# Patient Record
Sex: MALE | Race: WHITE | Employment: OTHER | URBAN - METROPOLITAN AREA
[De-identification: names, ages, dates, MRNs, and addresses within clinical notes are randomized per-mention and may not be internally consistent; named-entity substitution may affect disease eponyms.]

---

## 2017-01-09 ENCOUNTER — OFFICE VISIT (OUTPATIENT)
Dept: ORTHOPEDIC SURGERY | Age: 77
End: 2017-01-09

## 2017-01-09 VITALS
HEART RATE: 57 BPM | HEIGHT: 68 IN | DIASTOLIC BLOOD PRESSURE: 69 MMHG | BODY MASS INDEX: 33.35 KG/M2 | WEIGHT: 220.02 LBS | SYSTOLIC BLOOD PRESSURE: 108 MMHG

## 2017-01-09 DIAGNOSIS — M48.061 LUMBAR STENOSIS: Primary | ICD-10-CM

## 2017-01-09 DIAGNOSIS — M47.816 SPONDYLOSIS OF LUMBAR REGION WITHOUT MYELOPATHY OR RADICULOPATHY: ICD-10-CM

## 2017-01-09 PROCEDURE — 99213 OFFICE O/P EST LOW 20 MIN: CPT | Performed by: PHYSICAL MEDICINE & REHABILITATION

## 2017-01-13 ENCOUNTER — TELEPHONE (OUTPATIENT)
Dept: ORTHOPEDIC SURGERY | Age: 77
End: 2017-01-13

## 2017-01-24 ENCOUNTER — HOSPITAL ENCOUNTER (OUTPATIENT)
Dept: PAIN MANAGEMENT | Age: 77
Discharge: HOME | End: 2017-01-24
Attending: PHYSICAL MEDICINE & REHABILITATION | Admitting: PHYSICAL MEDICINE & REHABILITATION

## 2017-01-24 VITALS
HEIGHT: 68 IN | RESPIRATION RATE: 18 BRPM | BODY MASS INDEX: 31.37 KG/M2 | HEART RATE: 57 BPM | TEMPERATURE: 97.3 F | OXYGEN SATURATION: 95 % | SYSTOLIC BLOOD PRESSURE: 142 MMHG | WEIGHT: 207 LBS | DIASTOLIC BLOOD PRESSURE: 91 MMHG

## 2017-01-24 LAB
GLUCOSE BLD-MCNC: 105 MG/DL (ref 70–99)
PERFORMED ON: ABNORMAL

## 2017-01-24 ASSESSMENT — PAIN - FUNCTIONAL ASSESSMENT
PAIN_FUNCTIONAL_ASSESSMENT: 0-10
PAIN_FUNCTIONAL_ASSESSMENT: 0-10

## 2017-01-24 ASSESSMENT — ACTIVITIES OF DAILY LIVING (ADL): EFFECT OF PAIN ON DAILY ACTIVITIES: STANDING OR WALKING

## 2017-01-24 ASSESSMENT — PAIN DESCRIPTION - DESCRIPTORS: DESCRIPTORS: ACHING

## 2017-02-14 ENCOUNTER — OFFICE VISIT (OUTPATIENT)
Dept: ORTHOPEDIC SURGERY | Age: 77
End: 2017-02-14

## 2017-02-14 VITALS
WEIGHT: 207.01 LBS | SYSTOLIC BLOOD PRESSURE: 136 MMHG | HEIGHT: 68 IN | HEART RATE: 54 BPM | DIASTOLIC BLOOD PRESSURE: 83 MMHG | BODY MASS INDEX: 31.37 KG/M2

## 2017-02-14 DIAGNOSIS — M48.061 LUMBAR STENOSIS: Primary | ICD-10-CM

## 2017-02-14 DIAGNOSIS — I71.40 AAA (ABDOMINAL AORTIC ANEURYSM) WITHOUT RUPTURE: ICD-10-CM

## 2017-02-14 PROCEDURE — 99213 OFFICE O/P EST LOW 20 MIN: CPT | Performed by: PHYSICAL MEDICINE & REHABILITATION

## 2017-05-01 DIAGNOSIS — R73.01 IMPAIRED FASTING BLOOD SUGAR: ICD-10-CM

## 2017-05-15 RX ORDER — MELOXICAM 15 MG/1
15 TABLET ORAL DAILY
Qty: 30 TABLET | Refills: 0 | Status: SHIPPED | OUTPATIENT
Start: 2017-05-15 | End: 2017-10-09 | Stop reason: SDUPTHER

## 2017-05-15 RX ORDER — ATORVASTATIN CALCIUM 80 MG/1
TABLET, FILM COATED ORAL
Qty: 30 TABLET | Refills: 0 | Status: SHIPPED | OUTPATIENT
Start: 2017-05-15 | End: 2017-06-20 | Stop reason: SDUPTHER

## 2017-05-29 DIAGNOSIS — R73.01 IMPAIRED FASTING BLOOD SUGAR: ICD-10-CM

## 2017-06-07 ENCOUNTER — OFFICE VISIT (OUTPATIENT)
Dept: FAMILY MEDICINE CLINIC | Age: 77
End: 2017-06-07

## 2017-06-07 VITALS
WEIGHT: 215 LBS | RESPIRATION RATE: 16 BRPM | BODY MASS INDEX: 32.58 KG/M2 | HEART RATE: 53 BPM | OXYGEN SATURATION: 93 % | DIASTOLIC BLOOD PRESSURE: 70 MMHG | HEIGHT: 68 IN | SYSTOLIC BLOOD PRESSURE: 122 MMHG

## 2017-06-07 DIAGNOSIS — F32.9 REACTIVE DEPRESSION: ICD-10-CM

## 2017-06-07 DIAGNOSIS — E11.9 TYPE 2 DIABETES MELLITUS WITHOUT COMPLICATION, WITH LONG-TERM CURRENT USE OF INSULIN (HCC): Primary | ICD-10-CM

## 2017-06-07 DIAGNOSIS — E78.00 HYPERCHOLESTEROLEMIA: ICD-10-CM

## 2017-06-07 DIAGNOSIS — M54.16 LUMBAR RADICULOPATHY: ICD-10-CM

## 2017-06-07 DIAGNOSIS — I25.10 CORONARY ARTERY DISEASE INVOLVING NATIVE CORONARY ARTERY OF NATIVE HEART WITHOUT ANGINA PECTORIS: ICD-10-CM

## 2017-06-07 DIAGNOSIS — I71.40 ABDOMINAL AORTIC ANEURYSM (AAA) WITHOUT RUPTURE: ICD-10-CM

## 2017-06-07 DIAGNOSIS — H91.93 BILATERAL HEARING LOSS: ICD-10-CM

## 2017-06-07 DIAGNOSIS — Z79.4 TYPE 2 DIABETES MELLITUS WITHOUT COMPLICATION, WITH LONG-TERM CURRENT USE OF INSULIN (HCC): Primary | ICD-10-CM

## 2017-06-07 LAB — HBA1C MFR BLD: 6.5 %

## 2017-06-07 PROCEDURE — 99214 OFFICE O/P EST MOD 30 MIN: CPT | Performed by: INTERNAL MEDICINE

## 2017-06-07 PROCEDURE — 83036 HEMOGLOBIN GLYCOSYLATED A1C: CPT | Performed by: INTERNAL MEDICINE

## 2017-06-07 RX ORDER — SERTRALINE HYDROCHLORIDE 100 MG/1
100 TABLET, FILM COATED ORAL DAILY
Qty: 90 TABLET | Refills: 0 | Status: SHIPPED | OUTPATIENT
Start: 2017-06-07 | End: 2017-09-01 | Stop reason: SDUPTHER

## 2017-06-07 ASSESSMENT — PATIENT HEALTH QUESTIONNAIRE - PHQ9
1. LITTLE INTEREST OR PLEASURE IN DOING THINGS: 0
2. FEELING DOWN, DEPRESSED OR HOPELESS: 0
SUM OF ALL RESPONSES TO PHQ9 QUESTIONS 1 & 2: 0
SUM OF ALL RESPONSES TO PHQ QUESTIONS 1-9: 0

## 2017-06-07 ASSESSMENT — ENCOUNTER SYMPTOMS
COLOR CHANGE: 0
COUGH: 0

## 2017-06-14 DIAGNOSIS — E11.9 TYPE 2 DIABETES MELLITUS WITHOUT COMPLICATION, WITH LONG-TERM CURRENT USE OF INSULIN (HCC): ICD-10-CM

## 2017-06-14 DIAGNOSIS — Z79.4 TYPE 2 DIABETES MELLITUS WITHOUT COMPLICATION, WITH LONG-TERM CURRENT USE OF INSULIN (HCC): ICD-10-CM

## 2017-06-14 DIAGNOSIS — E78.00 HYPERCHOLESTEROLEMIA: ICD-10-CM

## 2017-06-14 LAB
A/G RATIO: 2 (ref 1.1–2.2)
ALBUMIN SERPL-MCNC: 4.6 G/DL (ref 3.4–5)
ALP BLD-CCNC: 69 U/L (ref 40–129)
ALT SERPL-CCNC: 17 U/L (ref 10–40)
ANION GAP SERPL CALCULATED.3IONS-SCNC: 11 MMOL/L (ref 3–16)
AST SERPL-CCNC: 20 U/L (ref 15–37)
BILIRUB SERPL-MCNC: 1.1 MG/DL (ref 0–1)
BUN BLDV-MCNC: 14 MG/DL (ref 7–20)
CALCIUM SERPL-MCNC: 9.7 MG/DL (ref 8.3–10.6)
CHLORIDE BLD-SCNC: 101 MMOL/L (ref 99–110)
CHOLESTEROL, TOTAL: 117 MG/DL (ref 0–199)
CO2: 29 MMOL/L (ref 21–32)
CREAT SERPL-MCNC: 0.8 MG/DL (ref 0.8–1.3)
GFR AFRICAN AMERICAN: >60
GFR NON-AFRICAN AMERICAN: >60
GLOBULIN: 2.3 G/DL
GLUCOSE BLD-MCNC: 103 MG/DL (ref 70–99)
HDLC SERPL-MCNC: 36 MG/DL (ref 40–60)
LDL CHOLESTEROL CALCULATED: 59 MG/DL
POTASSIUM SERPL-SCNC: 5.6 MMOL/L (ref 3.5–5.1)
SODIUM BLD-SCNC: 141 MMOL/L (ref 136–145)
TOTAL PROTEIN: 6.9 G/DL (ref 6.4–8.2)
TRIGL SERPL-MCNC: 111 MG/DL (ref 0–150)
VLDLC SERPL CALC-MCNC: 22 MG/DL

## 2017-06-23 RX ORDER — ATORVASTATIN CALCIUM 80 MG/1
TABLET, FILM COATED ORAL
Qty: 30 TABLET | Refills: 3 | Status: SHIPPED | OUTPATIENT
Start: 2017-06-23 | End: 2017-08-28 | Stop reason: SDUPTHER

## 2017-08-28 ENCOUNTER — TELEPHONE (OUTPATIENT)
Dept: FAMILY MEDICINE CLINIC | Age: 77
End: 2017-08-28

## 2017-08-28 DIAGNOSIS — R73.01 IMPAIRED FASTING BLOOD SUGAR: ICD-10-CM

## 2017-08-29 RX ORDER — ATORVASTATIN CALCIUM 80 MG/1
TABLET, FILM COATED ORAL
Qty: 90 TABLET | Refills: 1 | Status: SHIPPED | OUTPATIENT
Start: 2017-08-29 | End: 2017-10-09 | Stop reason: SDUPTHER

## 2017-09-01 DIAGNOSIS — F32.9 REACTIVE DEPRESSION: ICD-10-CM

## 2017-09-01 RX ORDER — SERTRALINE HYDROCHLORIDE 100 MG/1
TABLET, FILM COATED ORAL
Qty: 90 TABLET | Refills: 0 | Status: SHIPPED | OUTPATIENT
Start: 2017-09-01 | End: 2017-10-09 | Stop reason: SDUPTHER

## 2017-09-05 DIAGNOSIS — F32.9 REACTIVE DEPRESSION: ICD-10-CM

## 2017-09-06 RX ORDER — SERTRALINE HYDROCHLORIDE 100 MG/1
TABLET, FILM COATED ORAL
Qty: 90 TABLET | Refills: 0 | Status: SHIPPED | OUTPATIENT
Start: 2017-09-06 | End: 2017-11-08 | Stop reason: SDUPTHER

## 2017-10-09 ENCOUNTER — OFFICE VISIT (OUTPATIENT)
Dept: FAMILY MEDICINE CLINIC | Age: 77
End: 2017-10-09

## 2017-10-09 VITALS
OXYGEN SATURATION: 93 % | TEMPERATURE: 98.4 F | HEART RATE: 59 BPM | WEIGHT: 221 LBS | SYSTOLIC BLOOD PRESSURE: 108 MMHG | BODY MASS INDEX: 33.49 KG/M2 | RESPIRATION RATE: 16 BRPM | DIASTOLIC BLOOD PRESSURE: 70 MMHG | HEIGHT: 68 IN

## 2017-10-09 DIAGNOSIS — F32.9 REACTIVE DEPRESSION: ICD-10-CM

## 2017-10-09 DIAGNOSIS — Z23 NEED FOR INFLUENZA VACCINATION: ICD-10-CM

## 2017-10-09 DIAGNOSIS — Z86.79 S/P ABDOMINAL AORTIC ANEURYSM REPAIR: ICD-10-CM

## 2017-10-09 DIAGNOSIS — E78.00 PURE HYPERCHOLESTEROLEMIA: ICD-10-CM

## 2017-10-09 DIAGNOSIS — Z23 NEED FOR VACCINATION WITH 13-POLYVALENT PNEUMOCOCCAL CONJUGATE VACCINE: ICD-10-CM

## 2017-10-09 DIAGNOSIS — H91.93 BILATERAL HEARING LOSS, UNSPECIFIED HEARING LOSS TYPE: ICD-10-CM

## 2017-10-09 DIAGNOSIS — Z98.890 S/P ABDOMINAL AORTIC ANEURYSM REPAIR: ICD-10-CM

## 2017-10-09 DIAGNOSIS — E11.9 TYPE 2 DIABETES MELLITUS WITHOUT COMPLICATION, WITHOUT LONG-TERM CURRENT USE OF INSULIN (HCC): Primary | ICD-10-CM

## 2017-10-09 DIAGNOSIS — I25.10 CORONARY ARTERY DISEASE INVOLVING NATIVE CORONARY ARTERY OF NATIVE HEART WITHOUT ANGINA PECTORIS: ICD-10-CM

## 2017-10-09 DIAGNOSIS — M25.50 MULTIPLE JOINT PAIN: ICD-10-CM

## 2017-10-09 PROCEDURE — 90670 PCV13 VACCINE IM: CPT | Performed by: INTERNAL MEDICINE

## 2017-10-09 PROCEDURE — G0008 ADMIN INFLUENZA VIRUS VAC: HCPCS | Performed by: INTERNAL MEDICINE

## 2017-10-09 PROCEDURE — 90662 IIV NO PRSV INCREASED AG IM: CPT | Performed by: INTERNAL MEDICINE

## 2017-10-09 PROCEDURE — G0009 ADMIN PNEUMOCOCCAL VACCINE: HCPCS | Performed by: INTERNAL MEDICINE

## 2017-10-09 PROCEDURE — 99214 OFFICE O/P EST MOD 30 MIN: CPT | Performed by: INTERNAL MEDICINE

## 2017-10-09 ASSESSMENT — ENCOUNTER SYMPTOMS
COLOR CHANGE: 0
COUGH: 0

## 2017-10-13 RX ORDER — ATORVASTATIN CALCIUM 80 MG/1
TABLET, FILM COATED ORAL
Qty: 90 TABLET | Refills: 1 | Status: SHIPPED | OUTPATIENT
Start: 2017-10-13 | End: 2018-05-03 | Stop reason: SDUPTHER

## 2017-10-13 RX ORDER — MELOXICAM 15 MG/1
15 TABLET ORAL DAILY
Qty: 30 TABLET | Refills: 0 | Status: SHIPPED | OUTPATIENT
Start: 2017-10-13 | End: 2017-11-08

## 2017-10-13 RX ORDER — SERTRALINE HYDROCHLORIDE 100 MG/1
100 TABLET, FILM COATED ORAL DAILY
Qty: 90 TABLET | Refills: 0 | Status: SHIPPED | OUTPATIENT
Start: 2017-10-13 | End: 2018-05-29 | Stop reason: SDUPTHER

## 2017-10-15 PROBLEM — Z98.890 S/P ABDOMINAL AORTIC ANEURYSM REPAIR: Status: ACTIVE | Noted: 2017-10-15

## 2017-10-15 PROBLEM — Z86.79 S/P ABDOMINAL AORTIC ANEURYSM REPAIR: Status: ACTIVE | Noted: 2017-10-15

## 2017-10-15 NOTE — PATIENT INSTRUCTIONS
you are having a problem with your medicine. You will get more details on the specific medicines your doctor prescribes. · Check your blood sugar as often as your doctor recommends. It is important to keep track of any symptoms you have, such as low blood sugar. Also tell your doctor if you have any changes in your activities, diet, or insulin use. · Talk to your doctor before you start taking aspirin every day. Aspirin can help certain people lower their risk of a heart attack or stroke. But taking aspirin isn't right for everyone, because it can cause serious bleeding. · Do not smoke. If you need help quitting, talk to your doctor about stop-smoking programs and medicines. These can increase your chances of quitting for good. · Keep your cholesterol and blood pressure at normal levels. You may need to take one or more medicines to reach your goals. Take them exactly as directed. Do not stop or change a medicine without talking to your doctor first.  When should you call for help? Call 911 anytime you think you may need emergency care. For example, call if:  · You passed out (lost consciousness), or you suddenly become very sleepy or confused. (You may have very low blood sugar.)  Call your doctor now or seek immediate medical care if:  · Your blood sugar is 300 mg/dL or is higher than the level your doctor has set for you. · You have symptoms of low blood sugar, such as:  ¨ Sweating. ¨ Feeling nervous, shaky, and weak. ¨ Extreme hunger and slight nausea. ¨ Dizziness and headache. ¨ Blurred vision. ¨ Confusion. Watch closely for changes in your health, and be sure to contact your doctor if:  · You often have problems controlling your blood sugar. · You have symptoms of long-term diabetes problems, such as:  ¨ New vision changes. ¨ New pain, numbness, or tingling in your hands or feet. ¨ Skin problems. Where can you learn more? Go to [email or web address] and sign in to your

## 2017-11-08 ENCOUNTER — OFFICE VISIT (OUTPATIENT)
Dept: FAMILY MEDICINE CLINIC | Age: 77
End: 2017-11-08

## 2017-11-08 VITALS
HEIGHT: 68 IN | DIASTOLIC BLOOD PRESSURE: 91 MMHG | OXYGEN SATURATION: 93 % | HEART RATE: 60 BPM | RESPIRATION RATE: 16 BRPM | WEIGHT: 221 LBS | SYSTOLIC BLOOD PRESSURE: 145 MMHG | BODY MASS INDEX: 33.49 KG/M2

## 2017-11-08 DIAGNOSIS — E11.9 CONTROLLED TYPE 2 DIABETES MELLITUS WITHOUT COMPLICATION, WITHOUT LONG-TERM CURRENT USE OF INSULIN (HCC): ICD-10-CM

## 2017-11-08 DIAGNOSIS — M25.50 MULTIPLE JOINT PAIN: Primary | ICD-10-CM

## 2017-11-08 DIAGNOSIS — F32.9 REACTIVE DEPRESSION: ICD-10-CM

## 2017-11-08 DIAGNOSIS — I25.10 CORONARY ARTERY DISEASE INVOLVING NATIVE CORONARY ARTERY OF NATIVE HEART WITHOUT ANGINA PECTORIS: ICD-10-CM

## 2017-11-08 DIAGNOSIS — Z23 NEED FOR SHINGLES VACCINE: ICD-10-CM

## 2017-11-08 DIAGNOSIS — Z86.79 S/P AAA REPAIR: ICD-10-CM

## 2017-11-08 DIAGNOSIS — Z98.890 S/P AAA REPAIR: ICD-10-CM

## 2017-11-08 PROCEDURE — 83036 HEMOGLOBIN GLYCOSYLATED A1C: CPT | Performed by: INTERNAL MEDICINE

## 2017-11-08 PROCEDURE — 99213 OFFICE O/P EST LOW 20 MIN: CPT | Performed by: INTERNAL MEDICINE

## 2017-11-08 RX ORDER — MELOXICAM 15 MG/1
15 TABLET ORAL DAILY
Qty: 30 TABLET | Refills: 0 | Status: CANCELLED | OUTPATIENT
Start: 2017-11-08

## 2017-11-08 RX ORDER — SERTRALINE HYDROCHLORIDE 100 MG/1
100 TABLET, FILM COATED ORAL DAILY
Qty: 90 TABLET | Refills: 0 | Status: CANCELLED | OUTPATIENT
Start: 2017-11-08

## 2017-11-08 RX ORDER — LOSARTAN POTASSIUM AND HYDROCHLOROTHIAZIDE 12.5; 1 MG/1; MG/1
1 TABLET ORAL DAILY
Qty: 90 TABLET | Refills: 1 | Status: CANCELLED | OUTPATIENT
Start: 2017-11-08

## 2017-11-08 ASSESSMENT — ENCOUNTER SYMPTOMS
COUGH: 0
COLOR CHANGE: 0

## 2017-11-08 NOTE — PROGRESS NOTES
Subjective:      Patient ID: Ria Peterson is a 65 y.o. male. HPI   Came in concerned with being very tired  Once in a while with no energy. Worked 4 days a week 5 hours a day. Does a lot of walking at work  Just had aortic aneurysm surgery 2 n months ago. States he is feeling good now Tired when he comes home from work , is ok in the morning. Advised he might need to work 2 days a week for now until he feels like he is able to work 4 days a week 5 hours  A day with a lot of walikng. Was On meloxicam for multiple joint pain, discontinued prior to aneurysm surgery  Taking zoloft for depression  Coronary artery disease is stable. Lab Results   Component Value Date    LABA1C 6.5 06/07/2017     Lab Results   Component Value Date    CHOL 117 06/14/2017    CHOL 131 10/03/2016     Lab Results   Component Value Date    TRIG 111 06/14/2017    TRIG 140 10/03/2016     Lab Results   Component Value Date    HDL 36 (L) 06/14/2017    HDL 32 (L) 10/03/2016     Lab Results   Component Value Date    LDLCALC 59 06/14/2017    LDLCALC 71 10/03/2016     Lab Results   Component Value Date    LABVLDL 22 06/14/2017    LABVLDL 28 10/03/2016     No results found for: [facility]    Chemistry        Component Value Date/Time     06/14/2017 0925    K 5.6 (H) 06/14/2017 0925     06/14/2017 0925    CO2 29 06/14/2017 0925    BUN 14 06/14/2017 0925    CREATININE 0.8 06/14/2017 0925        Component Value Date/Time    CALCIUM 9.7 06/14/2017 0925    ALKPHOS 69 06/14/2017 0925    AST 20 06/14/2017 0925    ALT 17 06/14/2017 0925    BILITOT 1.1 (H) 06/14/2017 0925        Allergies   Allergen Reactions    Penicillins Hives     Past Medical History:   Diagnosis Date    CAD (coronary artery disease)     Diabetes ([address]     Stroke [address] [address]        Review of Systems   Constitutional: Negative for activity change. HENT: Positive for hearing loss. Respiratory: Negative for cough. Cardiovascular: Negative for chest pain.    Skin: Negative for color change, rash and wound. Neurological: Negative for dizziness and headaches. Psychiatric/Behavioral: Negative. Objective:   Physical Exam   Constitutional: He appears well-developed and well-nourished. HENT:   Head: Normocephalic. Eyes: EOM are normal. Pupils are equal, round, and reactive to light. Cardiovascular: Normal rate and regular rhythm. Pulmonary/Chest: Effort normal and breath sounds normal.   Musculoskeletal: Normal range of motion. He exhibits no edema. Skin:        Nursing note and vitals reviewed. Assessment and plan:      1. Multiple joint pain  - exercise for now    2. Reactive depression  -on zoloft    3. Coronary artery disease involving native coronary artery of native heart without angina pectoris  -stable    4. S/P AAA repair  -stable    5. Need for shingles vaccine  -script given    6. Controlled type 2 diabetes mellitus without complication, without long-term current use of insulin ([facility] [address]  -on metformin    Annachetna Miesha received counseling on the following healthy behaviors: diet, exercise, medication compliance    Patient given educational materials on diabetes mellitus    I have instructed Brandee Whiteside to complete a self tracking handout on blood sugars and instructed them to bring it with them to his next appointment. Discussed use, benefit, and side effects of prescribed medications. Barriers to medication compliance addressed. All patient questions answered. Pt voiced understanding.

## 2017-11-08 NOTE — PATIENT INSTRUCTIONS
Patient Education        Recovering From Depression: Care Instructions  Your Care Instructions  Taking good care of yourself is important as you recover from depression. In time, your symptoms will fade as your treatment takes hold. Do not give up. Instead, focus your energy on getting better. Your mood will improve. It just takes some time. Focus on things that can help you feel better, such as being with friends and family, eating well, and getting enough rest. But take things slowly. Do not do too much too soon. You will begin to feel better gradually. Follow-up care is a key part of your treatment and safety. Be sure to make and go to all appointments, and call your doctor if you are having problems. It's also a good idea to know your test results and keep a list of the medicines you take. How can you care for yourself at home? Be realistic  · If you have a large task to do, break it up into smaller steps you can handle, and just do what you can. · You may want to put off important decisions until your depression has lifted. If you have plans that will have a major impact on your life, such as marriage, divorce, or a job change, try to wait a bit. Talk it over with friends and loved ones who can help you look at the overall picture first.  · Reaching out to people for help is important. Do not isolate yourself. Let your family and friends help you. Find someone you can trust and confide in, and talk to that person. · Be patient, and be kind to yourself. Remember that depression is not your fault and is not something you can overcome with willpower alone. Treatment is necessary for depression, just like for any other illness. Feeling better takes time, and your mood will improve little by little. Stay active  · Stay busy and get outside. Take a walk, or try some other light exercise. · Talk with your doctor about an exercise program. Exercise can help with mild depression. · Go to a movie or concert. all of the medicines you take, including those with or without a prescription. · Keep the numbers for these national suicide hotlines: 4-581-886-TALK (2-980.189.7811) and 6-518-CJXGLMP (2-716.938.9403). If you or someone you know talks about suicide or feeling hopeless, get help right away. When should you call for help? Call 911 anytime you think you may need emergency care. For example, call if:  · You feel like hurting yourself or someone else. · Someone you know has depression and is about to attempt or is attempting suicide. Call your doctor now or seek immediate medical care if:  · You hear voices. · Someone you know has depression and:  ¨ Starts to give away his or her possessions. ¨ Uses illegal drugs or drinks alcohol heavily. ¨ Talks or writes about death, including writing suicide notes or talking about guns, knives, or pills. ¨ Starts to spend a lot of time alone. ¨ Acts very aggressively or suddenly appears calm. Watch closely for changes in your health, and be sure to contact your doctor if:  · You do not get better as expected. Where can you learn more? Go to [email or web address] and sign in to your E[employer] account. Enter I036 in the [employer] Data.Media box to learn more about \"Recovering From Depression: Care Instructions. \"     If you do not have an account, please click on the \"Sign Up Now\" link. Current as of: July 26, 2016  Content Version: 11.3  © 3876-0623 [employer]. Care instructions adapted under license by [facility] ([facility]). If you have questions about a medical condition or this instruction, always ask your healthcare professional. [place] [address] any warranty or liability for your use of this information.

## 2017-11-12 PROBLEM — E11.9 CONTROLLED TYPE 2 DIABETES MELLITUS WITHOUT COMPLICATION, WITHOUT LONG-TERM CURRENT USE OF INSULIN (HCC): Status: ACTIVE | Noted: 2017-11-12

## 2017-11-27 RX ORDER — LOSARTAN POTASSIUM AND HYDROCHLOROTHIAZIDE 12.5; 1 MG/1; MG/1
1 TABLET ORAL DAILY
Qty: 30 TABLET | Refills: 3 | Status: SHIPPED | OUTPATIENT
Start: 2017-11-27 | End: 2018-04-09 | Stop reason: SDUPTHER

## 2018-04-11 RX ORDER — LOSARTAN POTASSIUM AND HYDROCHLOROTHIAZIDE 12.5; 1 MG/1; MG/1
1 TABLET ORAL DAILY
Qty: 30 TABLET | Refills: 3 | Status: SHIPPED | OUTPATIENT
Start: 2018-04-11 | End: 2018-05-08 | Stop reason: SDUPTHER

## 2018-05-03 ENCOUNTER — OFFICE VISIT (OUTPATIENT)
Dept: ORTHOPEDIC SURGERY | Age: 78
End: 2018-05-03

## 2018-05-03 VITALS
DIASTOLIC BLOOD PRESSURE: 74 MMHG | SYSTOLIC BLOOD PRESSURE: 125 MMHG | HEART RATE: 54 BPM | WEIGHT: 223 LBS | HEIGHT: 68 IN | BODY MASS INDEX: 33.8 KG/M2

## 2018-05-03 DIAGNOSIS — M25.511 RIGHT SHOULDER PAIN, UNSPECIFIED CHRONICITY: ICD-10-CM

## 2018-05-03 DIAGNOSIS — M75.41 IMPINGEMENT SYNDROME OF RIGHT SHOULDER: Primary | ICD-10-CM

## 2018-05-03 DIAGNOSIS — E78.00 PURE HYPERCHOLESTEROLEMIA: ICD-10-CM

## 2018-05-03 PROCEDURE — 99213 OFFICE O/P EST LOW 20 MIN: CPT | Performed by: ORTHOPAEDIC SURGERY

## 2018-05-03 PROCEDURE — 20611 DRAIN/INJ JOINT/BURSA W/US: CPT | Performed by: ORTHOPAEDIC SURGERY

## 2018-05-03 RX ORDER — ATORVASTATIN CALCIUM 80 MG/1
TABLET, FILM COATED ORAL
Qty: 90 TABLET | Refills: 0 | Status: SHIPPED | OUTPATIENT
Start: 2018-05-03 | End: 2018-11-23 | Stop reason: SDUPTHER

## 2018-05-09 DIAGNOSIS — E11.9 TYPE 2 DIABETES MELLITUS WITHOUT COMPLICATION, WITHOUT LONG-TERM CURRENT USE OF INSULIN (HCC): ICD-10-CM

## 2018-05-09 RX ORDER — LOSARTAN POTASSIUM AND HYDROCHLOROTHIAZIDE 12.5; 1 MG/1; MG/1
1 TABLET ORAL DAILY
Qty: 30 TABLET | Refills: 0 | Status: SHIPPED
Start: 2018-05-09 | End: 2019-07-15

## 2018-05-29 DIAGNOSIS — F32.9 REACTIVE DEPRESSION: ICD-10-CM

## 2018-05-29 RX ORDER — SERTRALINE HYDROCHLORIDE 100 MG/1
100 TABLET, FILM COATED ORAL DAILY
Qty: 30 TABLET | Refills: 0 | Status: SHIPPED | OUTPATIENT
Start: 2018-05-29 | End: 2018-06-25 | Stop reason: SDUPTHER

## 2018-06-07 DIAGNOSIS — E11.9 TYPE 2 DIABETES MELLITUS WITHOUT COMPLICATION, WITHOUT LONG-TERM CURRENT USE OF INSULIN (HCC): ICD-10-CM

## 2018-06-18 ENCOUNTER — OFFICE VISIT (OUTPATIENT)
Dept: FAMILY MEDICINE CLINIC | Age: 78
End: 2018-06-18

## 2018-06-18 VITALS
SYSTOLIC BLOOD PRESSURE: 118 MMHG | HEART RATE: 60 BPM | DIASTOLIC BLOOD PRESSURE: 70 MMHG | WEIGHT: 218 LBS | OXYGEN SATURATION: 90 % | BODY MASS INDEX: 33.04 KG/M2 | HEIGHT: 68 IN

## 2018-06-18 DIAGNOSIS — I25.10 CORONARY ARTERY DISEASE INVOLVING NATIVE CORONARY ARTERY OF NATIVE HEART WITHOUT ANGINA PECTORIS: ICD-10-CM

## 2018-06-18 DIAGNOSIS — E11.9 CONTROLLED TYPE 2 DIABETES MELLITUS WITHOUT COMPLICATION, WITHOUT LONG-TERM CURRENT USE OF INSULIN (HCC): ICD-10-CM

## 2018-06-18 DIAGNOSIS — K21.9 GASTROESOPHAGEAL REFLUX DISEASE WITHOUT ESOPHAGITIS: Primary | ICD-10-CM

## 2018-06-18 DIAGNOSIS — E78.00 PURE HYPERCHOLESTEROLEMIA: ICD-10-CM

## 2018-06-18 DIAGNOSIS — H91.93 BILATERAL HEARING LOSS, UNSPECIFIED HEARING LOSS TYPE: ICD-10-CM

## 2018-06-18 DIAGNOSIS — F32.9 REACTIVE DEPRESSION: ICD-10-CM

## 2018-06-18 LAB — HBA1C MFR BLD: 6.5 %

## 2018-06-18 PROCEDURE — 99214 OFFICE O/P EST MOD 30 MIN: CPT | Performed by: INTERNAL MEDICINE

## 2018-06-18 PROCEDURE — 83036 HEMOGLOBIN GLYCOSYLATED A1C: CPT | Performed by: INTERNAL MEDICINE

## 2018-06-18 ASSESSMENT — PATIENT HEALTH QUESTIONNAIRE - PHQ9
SUM OF ALL RESPONSES TO PHQ9 QUESTIONS 1 & 2: 0
SUM OF ALL RESPONSES TO PHQ QUESTIONS 1-9: 0
2. FEELING DOWN, DEPRESSED OR HOPELESS: 0
1. LITTLE INTEREST OR PLEASURE IN DOING THINGS: 0

## 2018-06-18 ASSESSMENT — ENCOUNTER SYMPTOMS
COUGH: 0
COLOR CHANGE: 0

## 2018-08-07 DIAGNOSIS — E11.9 TYPE 2 DIABETES MELLITUS WITHOUT COMPLICATION, WITHOUT LONG-TERM CURRENT USE OF INSULIN (HCC): ICD-10-CM

## 2018-08-10 DIAGNOSIS — E11.9 CONTROLLED TYPE 2 DIABETES MELLITUS WITHOUT COMPLICATION, WITHOUT LONG-TERM CURRENT USE OF INSULIN (HCC): ICD-10-CM

## 2018-08-10 DIAGNOSIS — E78.00 PURE HYPERCHOLESTEROLEMIA: ICD-10-CM

## 2018-08-10 LAB
A/G RATIO: 1.8 (ref 1.1–2.2)
ALBUMIN SERPL-MCNC: 4.2 G/DL (ref 3.4–5)
ALP BLD-CCNC: 81 U/L (ref 40–129)
ALT SERPL-CCNC: 16 U/L (ref 10–40)
ANION GAP SERPL CALCULATED.3IONS-SCNC: 11 MMOL/L (ref 3–16)
AST SERPL-CCNC: 19 U/L (ref 15–37)
BILIRUB SERPL-MCNC: 1 MG/DL (ref 0–1)
BUN BLDV-MCNC: 11 MG/DL (ref 7–20)
CALCIUM SERPL-MCNC: 9.2 MG/DL (ref 8.3–10.6)
CHLORIDE BLD-SCNC: 105 MMOL/L (ref 99–110)
CHOLESTEROL, TOTAL: 132 MG/DL (ref 0–199)
CO2: 28 MMOL/L (ref 21–32)
CREAT SERPL-MCNC: 0.9 MG/DL (ref 0.8–1.3)
GFR AFRICAN AMERICAN: >60
GFR NON-AFRICAN AMERICAN: >60
GLOBULIN: 2.3 G/DL
GLUCOSE BLD-MCNC: 114 MG/DL (ref 70–99)
HDLC SERPL-MCNC: 32 MG/DL (ref 40–60)
LDL CHOLESTEROL CALCULATED: 69 MG/DL
POTASSIUM SERPL-SCNC: 4.5 MMOL/L (ref 3.5–5.1)
SODIUM BLD-SCNC: 144 MMOL/L (ref 136–145)
TOTAL PROTEIN: 6.5 G/DL (ref 6.4–8.2)
TRIGL SERPL-MCNC: 157 MG/DL (ref 0–150)
VLDLC SERPL CALC-MCNC: 31 MG/DL

## 2018-08-11 LAB
ESTIMATED AVERAGE GLUCOSE: 145.6 MG/DL
HBA1C MFR BLD: 6.7 %

## 2018-08-23 RX ORDER — CARVEDILOL 6.25 MG/1
6.25 TABLET ORAL 2 TIMES DAILY
Qty: 180 TABLET | Refills: 0 | Status: SHIPPED | OUTPATIENT
Start: 2018-08-23 | End: 2018-11-30 | Stop reason: SDUPTHER

## 2018-11-19 ENCOUNTER — OFFICE VISIT (OUTPATIENT)
Dept: FAMILY MEDICINE CLINIC | Age: 78
End: 2018-11-19
Payer: MEDICARE

## 2018-11-19 VITALS
HEART RATE: 65 BPM | HEIGHT: 68 IN | SYSTOLIC BLOOD PRESSURE: 122 MMHG | OXYGEN SATURATION: 90 % | WEIGHT: 213 LBS | DIASTOLIC BLOOD PRESSURE: 64 MMHG | BODY MASS INDEX: 32.28 KG/M2

## 2018-11-19 DIAGNOSIS — F32.9 REACTIVE DEPRESSION: ICD-10-CM

## 2018-11-19 DIAGNOSIS — I25.10 CORONARY ARTERY DISEASE INVOLVING NATIVE CORONARY ARTERY OF NATIVE HEART WITHOUT ANGINA PECTORIS: ICD-10-CM

## 2018-11-19 DIAGNOSIS — I10 BENIGN ESSENTIAL HTN: ICD-10-CM

## 2018-11-19 DIAGNOSIS — Z23 NEED FOR PROPHYLACTIC VACCINATION AND INOCULATION AGAINST VARICELLA: ICD-10-CM

## 2018-11-19 DIAGNOSIS — E78.00 PURE HYPERCHOLESTEROLEMIA: ICD-10-CM

## 2018-11-19 DIAGNOSIS — Z23 FLU VACCINE NEED: ICD-10-CM

## 2018-11-19 DIAGNOSIS — Z98.890 S/P AAA REPAIR: ICD-10-CM

## 2018-11-19 DIAGNOSIS — Z00.00 ROUTINE GENERAL MEDICAL EXAMINATION AT A HEALTH CARE FACILITY: Primary | ICD-10-CM

## 2018-11-19 DIAGNOSIS — Z86.79 S/P AAA REPAIR: ICD-10-CM

## 2018-11-19 DIAGNOSIS — H90.0 CONDUCTIVE HEARING LOSS, BILATERAL: ICD-10-CM

## 2018-11-19 DIAGNOSIS — K21.9 GASTROESOPHAGEAL REFLUX DISEASE WITHOUT ESOPHAGITIS: ICD-10-CM

## 2018-11-19 DIAGNOSIS — E11.9 CONTROLLED TYPE 2 DIABETES MELLITUS WITHOUT COMPLICATION, WITHOUT LONG-TERM CURRENT USE OF INSULIN (HCC): ICD-10-CM

## 2018-11-19 DIAGNOSIS — E11.9 TYPE 2 DIABETES MELLITUS WITHOUT COMPLICATION, WITHOUT LONG-TERM CURRENT USE OF INSULIN (HCC): ICD-10-CM

## 2018-11-19 LAB — HBA1C MFR BLD: 6.3 %

## 2018-11-19 PROCEDURE — G0439 PPPS, SUBSEQ VISIT: HCPCS | Performed by: INTERNAL MEDICINE

## 2018-11-19 PROCEDURE — 90662 IIV NO PRSV INCREASED AG IM: CPT | Performed by: INTERNAL MEDICINE

## 2018-11-19 PROCEDURE — 83036 HEMOGLOBIN GLYCOSYLATED A1C: CPT | Performed by: INTERNAL MEDICINE

## 2018-11-19 PROCEDURE — G0008 ADMIN INFLUENZA VIRUS VAC: HCPCS | Performed by: INTERNAL MEDICINE

## 2018-11-19 ASSESSMENT — ANXIETY QUESTIONNAIRES: GAD7 TOTAL SCORE: 0

## 2018-11-19 ASSESSMENT — PATIENT HEALTH QUESTIONNAIRE - PHQ9
SUM OF ALL RESPONSES TO PHQ QUESTIONS 1-9: 0
SUM OF ALL RESPONSES TO PHQ QUESTIONS 1-9: 0

## 2018-11-19 ASSESSMENT — LIFESTYLE VARIABLES: HOW OFTEN DO YOU HAVE A DRINK CONTAINING ALCOHOL: 0

## 2018-11-19 NOTE — PROGRESS NOTES
Vaccine Information Sheet, \"Influenza - Inactivated\"  given to Soraida Lira, or parent/legal guardian of  Soraida Lira and verbalized understanding. Patient responses:    Have you ever had a reaction to a flu vaccine? No  Are you able to eat eggs without adverse effects? Yes  Do you have any current illness? No  Have you ever had Guillian [place] Syndrome? No    Flu vaccine given per order. Please see immunization tab.

## 2018-11-19 NOTE — PATIENT INSTRUCTIONS
your own, your heart stops, or you're unable to swallow. · What things would you still want to be able to do after you receive life-support methods? Would you want to be able to walk? To speak? To eat on your own? To live without the help of machines? · If you have a choice, where do you want to be cared for? In your home? At a hospital or nursing home? · Do you want certain Gnosticism practices performed if you become very ill? · If you have a choice at the end of your life, where would you prefer to die? At home? In a hospital or nursing home? Somewhere else? · Would you prefer to be buried or cremated? · Do you want your organs to be donated after you die? What should you do with your living will? · Make sure that your family members and your health care agent have copies of your living will. · Give your doctor a copy of your living will to keep in your medical record. If you have more than one doctor, make sure that each one has a copy. · You may want to put a copy of your living will where it can be easily found. Where can you learn more? Go to [email or web address] and sign in to your [place] account. Enter M267 in the [employer] box to learn more about \"Learning About Living Jayden Wagner. \"     If you do not have an account, please click on the \"Sign Up Now\" link. Current as of: April 19, 2018  Content Version: 11.8  © 8536-1666 [employer]. Care instructions adapted under license by [facility] ([facility]). If you have questions about a medical condition or this instruction, always ask your healthcare professional. [place] [address] any warranty or liability for your use of this information. Eating Healthy Foods: Care Instructions  Your Care Instructions    Eating healthy foods can help lower your risk for disease. Healthy food gives you energy and keeps your heart strong, your brain active, your muscles working, and your bones strong.   A

## 2018-11-21 DIAGNOSIS — F32.9 REACTIVE DEPRESSION: ICD-10-CM

## 2018-11-21 DIAGNOSIS — E11.9 TYPE 2 DIABETES MELLITUS WITHOUT COMPLICATION, WITHOUT LONG-TERM CURRENT USE OF INSULIN (HCC): ICD-10-CM

## 2018-11-21 RX ORDER — SERTRALINE HYDROCHLORIDE 100 MG/1
TABLET, FILM COATED ORAL
Qty: 30 TABLET | Refills: 3 | Status: SHIPPED | OUTPATIENT
Start: 2018-11-21 | End: 2019-04-25 | Stop reason: SDUPTHER

## 2018-11-23 PROBLEM — F32.9 REACTIVE DEPRESSION: Status: ACTIVE | Noted: 2018-11-23

## 2018-11-23 PROBLEM — K21.9 GASTROESOPHAGEAL REFLUX DISEASE WITHOUT ESOPHAGITIS: Status: ACTIVE | Noted: 2018-11-23

## 2018-11-23 PROBLEM — E78.00 PURE HYPERCHOLESTEROLEMIA: Status: ACTIVE | Noted: 2018-11-23

## 2018-11-30 RX ORDER — CARVEDILOL 6.25 MG/1
TABLET ORAL
Qty: 180 TABLET | Refills: 0 | Status: SHIPPED | OUTPATIENT
Start: 2018-11-30 | End: 2019-03-03 | Stop reason: SDUPTHER

## 2019-03-05 RX ORDER — CARVEDILOL 6.25 MG/1
TABLET ORAL
Qty: 180 TABLET | Refills: 0 | Status: SHIPPED | OUTPATIENT
Start: 2019-03-05 | End: 2019-06-01 | Stop reason: SDUPTHER

## 2019-06-04 RX ORDER — CARVEDILOL 6.25 MG/1
TABLET ORAL
Qty: 180 TABLET | Refills: 0 | Status: SHIPPED | OUTPATIENT
Start: 2019-06-04 | End: 2020-01-27 | Stop reason: SDUPTHER

## 2019-07-15 RX ORDER — LOSARTAN POTASSIUM AND HYDROCHLOROTHIAZIDE 12.5; 1 MG/1; MG/1
1 TABLET ORAL DAILY
Qty: 90 TABLET | Refills: 0 | Status: SHIPPED | OUTPATIENT
Start: 2019-07-15 | End: 2019-10-13 | Stop reason: SDUPTHER

## 2019-07-24 DIAGNOSIS — E78.00 PURE HYPERCHOLESTEROLEMIA: ICD-10-CM

## 2019-07-26 RX ORDER — ATORVASTATIN CALCIUM 80 MG/1
80 TABLET, FILM COATED ORAL DAILY
Qty: 90 TABLET | Refills: 1 | Status: SHIPPED | OUTPATIENT
Start: 2019-07-26 | End: 2021-11-07 | Stop reason: SDUPTHER

## 2019-07-29 DIAGNOSIS — F32.9 REACTIVE DEPRESSION: ICD-10-CM

## 2019-08-01 RX ORDER — SERTRALINE HYDROCHLORIDE 100 MG/1
100 TABLET, FILM COATED ORAL DAILY
Qty: 30 TABLET | Refills: 0 | Status: SHIPPED | OUTPATIENT
Start: 2019-08-01 | End: 2019-08-27 | Stop reason: SDUPTHER

## 2019-08-27 DIAGNOSIS — F32.9 REACTIVE DEPRESSION: ICD-10-CM

## 2019-08-27 RX ORDER — SERTRALINE HYDROCHLORIDE 100 MG/1
TABLET, FILM COATED ORAL
Qty: 30 TABLET | Refills: 0 | Status: SHIPPED | OUTPATIENT
Start: 2019-08-27 | End: 2019-09-26 | Stop reason: SDUPTHER

## 2019-09-10 ENCOUNTER — OFFICE VISIT (OUTPATIENT)
Dept: FAMILY MEDICINE CLINIC | Age: 79
End: 2019-09-10
Payer: MEDICARE

## 2019-09-10 VITALS
DIASTOLIC BLOOD PRESSURE: 73 MMHG | SYSTOLIC BLOOD PRESSURE: 111 MMHG | HEART RATE: 55 BPM | TEMPERATURE: 97.6 F | RESPIRATION RATE: 16 BRPM | WEIGHT: 218 LBS | BODY MASS INDEX: 33.04 KG/M2 | HEIGHT: 68 IN | OXYGEN SATURATION: 93 %

## 2019-09-10 DIAGNOSIS — G89.29 CHRONIC PAIN OF LEFT KNEE: ICD-10-CM

## 2019-09-10 DIAGNOSIS — E11.9 TYPE 2 DIABETES MELLITUS WITHOUT COMPLICATION, WITHOUT LONG-TERM CURRENT USE OF INSULIN (HCC): Primary | ICD-10-CM

## 2019-09-10 DIAGNOSIS — Z91.81 AT HIGH RISK FOR FALLS: ICD-10-CM

## 2019-09-10 DIAGNOSIS — Z23 NEED FOR TDAP VACCINATION: ICD-10-CM

## 2019-09-10 DIAGNOSIS — Z12.5 PROSTATE CANCER SCREENING: ICD-10-CM

## 2019-09-10 DIAGNOSIS — M25.562 CHRONIC PAIN OF LEFT KNEE: ICD-10-CM

## 2019-09-10 DIAGNOSIS — I10 BENIGN ESSENTIAL HTN: ICD-10-CM

## 2019-09-10 LAB — HBA1C MFR BLD: 5.9 %

## 2019-09-10 PROCEDURE — 83036 HEMOGLOBIN GLYCOSYLATED A1C: CPT | Performed by: FAMILY MEDICINE

## 2019-09-10 PROCEDURE — 90471 IMMUNIZATION ADMIN: CPT | Performed by: FAMILY MEDICINE

## 2019-09-10 PROCEDURE — 90715 TDAP VACCINE 7 YRS/> IM: CPT | Performed by: FAMILY MEDICINE

## 2019-09-10 PROCEDURE — 99214 OFFICE O/P EST MOD 30 MIN: CPT | Performed by: FAMILY MEDICINE

## 2019-09-10 RX ORDER — LIDOCAINE AND PRILOCAINE 25; 25 MG/G; MG/G
CREAM TOPICAL
Qty: 1 TUBE | Refills: 1 | Status: SHIPPED | OUTPATIENT
Start: 2019-09-10 | End: 2021-01-19

## 2019-09-10 ASSESSMENT — PATIENT HEALTH QUESTIONNAIRE - PHQ9
1. LITTLE INTEREST OR PLEASURE IN DOING THINGS: 0
SUM OF ALL RESPONSES TO PHQ9 QUESTIONS 1 & 2: 0
SUM OF ALL RESPONSES TO PHQ QUESTIONS 1-9: 0
SUM OF ALL RESPONSES TO PHQ QUESTIONS 1-9: 0
2. FEELING DOWN, DEPRESSED OR HOPELESS: 0

## 2019-09-10 NOTE — PROGRESS NOTES
Relationship status: Not on file    Intimate partner violence:     Fear of current or ex partner: Not on file     Emotionally abused: Not on file     Physically abused: Not on file     Forced sexual activity: Not on file   Other Topics Concern    Not on file   Social History Narrative    Not on file       Family History   Problem Relation Age of Onset    Cancer Father     Heart Disease Father     Diabetes Father        Current Outpatient Medications   Medication Sig Dispense Refill    lidocaine-prilocaine (EMLA) 2.5-2.5 % cream Apply topically as needed. 1 Tube 1    sertraline (ZOLOFT) 100 MG tablet TAKE ONE TABLET BY MOUTH DAILY 30 tablet 0    atorvastatin (LIPITOR) 80 MG tablet Take 1 tablet by mouth daily 90 tablet 1    losartan-hydrochlorothiazide (HYZAAR) 100-12.5 MG per tablet Take 1 tablet by mouth daily 90 tablet 0    carvedilol (COREG) 6.25 MG tablet TAKE ONE TABLET BY MOUTH TWICE A  tablet 0    metFORMIN (GLUCOPHAGE) 500 MG tablet TAKE ONE TABLET BY MOUTH TWICE A DAY WITH MEALS 60 tablet 3    aspirin 81 MG tablet Take by mouth      omeprazole (PRILOSEC) 20 MG capsule Take 20 mg by mouth daily       No current facility-administered medications for this visit. Immunization History   Administered Date(s) Administered    Influenza Vaccine, unspecified formulation 09/07/2016    Influenza, High Dose (Fluzone 65 yrs and older) 09/07/2016, 10/09/2017, 11/19/2018    Pneumococcal Conjugate 13-valent ([id number]) 10/09/2017    Pneumococcal Polysaccharide ([id number]) 09/29/2016    Tdap (Boostrix, Adacel) 09/10/2019       Allergies   Allergen Reactions    Penicillins Hives       Office Visit on 11/19/2018   Component Date Value Ref Range Status    Hemoglobin A1C 11/19/2018 6.3  % Final       Review of Systems   Constitutional: Negative for fatigue. Cardiovascular: Negative for chest pain. Endocrine: Negative for polydipsia, polyphagia and polyuria.    Musculoskeletal: Positive for (LIPITOR) 80 MG tablet Take 1 tablet by mouth daily Yes Augustus Nazario MD   losartan-hydrochlorothiazide (HYZAAR) 100-12.5 MG per tablet Take 1 tablet by mouth daily Yes CARLENE Lagunas - CNP   carvedilol (COREG) 6.25 MG tablet TAKE ONE TABLET BY MOUTH TWICE A DAY Yes Augustus Nazario MD   metFORMIN (GLUCOPHAGE) 500 MG tablet TAKE ONE TABLET BY MOUTH TWICE A DAY WITH MEALS Yes Augustus Nazario MD   aspirin 81 MG tablet Take by mouth Yes Historical Provider, MD   omeprazole (PRILOSEC) 20 MG capsule Take 20 mg by mouth daily Yes Historical Provider, MD             On the basis of positive falls risk screening, assessment and plan is as follows: home safety tips provided.

## 2019-09-30 ENCOUNTER — OFFICE VISIT (OUTPATIENT)
Dept: ORTHOPEDIC SURGERY | Age: 79
End: 2019-09-30
Payer: MEDICARE

## 2019-09-30 VITALS — BODY MASS INDEX: 33.15 KG/M2 | WEIGHT: 218 LBS

## 2019-09-30 DIAGNOSIS — S80.02XA CONTUSION OF LEFT KNEE, INITIAL ENCOUNTER: ICD-10-CM

## 2019-09-30 DIAGNOSIS — M25.562 LEFT KNEE PAIN, UNSPECIFIED CHRONICITY: Primary | ICD-10-CM

## 2019-09-30 PROCEDURE — 99213 OFFICE O/P EST LOW 20 MIN: CPT | Performed by: ORTHOPAEDIC SURGERY

## 2019-09-30 RX ORDER — BETAMETHASONE SODIUM PHOSPHATE AND BETAMETHASONE ACETATE 3; 3 MG/ML; MG/ML
12 INJECTION, SUSPENSION INTRA-ARTICULAR; INTRALESIONAL; INTRAMUSCULAR; SOFT TISSUE ONCE
Status: COMPLETED | OUTPATIENT
Start: 2019-09-30 | End: 2019-09-30

## 2019-09-30 RX ADMIN — BETAMETHASONE SODIUM PHOSPHATE AND BETAMETHASONE ACETATE 12 MG: 3; 3 INJECTION, SUSPENSION INTRA-ARTICULAR; INTRALESIONAL; INTRAMUSCULAR; SOFT TISSUE at 15:17

## 2019-09-30 NOTE — PROGRESS NOTES
Administrations This Visit     betamethasone acetate-betamethasone sodium phosphate (CELESTONE) injection 12 mg     Admin Date  09/30/2019  15:17 Action  Given Dose  12 mg Route  Intra-articular Site  Knee Left Administered By  Alexander Zhao MD    Ordering Provider:  Alexander Zhao MD    NDC:  4031-2772-42    Lot#:  [id number]    :  [employer]    Patient Supplied?:  No             3ML . 5% BUPIVACAINE [facility]#[id number] PVP#[id number] EXP 6/21
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
## 2019-10-14 RX ORDER — LOSARTAN POTASSIUM AND HYDROCHLOROTHIAZIDE 12.5; 1 MG/1; MG/1
TABLET ORAL
Qty: 90 TABLET | Refills: 0 | Status: SHIPPED | OUTPATIENT
Start: 2019-10-14 | End: 2020-01-13

## 2020-01-13 RX ORDER — LOSARTAN POTASSIUM AND HYDROCHLOROTHIAZIDE 12.5; 1 MG/1; MG/1
TABLET ORAL
Qty: 90 TABLET | Refills: 0 | Status: SHIPPED | OUTPATIENT
Start: 2020-01-13 | End: 2020-07-27

## 2020-01-27 NOTE — TELEPHONE ENCOUNTER
Last OV 11/19/18  Future Appointments   Date Time Provider Shon Berrios   2/13/2020 11:00 AM DO TEVIN Hernandez  MMA

## 2020-01-28 RX ORDER — CARVEDILOL 6.25 MG/1
6.25 TABLET ORAL 2 TIMES DAILY WITH MEALS
Qty: 180 TABLET | Refills: 0 | Status: SHIPPED | OUTPATIENT
Start: 2020-01-28 | End: 2020-04-24

## 2020-02-06 DIAGNOSIS — E11.9 TYPE 2 DIABETES MELLITUS WITHOUT COMPLICATION, WITHOUT LONG-TERM CURRENT USE OF INSULIN (HCC): ICD-10-CM

## 2020-02-06 DIAGNOSIS — I10 BENIGN ESSENTIAL HTN: ICD-10-CM

## 2020-02-06 DIAGNOSIS — Z12.5 PROSTATE CANCER SCREENING: ICD-10-CM

## 2020-02-06 LAB
A/G RATIO: 1.6 (ref 1.1–2.2)
ALBUMIN SERPL-MCNC: 4.2 G/DL (ref 3.4–5)
ALP BLD-CCNC: 69 U/L (ref 40–129)
ALT SERPL-CCNC: 13 U/L (ref 10–40)
ANION GAP SERPL CALCULATED.3IONS-SCNC: 11 MMOL/L (ref 3–16)
AST SERPL-CCNC: 15 U/L (ref 15–37)
BILIRUB SERPL-MCNC: 0.9 MG/DL (ref 0–1)
BUN BLDV-MCNC: 16 MG/DL (ref 7–20)
CALCIUM SERPL-MCNC: 9.8 MG/DL (ref 8.3–10.6)
CHLORIDE BLD-SCNC: 98 MMOL/L (ref 99–110)
CHOLESTEROL, TOTAL: 170 MG/DL (ref 0–199)
CO2: 27 MMOL/L (ref 21–32)
CREAT SERPL-MCNC: 0.9 MG/DL (ref 0.8–1.3)
GFR AFRICAN AMERICAN: >60
GFR NON-AFRICAN AMERICAN: >60
GLOBULIN: 2.6 G/DL
GLUCOSE BLD-MCNC: 120 MG/DL (ref 70–99)
HDLC SERPL-MCNC: 31 MG/DL (ref 40–60)
LDL CHOLESTEROL CALCULATED: 104 MG/DL
POTASSIUM SERPL-SCNC: 4.5 MMOL/L (ref 3.5–5.1)
PROSTATE SPECIFIC ANTIGEN: 0.09 NG/ML (ref 0–4)
SODIUM BLD-SCNC: 136 MMOL/L (ref 136–145)
TOTAL PROTEIN: 6.8 G/DL (ref 6.4–8.2)
TRIGL SERPL-MCNC: 174 MG/DL (ref 0–150)
TSH SERPL DL<=0.05 MIU/L-ACNC: 6.03 UIU/ML (ref 0.27–4.2)
VLDLC SERPL CALC-MCNC: 35 MG/DL

## 2020-02-07 LAB
BASOPHILS ABSOLUTE: 0 K/UL (ref 0–0.2)
BASOPHILS RELATIVE PERCENT: 0.7 %
EOSINOPHILS ABSOLUTE: 0.3 K/UL (ref 0–0.6)
EOSINOPHILS RELATIVE PERCENT: 4.8 %
HCT VFR BLD CALC: 48.9 % (ref 40.5–52.5)
HEMOGLOBIN: 15.8 G/DL (ref 13.5–17.5)
LYMPHOCYTES ABSOLUTE: 1.7 K/UL (ref 1–5.1)
LYMPHOCYTES RELATIVE PERCENT: 28.4 %
MCH RBC QN AUTO: 28.3 PG (ref 26–34)
MCHC RBC AUTO-ENTMCNC: 32.3 G/DL (ref 31–36)
MCV RBC AUTO: 87.4 FL (ref 80–100)
MONOCYTES ABSOLUTE: 0.4 K/UL (ref 0–1.3)
MONOCYTES RELATIVE PERCENT: 7.4 %
NEUTROPHILS ABSOLUTE: 3.5 K/UL (ref 1.7–7.7)
NEUTROPHILS RELATIVE PERCENT: 58.7 %
PDW BLD-RTO: 15.6 % (ref 12.4–15.4)
PLATELET # BLD: 164 K/UL (ref 135–450)
PLATELET SLIDE REVIEW: ADEQUATE
PMV BLD AUTO: 11.4 FL (ref 5–10.5)
RBC # BLD: 5.6 M/UL (ref 4.2–5.9)
WBC # BLD: 6 K/UL (ref 4–11)

## 2020-02-13 ENCOUNTER — OFFICE VISIT (OUTPATIENT)
Dept: FAMILY MEDICINE CLINIC | Age: 80
End: 2020-02-13
Payer: MEDICARE

## 2020-02-13 VITALS
SYSTOLIC BLOOD PRESSURE: 145 MMHG | WEIGHT: 219 LBS | RESPIRATION RATE: 16 BRPM | DIASTOLIC BLOOD PRESSURE: 83 MMHG | HEART RATE: 58 BPM | TEMPERATURE: 96.8 F | OXYGEN SATURATION: 94 % | BODY MASS INDEX: 33.19 KG/M2 | HEIGHT: 68 IN

## 2020-02-13 PROCEDURE — G0439 PPPS, SUBSEQ VISIT: HCPCS | Performed by: FAMILY MEDICINE

## 2020-02-13 RX ORDER — SERTRALINE HYDROCHLORIDE 25 MG/1
25 TABLET, FILM COATED ORAL DAILY
Qty: 30 TABLET | Refills: 3 | Status: SHIPPED
Start: 2020-02-13 | End: 2020-05-13 | Stop reason: RX

## 2020-02-13 ASSESSMENT — PATIENT HEALTH QUESTIONNAIRE - PHQ9
SUM OF ALL RESPONSES TO PHQ QUESTIONS 1-9: 2
SUM OF ALL RESPONSES TO PHQ QUESTIONS 1-9: 2

## 2020-02-13 ASSESSMENT — LIFESTYLE VARIABLES: HOW OFTEN DO YOU HAVE A DRINK CONTAINING ALCOHOL: 0

## 2020-02-13 NOTE — PATIENT INSTRUCTIONS
Personalized Preventive Plan for Barbie Green - 2/13/2020  Medicare offers a range of preventive health benefits. Some of the tests and screenings are paid in full while other may be subject to a deductible, co-insurance, and/or copay. Some of these benefits include a comprehensive review of your medical history including lifestyle, illnesses that may run in your family, and various assessments and screenings as appropriate. After reviewing your medical record and screening and assessments performed today your provider may have ordered immunizations, labs, imaging, and/or referrals for you. A list of these orders (if applicable) as well as your Preventive Care list are included within your After Visit Summary for your review. Other Preventive Recommendations:    · A preventive eye exam performed by an eye specialist is recommended every 1-2 years to screen for glaucoma; cataracts, macular degeneration, and other eye disorders. · A preventive dental visit is recommended every 6 months. · Try to get at least 150 minutes of exercise per week or 10,000 steps per day on a pedometer . · Order or download the FREE \"Exercise & Physical Activity: Your Everyday Guide\" from [employer]. Call 9-545.683.1788 or search [employer] online. · You need 6221-9277 mg of calcium and 0272-8444 IU of vitamin D per day. It is possible to meet your calcium requirement with diet alone, but a vitamin D supplement is usually necessary to meet this goal.  · When exposed to the sun, use a sunscreen that protects against both UVA and UVB radiation with an SPF of 30 or greater. Reapply every 2 to 3 hours or after sweating, drying off with a towel, or swimming. · Always wear a seat belt when traveling in a car. Always wear a helmet when riding a bicycle or motorcycle.

## 2020-02-13 NOTE — PROGRESS NOTES
Medicare Annual Wellness Visit  Name: Yunior Trinidad Date: 2020   MRN: <[id number]> Sex: Male   Age: [de-identified] y.o. Ethnicity: Non-/Non    : 1940 Race: Kaden Godinez is here for Medicare AWV    Screenings for behavioral, psychosocial and functional/safety risks, and cognitive dysfunction are all negative except as indicated below. These results, as well as other patient data from the [address] form, are documented in Flowsheets linked to this Encounter. Allergies   Allergen Reactions    Penicillins Hives         Prior to Visit Medications    Medication Sig Taking? Authorizing Provider   sertraline (ZOLOFT) 25 MG tablet Take 1 tablet by mouth daily Yes Cheryl Lacrosse, DO   carvedilol (COREG) 6.25 MG tablet Take 1 tablet by mouth 2 times daily (with meals) Yes Cheryl Lacrosse, DO   losartan-hydrochlorothiazide (HYZAAR) 100-12.5 MG per tablet TAKE ONE TABLET BY MOUTH DAILY Yes Cheryl Lacrosse, DO   sertraline (ZOLOFT) 100 MG tablet TAKE ONE TABLET BY MOUTH DAILY Yes Cheryl Lacrosse, DO   lidocaine-prilocaine (EMLA) 2.5-2.5 % cream Apply topically as needed.  Yes Cheryl Lacrosse, DO   atorvastatin (LIPITOR) 80 MG tablet Take 1 tablet by mouth daily Yes Mejia Pink MD   metFORMIN (GLUCOPHAGE) 500 MG tablet TAKE ONE TABLET BY MOUTH TWICE A DAY WITH MEALS Yes Mejia Pink MD   aspirin 81 MG tablet Take by mouth Yes Historical Provider, MD   omeprazole (PRILOSEC) 20 MG capsule Take 20 mg by mouth daily Yes Historical Provider, MD         Past Medical History:   Diagnosis Date    CAD (coronary artery disease)     Diabetes ([address]     Stroke [facility])        Past Surgical History:   Procedure Laterality Date    CARDIAC SURGERY  09/10/2013    3 stents         Family History   Problem Relation Age of Onset    Cancer Father     Heart Disease Father     Diabetes Father        CareTeam (Including outside providers/suppliers regularly involved in providing 13-valent (Ifdolps13) 10/09/2017    Pneumococcal Polysaccharide ([id number]) 09/29/2016    Tdap (Boostrix, Adacel) 09/10/2019        Health Maintenance   Topic Date Due    Hepatitis B vaccine (1 of 3 - Risk 3-dose series) 09[birth year]04/1959    Shingles Vaccine (1 of 2) 09/04/1989    Annual Wellness Visit (AWV)  06/23/2019    Flu vaccine (1) 09/01/2019    Lipid screen  02/06/2021    Potassium monitoring  02/06/2021    Creatinine monitoring  02/06/2021    DTaP/Tdap/Td vaccine (2 - Td) 09/10/2029    Pneumococcal 65+ years Vaccine  Completed    Hepatitis A vaccine  Aged Out    Hib vaccine  Aged Out    Meningococcal (ACWY) vaccine  Aged Out     Recommendations for [employer] Due: see orders and patient instructions/AVS.  . Recommended screening schedule for the next 5-10 years is provided to the patient in written form: see Patient Instructions/AVS.    Enid Laboyisabel was seen today for medicare awv. Diagnoses and all orders for this visit:    Routine general medical examination at a health care facility  -     Hilary Muro MD, Ophthalmology, [facility]    Type 2 diabetes mellitus without complication, without long-term current use of insulin (HCC)  -     T4, Free; Future  -     T3, Free; Future    TSH elevation  -     T4, Free; Future  -     T3, Free; Future    Reactive depression  -     sertraline (ZOLOFT) 25 MG tablet;  Take 1 tablet by mouth daily

## 2020-02-24 NOTE — TELEPHONE ENCOUNTER
Future Appointments   Date Time Provider Shon Berrios   3/12/2020  1:00 PM DO TEVIN Bar  MMA   Last ov 2/13/20

## 2020-03-03 RX ORDER — SERTRALINE HYDROCHLORIDE 100 MG/1
TABLET, FILM COATED ORAL
Qty: 30 TABLET | Refills: 3 | Status: SHIPPED
Start: 2020-03-03 | End: 2020-05-13 | Stop reason: RX

## 2020-03-12 ENCOUNTER — OFFICE VISIT (OUTPATIENT)
Dept: FAMILY MEDICINE CLINIC | Age: 80
End: 2020-03-12
Payer: MEDICARE

## 2020-03-12 VITALS
WEIGHT: 220 LBS | BODY MASS INDEX: 33.45 KG/M2 | RESPIRATION RATE: 16 BRPM | HEART RATE: 57 BPM | DIASTOLIC BLOOD PRESSURE: 70 MMHG | SYSTOLIC BLOOD PRESSURE: 121 MMHG | TEMPERATURE: 97.7 F | OXYGEN SATURATION: 95 %

## 2020-03-12 PROCEDURE — 99213 OFFICE O/P EST LOW 20 MIN: CPT | Performed by: FAMILY MEDICINE

## 2020-03-12 NOTE — PROGRESS NOTES
3/12/2020    This is a [de-identified] y.o. male   Chief Complaint   Patient presents with    Depression   . HPI  Patient presents today for follow-up for depressed mood. Currently is taking 125 mg daily of Zoloft. At his last appointment on 2020 his Zoloft was increased from 100 to 125 mg. States today that he has noticed an improvement in his depressed mood and anxiety. States that his wife back pain which is constant and it concerns his health.  Pt also admits to incresaed energy and sleeping better   Past Medical History:   Diagnosis Date    CAD (coronary artery disease)     Diabetes ([facility] [address]     Stroke [facility])        Past Surgical History:   Procedure Laterality Date    CARDIAC SURGERY  09/10/2013    3 stents       Social History     Socioeconomic History    Marital status:      Spouse name: Not on file    Number of children: Not on file    Years of education: Not on file    Highest education level: Not on file   Occupational History    Not on file   Social Needs    Financial resource strain: Not on file    Food insecurity     Worry: Not on file     Inability: Not on file    Transportation needs     Medical: Not on file     Non-medical: Not on file   Tobacco Use    Smoking status: Former Smoker     Packs/day: 1.00     Years: 20.00     Pack years: 20.00     Types: Cigarettes     Last attempt to quit: 2002     Years since quittin.2    Smokeless tobacco: Never Used   Substance and Sexual Activity    Alcohol use: No    Drug use: No    Sexual activity: Never   Lifestyle    Physical activity     Days per week: Not on file     Minutes per session: Not on file    Stress: Not on file   Relationships    Social connections     Talks on phone: Not on file     Gets together: Not on file     Attends Quaker service: Not on file     Active member of club or organization: Not on file     Attends meetings of clubs or organizations: Not on file     Relationship status: Not on file   Steve Medina Intimate partner violence     Fear of current or ex partner: Not on file     Emotionally abused: Not on file     Physically abused: Not on file     Forced sexual activity: Not on file   Other Topics Concern    Not on file   Social History Narrative    Not on file       Family History   Problem Relation Age of Onset    Cancer Father     Heart Disease Father     Diabetes Father        Current Outpatient Medications   Medication Sig Dispense Refill    sertraline (ZOLOFT) 100 MG tablet TAKE ONE TABLET BY MOUTH DAILY 30 tablet 3    sertraline (ZOLOFT) 25 MG tablet Take 1 tablet by mouth daily 30 tablet 3    carvedilol (COREG) 6.25 MG tablet Take 1 tablet by mouth 2 times daily (with meals) 180 tablet 0    losartan-hydrochlorothiazide (HYZAAR) 100-12.5 MG per tablet TAKE ONE TABLET BY MOUTH DAILY 90 tablet 0    lidocaine-prilocaine (EMLA) 2.5-2.5 % cream Apply topically as needed. 1 Tube 1    atorvastatin (LIPITOR) 80 MG tablet Take 1 tablet by mouth daily 90 tablet 1    metFORMIN (GLUCOPHAGE) 500 MG tablet TAKE ONE TABLET BY MOUTH TWICE A DAY WITH MEALS 60 tablet 3    aspirin 81 MG tablet Take by mouth      omeprazole (PRILOSEC) 20 MG capsule Take 20 mg by mouth daily       No current facility-administered medications for this visit.         Immunization History   Administered Date(s) Administered    Influenza Vaccine, unspecified formulation 09/07/2016    Influenza, High Dose (Fluzone 65 yrs and older) 09/07/2016, 10/09/2017, 11/19/2018    Pneumococcal Conjugate 13-valent (Curacor38) 10/09/2017    Pneumococcal Polysaccharide ([id number]) 09/29/2016    Tdap (Boostrix, Adacel) 09/10/2019       Allergies   Allergen Reactions    Penicillins Hives       Orders Only on 02/06/2020   Component Date Value Ref Range Status    PSA 02/06/2020 0.09  0.00 - 4.00 ng/mL Final    WBC 02/06/2020 6.0  4.0 - 11.0 K/uL Final    RBC 02/06/2020 5.60  4.20 - 5.90 M/uL Final    Hemoglobin 02/06/2020 15.8  13.5 - 17.5 g/dL Final    Hematocrit 02/06/2020 48.9  40.5 - 52.5 % Final    MCV 02/06/2020 87.4  80.0 - 100.0 fL Final    MCH 02/06/2020 28.3  26.0 - 34.0 pg Final    MCHC 02/06/2020 32.3  31.0 - 36.0 g/dL Final    RDW 02/06/2020 15.6* 12.4 - 15.4 % Final    Platelets 77/11/5843 164  135 - 450 K/uL Final    Giant platelets noted on smear, results may be falsely decreased.  MPV 02/06/2020 11.4* 5.0 - 10.5 fL Final    PLATELET SLIDE REVIEW 02/06/2020 Adequate   Final    Neutrophils % 02/06/2020 58.7  % Final    Lymphocytes % 02/06/2020 28.4  % Final    Monocytes % 02/06/2020 7.4  % Final    Eosinophils % 02/06/2020 4.8  % Final    Basophils % 02/06/2020 0.7  % Final    Neutrophils Absolute 02/06/2020 3.5  1.7 - 7.7 K/uL Final    Lymphocytes Absolute 02/06/2020 1.7  1.0 - 5.1 K/uL Final    Monocytes Absolute 02/06/2020 0.4  0.0 - 1.3 K/uL Final    Eosinophils Absolute 02/06/2020 0.3  0.0 - 0.6 K/uL Final    Basophils Absolute 02/06/2020 0.0  0.0 - 0.2 K/uL Final    Sodium 02/06/2020 136  136 - 145 mmol/L Final    Potassium 02/06/2020 4.5  3.5 - 5.1 mmol/L Final    Chloride 02/06/2020 98* 99 - 110 mmol/L Final    CO2 02/06/2020 27  21 - 32 mmol/L Final    Anion Gap 02/06/2020 11  3 - 16 Final    Glucose 02/06/2020 120* 70 - 99 mg/dL Final    BUN 02/06/2020 16  7 - 20 mg/dL Final    CREATININE 02/06/2020 0.9  0.8 - 1.3 mg/dL Final    GFR Non- 02/06/2020 >60  >60 Final    Comment: >60 mL/min/1.73m2 EGFR, calc. for ages 25 and older using the  MDRD formula (not corrected for weight), is valid for stable  renal function.  GFR  02/06/2020 >60  >60 Final    Comment: Chronic Kidney Disease: less than 60 ml/min/1.73 sq.m. Kidney Failure: less than 15 ml/min/1.73 sq.m. Results valid for patients 18 years and older.       Calcium 02/06/2020 9.8  8.3 - 10.6 mg/dL Final    Total Protein 02/06/2020 6.8  6.4 - 8.2 g/dL Final    Alb 02/06/2020 4.2  3.4 - 5.0 g/dL Final 6/12/2020) for Depression F/U. Prior to Visit Medications    Medication Sig Taking? Authorizing Provider   sertraline (ZOLOFT) 100 MG tablet TAKE ONE TABLET BY MOUTH DAILY Yes Saavedra Sheets, DO   sertraline (ZOLOFT) 25 MG tablet Take 1 tablet by mouth daily Yes Saavedra Sheets, DO   carvedilol (COREG) 6.25 MG tablet Take 1 tablet by mouth 2 times daily (with meals) Yes Saavedra Sheets, DO   losartan-hydrochlorothiazide (HYZAAR) 100-12.5 MG per tablet TAKE ONE TABLET BY MOUTH DAILY Yes Saavedra Sheets, DO   lidocaine-prilocaine (EMLA) 2.5-2.5 % cream Apply topically as needed.  Yes Saavedra Sheets, DO   atorvastatin (LIPITOR) 80 MG tablet Take 1 tablet by mouth daily Yes Srinivas Donato MD   metFORMIN (GLUCOPHAGE) 500 MG tablet TAKE ONE TABLET BY MOUTH TWICE A DAY WITH MEALS Yes Srinivas Donato MD   aspirin 81 MG tablet Take by mouth Yes Historical Provider, MD   omeprazole (PRILOSEC) 20 MG capsule Take 20 mg by mouth daily Yes Historical Provider, MD

## 2020-04-24 RX ORDER — CARVEDILOL 6.25 MG/1
TABLET ORAL
Qty: 180 TABLET | Refills: 0 | Status: SHIPPED | OUTPATIENT
Start: 2020-04-24 | End: 2020-07-22

## 2020-04-24 NOTE — TELEPHONE ENCOUNTER
Last OV 3/12/20  Future Appointments   Date Time Provider Shon Berrios   6/12/2020  1:00 PM DO TEVIN Seymour  MMA

## 2020-04-27 NOTE — TELEPHONE ENCOUNTER
Last OV 3/12/20  Future Appointments   Date Time Provider Shon Berrios   6/12/2020  1:00 PM Debe Signs, DO [facility]  MMA

## 2020-05-11 ENCOUNTER — TELEPHONE (OUTPATIENT)
Dept: ADMINISTRATIVE | Age: 80
End: 2020-05-11

## 2020-05-11 NOTE — TELEPHONE ENCOUNTER
Jennifer Roth from [facility] is calling because it's a medication sertraline (ZOLOFT) 100 MG tablet is on back order and wants to know what the pt could have instead.  Please advise

## 2020-05-13 RX ORDER — ESCITALOPRAM OXALATE 10 MG/1
10 TABLET ORAL DAILY
Qty: 30 TABLET | Refills: 3 | Status: SHIPPED | OUTPATIENT
Start: 2020-05-13 | End: 2020-06-25 | Stop reason: ALTCHOICE

## 2020-05-13 NOTE — TELEPHONE ENCOUNTER
LM for pt to cb to schedule a 2 wk f/u after starting Lexapro. Called pharm. They state the Zoloft was back in the pharmacy & filled that. LM for pt to disregard previous message.

## 2020-05-22 NOTE — TELEPHONE ENCOUNTER
Pt wife called stating that Jennie Vargas has not picked up a new rx for Lexapro. She said he has been taking the Zoloft 100 mg. They were told the Zoloft 25 mg was on back order. He seems to be doing fine taking the 100 mg of Zoloft. Pt has follow up appt scheduled for 6/12.

## 2020-05-26 NOTE — TELEPHONE ENCOUNTER
Please let pt know that if he is doing fine on the Zoloft 100 mg and has enough, then do not start the Lexapro. We can discuss at his upcoming appointment. Thank you.

## 2020-06-25 ENCOUNTER — OFFICE VISIT (OUTPATIENT)
Dept: FAMILY MEDICINE CLINIC | Age: 80
End: 2020-06-25
Payer: MEDICARE

## 2020-06-25 VITALS
WEIGHT: 226 LBS | BODY MASS INDEX: 34.36 KG/M2 | RESPIRATION RATE: 16 BRPM | DIASTOLIC BLOOD PRESSURE: 68 MMHG | TEMPERATURE: 98.3 F | SYSTOLIC BLOOD PRESSURE: 134 MMHG | HEART RATE: 94 BPM | OXYGEN SATURATION: 97 %

## 2020-06-25 PROCEDURE — 99213 OFFICE O/P EST LOW 20 MIN: CPT | Performed by: FAMILY MEDICINE

## 2020-06-25 NOTE — PROGRESS NOTES
 WBC 02/06/2020 6.0  4.0 - 11.0 K/uL Final    RBC 02/06/2020 5.60  4.20 - 5.90 M/uL Final    Hemoglobin 02/06/2020 15.8  13.5 - 17.5 g/dL Final    Hematocrit 02/06/2020 48.9  40.5 - 52.5 % Final    MCV 02/06/2020 87.4  80.0 - 100.0 fL Final    MCH 02/06/2020 28.3  26.0 - 34.0 pg Final    MCHC 02/06/2020 32.3  31.0 - 36.0 g/dL Final    RDW 02/06/2020 15.6* 12.4 - 15.4 % Final    Platelets 13/23/4511 164  135 - 450 K/uL Final    Giant platelets noted on smear, results may be falsely decreased.  MPV 02/06/2020 11.4* 5.0 - 10.5 fL Final    PLATELET SLIDE REVIEW 02/06/2020 Adequate   Final    Neutrophils % 02/06/2020 58.7  % Final    Lymphocytes % 02/06/2020 28.4  % Final    Monocytes % 02/06/2020 7.4  % Final    Eosinophils % 02/06/2020 4.8  % Final    Basophils % 02/06/2020 0.7  % Final    Neutrophils Absolute 02/06/2020 3.5  1.7 - 7.7 K/uL Final    Lymphocytes Absolute 02/06/2020 1.7  1.0 - 5.1 K/uL Final    Monocytes Absolute 02/06/2020 0.4  0.0 - 1.3 K/uL Final    Eosinophils Absolute 02/06/2020 0.3  0.0 - 0.6 K/uL Final    Basophils Absolute 02/06/2020 0.0  0.0 - 0.2 K/uL Final    Sodium 02/06/2020 136  136 - 145 mmol/L Final    Potassium 02/06/2020 4.5  3.5 - 5.1 mmol/L Final    Chloride 02/06/2020 98* 99 - 110 mmol/L Final    CO2 02/06/2020 27  21 - 32 mmol/L Final    Anion Gap 02/06/2020 11  3 - 16 Final    Glucose 02/06/2020 120* 70 - 99 mg/dL Final    BUN 02/06/2020 16  7 - 20 mg/dL Final    CREATININE 02/06/2020 0.9  0.8 - 1.3 mg/dL Final    GFR Non- 02/06/2020 >60  >60 Final    Comment: >60 mL/min/1.73m2 EGFR, calc. for ages 25 and older using the  MDRD formula (not corrected for weight), is valid for stable  renal function.  GFR  02/06/2020 >60  >60 Final    Comment: Chronic Kidney Disease: less than 60 ml/min/1.73 sq.m. Kidney Failure: less than 15 ml/min/1.73 sq.m. Results valid for patients 18 years and older.       Calcium 02/06/2020 9.8  8.3 - 10.6 mg/dL Final    Total Protein 02/06/2020 6.8  6.4 - 8.2 g/dL Final    Alb 02/06/2020 4.2  3.4 - 5.0 g/dL Final    Albumin/Globulin Ratio 02/06/2020 1.6  1.1 - 2.2 Final    Total Bilirubin 02/06/2020 0.9  0.0 - 1.0 mg/dL Final    Alkaline Phosphatase 02/06/2020 69  40 - 129 U/L Final    ALT 02/06/2020 13  10 - 40 U/L Final    AST 02/06/2020 15  15 - 37 U/L Final    Globulin 02/06/2020 2.6  g/dL Final    Cholesterol, Total 02/06/2020 170  0 - 199 mg/dL Final    Triglycerides 02/06/2020 174* 0 - 150 mg/dL Final    HDL 02/06/2020 31* 40 - 60 mg/dL Final    LDL Calculated 02/06/2020 104* <100 mg/dL Final    VLDL Cholesterol Calculated 02/06/2020 35  Not Established mg/dL Final    TSH 02/06/2020 6.03* 0.27 - 4.20 uIU/mL Final       Review of Systems   Neurological: Negative for dizziness. Positive for balance issues   Psychiatric/Behavioral: Positive for dysphoric mood. Negative for suicidal ideas. The patient is nervous/anxious. /68 (Site: Left Upper Arm, Position: Sitting)   Pulse 94   Temp 98.3 °F (36.8 °C) (Infrared)   Resp 16   Wt 226 lb (102.5 kg)   SpO2 97%   BMI 34.36 kg/m²     Physical Exam  Vitals signs reviewed. Constitutional:       Appearance: He is well-developed. HENT:      Head: Normocephalic and atraumatic. Right Ear: Tympanic membrane, ear canal and external ear normal. There is no impacted cerumen. Left Ear: External ear normal. There is impacted cerumen. Eyes:      Pupils: Pupils are equal, round, and reactive to light. Neck:      Musculoskeletal: Normal range of motion. Cardiovascular:      Rate and Rhythm: Normal rate and regular rhythm. Heart sounds: Normal heart sounds. Pulmonary:      Effort: Pulmonary effort is normal.      Breath sounds: Normal breath sounds. Abdominal:      General: Bowel sounds are normal.      Tenderness: There is no abdominal tenderness.    Neurological:      Mental Status: He is alert and oriented to person, place, and time. Psychiatric:         Behavior: Behavior normal.         Thought Content: Thought content normal.         Judgment: Judgment normal.         Plan   Diagnosis Orders   1. Depressed mood  Doing well, continue Zoloft 100 mg   2. Type 2 diabetes mellitus without complication, without long-term current use of insulin (HCC)  TSH without Reflex    Lipid Panel    Comprehensive Metabolic Panel   3. Impacted Cerumen left                                              Diabetic labs ordered today. Return in about 2 months (around 8/25/2020) for Diabetis F/U and in 1 week for ear irrigation. Prior to Visit Medications    Medication Sig Taking? Authorizing Provider   metFORMIN (GLUCOPHAGE) 500 MG tablet Take 1 tablet by mouth 2 times daily (with meals) Yes Juana Muckle, DO   carvedilol (COREG) 6.25 MG tablet TAKE ONE TABLET BY MOUTH TWICE A DAY (WITH MEALS) Yes Juana Carranza, DO   losartan-hydrochlorothiazide (HYZAAR) 100-12.5 MG per tablet TAKE ONE TABLET BY MOUTH DAILY Yes Juana Moorekle, DO   lidocaine-prilocaine (EMLA) 2.5-2.5 % cream Apply topically as needed.  Yes Juana Carranza, DO   atorvastatin (LIPITOR) 80 MG tablet Take 1 tablet by mouth daily Yes Sharyn Soriano MD   aspirin 81 MG tablet Take by mouth Yes Historical Provider, MD   omeprazole (PRILOSEC) 20 MG capsule Take 20 mg by mouth daily Yes Historical Provider, MD

## 2020-06-29 RX ORDER — SERTRALINE HYDROCHLORIDE 100 MG/1
TABLET, FILM COATED ORAL
Qty: 30 TABLET | Refills: 2 | Status: SHIPPED
Start: 2020-06-29 | End: 2020-07-02 | Stop reason: SINTOL

## 2020-07-02 ENCOUNTER — OFFICE VISIT (OUTPATIENT)
Dept: FAMILY MEDICINE CLINIC | Age: 80
End: 2020-07-02
Payer: MEDICARE

## 2020-07-02 VITALS
BODY MASS INDEX: 34.21 KG/M2 | HEART RATE: 67 BPM | TEMPERATURE: 99 F | DIASTOLIC BLOOD PRESSURE: 82 MMHG | OXYGEN SATURATION: 94 % | WEIGHT: 225 LBS | RESPIRATION RATE: 18 BRPM | SYSTOLIC BLOOD PRESSURE: 146 MMHG

## 2020-07-02 PROCEDURE — 99213 OFFICE O/P EST LOW 20 MIN: CPT | Performed by: FAMILY MEDICINE

## 2020-07-02 PROCEDURE — 69210 REMOVE IMPACTED EAR WAX UNI: CPT | Performed by: FAMILY MEDICINE

## 2020-07-02 RX ORDER — ESCITALOPRAM OXALATE 10 MG/1
10 TABLET ORAL DAILY
Qty: 30 TABLET | Refills: 3 | Status: SHIPPED | OUTPATIENT
Start: 2020-07-02 | End: 2020-10-26

## 2020-07-02 NOTE — PATIENT INSTRUCTIONS
- Stop taking Zoloft 100 mg  - Start taking Lexapro 10 mg tonight  - Do labs in 1 month at [address] [place] [address] for dizziness in 2 weeks

## 2020-07-02 NOTE — PROGRESS NOTES
2020    This is a [de-identified] y.o. male   Chief Complaint   Patient presents with    Dizziness     states he is dizzy due to ears being clogged.  Ear Fullness   . HPI  Patient presents today for irrigation of his left ear for impacted cerumen. At patient's last visit he had also admitted to dizziness. Patient states that 3 days ago he became dizzy when getting out of bed. Denies room spinning but states it felt like he was drunk. Episode lasted 2 minutes.   Past Medical History:   Diagnosis Date    CAD (coronary artery disease)     Diabetes ([facility] [address]     Stroke [facility])        Past Surgical History:   Procedure Laterality Date    CARDIAC SURGERY  09/10/2013    3 stents       Social History     Socioeconomic History    Marital status:      Spouse name: Not on file    Number of children: Not on file    Years of education: Not on file    Highest education level: Not on file   Occupational History    Not on file   Social Needs    Financial resource strain: Not on file    Food insecurity     Worry: Not on file     Inability: Not on file    Transportation needs     Medical: Not on file     Non-medical: Not on file   Tobacco Use    Smoking status: Former Smoker     Packs/day: 1.00     Years: 20.00     Pack years: 20.00     Types: Cigarettes     Last attempt to quit: 2002     Years since quittin.5    Smokeless tobacco: Never Used   Substance and Sexual Activity    Alcohol use: No    Drug use: No    Sexual activity: Never   Lifestyle    Physical activity     Days per week: Not on file     Minutes per session: Not on file    Stress: Not on file   Relationships    Social connections     Talks on phone: Not on file     Gets together: Not on file     Attends Spiritism service: Not on file     Active member of club or organization: Not on file     Attends meetings of clubs or organizations: Not on file     Relationship status: Not on file    Intimate partner violence     Fear of current or ex partner: Not on file     Emotionally abused: Not on file     Physically abused: Not on file     Forced sexual activity: Not on file   Other Topics Concern    Not on file   Social History Narrative    Not on file       Family History   Problem Relation Age of Onset    Cancer Father     Heart Disease Father     Diabetes Father        Current Outpatient Medications   Medication Sig Dispense Refill    escitalopram (LEXAPRO) 10 MG tablet Take 1 tablet by mouth daily 30 tablet 3    metFORMIN (GLUCOPHAGE) 500 MG tablet Take 1 tablet by mouth 2 times daily (with meals) 60 tablet 3    carvedilol (COREG) 6.25 MG tablet TAKE ONE TABLET BY MOUTH TWICE A DAY (WITH MEALS) 180 tablet 0    losartan-hydrochlorothiazide (HYZAAR) 100-12.5 MG per tablet TAKE ONE TABLET BY MOUTH DAILY 90 tablet 0    lidocaine-prilocaine (EMLA) 2.5-2.5 % cream Apply topically as needed. 1 Tube 1    atorvastatin (LIPITOR) 80 MG tablet Take 1 tablet by mouth daily 90 tablet 1    aspirin 81 MG tablet Take by mouth      omeprazole (PRILOSEC) 20 MG capsule Take 20 mg by mouth daily       No current facility-administered medications for this visit.         Immunization History   Administered Date(s) Administered    Influenza Vaccine, unspecified formulation 09/07/2016    Influenza, High Dose (Fluzone 65 yrs and older) 09/07/2016, 10/09/2017, 11/19/2018    Pneumococcal Conjugate 13-valent (Ouxkalw05) 10/09/2017    Pneumococcal Polysaccharide ([id number]) 09/29/2016    Tdap (Boostrix, Adacel) 09/10/2019       Allergies   Allergen Reactions    Penicillins Hives and Other (See Comments)       Orders Only on 02/06/2020   Component Date Value Ref Range Status    PSA 02/06/2020 0.09  0.00 - 4.00 ng/mL Final    WBC 02/06/2020 6.0  4.0 - 11.0 K/uL Final    RBC 02/06/2020 5.60  4.20 - 5.90 M/uL Final    Hemoglobin 02/06/2020 15.8  13.5 - 17.5 g/dL Final    Hematocrit 02/06/2020 48.9  40.5 - 52.5 % Final    MCV 02/06/2020 87.4  80.0 - 100.0 fL Final    MCH 02/06/2020 28.3  26.0 - 34.0 pg Final    MCHC 02/06/2020 32.3  31.0 - 36.0 g/dL Final    RDW 02/06/2020 15.6* 12.4 - 15.4 % Final    Platelets 88/22/7656 164  135 - 450 K/uL Final    Giant platelets noted on smear, results may be falsely decreased.  MPV 02/06/2020 11.4* 5.0 - 10.5 fL Final    PLATELET SLIDE REVIEW 02/06/2020 Adequate   Final    Neutrophils % 02/06/2020 58.7  % Final    Lymphocytes % 02/06/2020 28.4  % Final    Monocytes % 02/06/2020 7.4  % Final    Eosinophils % 02/06/2020 4.8  % Final    Basophils % 02/06/2020 0.7  % Final    Neutrophils Absolute 02/06/2020 3.5  1.7 - 7.7 K/uL Final    Lymphocytes Absolute 02/06/2020 1.7  1.0 - 5.1 K/uL Final    Monocytes Absolute 02/06/2020 0.4  0.0 - 1.3 K/uL Final    Eosinophils Absolute 02/06/2020 0.3  0.0 - 0.6 K/uL Final    Basophils Absolute 02/06/2020 0.0  0.0 - 0.2 K/uL Final    Sodium 02/06/2020 136  136 - 145 mmol/L Final    Potassium 02/06/2020 4.5  3.5 - 5.1 mmol/L Final    Chloride 02/06/2020 98* 99 - 110 mmol/L Final    CO2 02/06/2020 27  21 - 32 mmol/L Final    Anion Gap 02/06/2020 11  3 - 16 Final    Glucose 02/06/2020 120* 70 - 99 mg/dL Final    BUN 02/06/2020 16  7 - 20 mg/dL Final    CREATININE 02/06/2020 0.9  0.8 - 1.3 mg/dL Final    GFR Non- 02/06/2020 >60  >60 Final    Comment: >60 mL/min/1.73m2 EGFR, calc. for ages 25 and older using the  MDRD formula (not corrected for weight), is valid for stable  renal function.  GFR  02/06/2020 >60  >60 Final    Comment: Chronic Kidney Disease: less than 60 ml/min/1.73 sq.m. Kidney Failure: less than 15 ml/min/1.73 sq.m. Results valid for patients 18 years and older.       Calcium 02/06/2020 9.8  8.3 - 10.6 mg/dL Final    Total Protein 02/06/2020 6.8  6.4 - 8.2 g/dL Final    Alb 02/06/2020 4.2  3.4 - 5.0 g/dL Final    Albumin/Globulin Ratio 02/06/2020 1.6  1.1 - 2.2 Final    Total Bilirubin 02/06/2020 0.9  0.0 - 1.0 mg/dL Final    Alkaline Phosphatase 02/06/2020 69  40 - 129 U/L Final    ALT 02/06/2020 13  10 - 40 U/L Final    AST 02/06/2020 15  15 - 37 U/L Final    Globulin 02/06/2020 2.6  g/dL Final    Cholesterol, Total 02/06/2020 170  0 - 199 mg/dL Final    Triglycerides 02/06/2020 174* 0 - 150 mg/dL Final    HDL 02/06/2020 31* 40 - 60 mg/dL Final    LDL Calculated 02/06/2020 104* <100 mg/dL Final    VLDL Cholesterol Calculated 02/06/2020 35  Not Established mg/dL Final    TSH 02/06/2020 6.03* 0.27 - 4.20 uIU/mL Final       Review of Systems   HENT:        Left impacted cerumen   Neurological: Positive for dizziness. BP (!) 146/82 (Site: Left Upper Arm, Position: Sitting)   Pulse 67   Temp 99 °F (37.2 °C) (Infrared)   Resp 18   Wt 225 lb (102.1 kg)   SpO2 94%   BMI 34.21 kg/m²     Physical Exam  Vitals signs reviewed. Constitutional:       Appearance: He is well-developed. HENT:      Head: Normocephalic and atraumatic. Ears:      Comments: Mild excess cerumen left, excess cerumen right  Eyes:      Pupils: Pupils are equal, round, and reactive to light. Neck:      Musculoskeletal: Normal range of motion. Pulmonary:      Effort: Pulmonary effort is normal.      Breath sounds: Normal breath sounds. Neurological:      Mental Status: He is alert and oriented to person, place, and time. Psychiatric:         Behavior: Behavior normal.         Thought Content: Thought content normal.         Judgment: Judgment normal.         Plan   Diagnosis Orders   1. Excessive cerumen in ear canal, right  43434 - MA REMOVE IMPACTED EAR WAX   2. Depressed mood  escitalopram (LEXAPRO) 10 MG tablet   3. Dizziness       - Discontinue Zoloft 100 mg due to potential side effect of dizziness  - Start Lexapro 10 mg tonight      Return in about 2 weeks (around 7/16/2020) for Dizziness/Lexapro F/U. Prior to Visit Medications    Medication Sig Taking?  Authorizing Provider   escitalopram (LEXAPRO) 10 MG tablet

## 2020-07-16 ENCOUNTER — OFFICE VISIT (OUTPATIENT)
Dept: FAMILY MEDICINE CLINIC | Age: 80
End: 2020-07-16
Payer: MEDICARE

## 2020-07-16 VITALS
DIASTOLIC BLOOD PRESSURE: 68 MMHG | SYSTOLIC BLOOD PRESSURE: 122 MMHG | TEMPERATURE: 97.7 F | HEART RATE: 63 BPM | OXYGEN SATURATION: 91 % | WEIGHT: 229 LBS | BODY MASS INDEX: 34.82 KG/M2 | RESPIRATION RATE: 18 BRPM

## 2020-07-16 PROCEDURE — 99214 OFFICE O/P EST MOD 30 MIN: CPT | Performed by: FAMILY MEDICINE

## 2020-07-16 ASSESSMENT — ENCOUNTER SYMPTOMS
ABDOMINAL PAIN: 0
TROUBLE SWALLOWING: 1

## 2020-07-16 NOTE — PROGRESS NOTES
7/16/2020    This is a [de-identified] y.o. male   Chief Complaint   Patient presents with    2 Week Follow-Up     pt states he is sleeping better, but if he gets up too quickly, he gets dizzy    Depression     pt states he can not tell much difference   . HPI  Presents today for 2-week follow-up for dizziness and Lexapro use. Currently taking Lexapro 10 mg. At patient's last appointment on July 2, 2020 he had stated that 3 days prior he had become dizzy while getting out of bed. Denied room spinning but stated that it felt like he was drunk and the episode lasted 2 minutes. Patient's right ear was irrigated during that visit and he was told to stop Zoloft 100 mg due to potential side effect of dizziness. It was at that visit that the Lexapro 10 mg was started. States that whenever he gets up quickly he feels dizzy, denies episodes when sitting or standing still like he had previously. Denies HA's. States that the Lexapro is working and doing what it should, states he is sleeping longer, also feels that it is helping is anxiety, denies fatigue, denies abdominal pain or seasonal allergies. Also admits to a skin tag on his right side of neck. Admits to difficulty swallowing for the last 2 years.   Past Medical History:   Diagnosis Date    CAD (coronary artery disease)     Diabetes ([facility] [address]     Stroke [facility])        Past Surgical History:   Procedure Laterality Date    CARDIAC SURGERY  09/10/2013    3 stents       Social History     Socioeconomic History    Marital status:      Spouse name: Not on file    Number of children: Not on file    Years of education: Not on file    Highest education level: Not on file   Occupational History    Not on file   Social Needs    Financial resource strain: Not on file    Food insecurity     Worry: Not on file     Inability: Not on file    Transportation needs     Medical: Not on file     Non-medical: Not on file   Tobacco Use    Smoking status: Former Smoker Packs/day: 1.00     Years: 20.00     Pack years: 20.00     Types: Cigarettes     Last attempt to quit: 2002     Years since quittin.5    Smokeless tobacco: Never Used   Substance and Sexual Activity    Alcohol use: No    Drug use: No    Sexual activity: Never   Lifestyle    Physical activity     Days per week: Not on file     Minutes per session: Not on file    Stress: Not on file   Relationships    Social connections     Talks on phone: Not on file     Gets together: Not on file     Attends Spiritism service: Not on file     Active member of club or organization: Not on file     Attends meetings of clubs or organizations: Not on file     Relationship status: Not on file    Intimate partner violence     Fear of current or ex partner: Not on file     Emotionally abused: Not on file     Physically abused: Not on file     Forced sexual activity: Not on file   Other Topics Concern    Not on file   Social History Narrative    Not on file       Family History   Problem Relation Age of Onset    Cancer Father     Heart Disease Father     Diabetes Father        Current Outpatient Medications   Medication Sig Dispense Refill    escitalopram (LEXAPRO) 10 MG tablet Take 1 tablet by mouth daily 30 tablet 3    metFORMIN (GLUCOPHAGE) 500 MG tablet Take 1 tablet by mouth 2 times daily (with meals) 60 tablet 3    carvedilol (COREG) 6.25 MG tablet TAKE ONE TABLET BY MOUTH TWICE A DAY (WITH MEALS) 180 tablet 0    losartan-hydrochlorothiazide (HYZAAR) 100-12.5 MG per tablet TAKE ONE TABLET BY MOUTH DAILY 90 tablet 0    lidocaine-prilocaine (EMLA) 2.5-2.5 % cream Apply topically as needed. 1 Tube 1    atorvastatin (LIPITOR) 80 MG tablet Take 1 tablet by mouth daily 90 tablet 1    aspirin 81 MG tablet Take by mouth      omeprazole (PRILOSEC) 20 MG capsule Take 20 mg by mouth daily       No current facility-administered medications for this visit.         Immunization History   Administered Date(s) Appearance: He is well-developed. HENT:      Head: Normocephalic and atraumatic. Eyes:      Pupils: Pupils are equal, round, and reactive to light. Neck:      Musculoskeletal: Normal range of motion. Cardiovascular:      Rate and Rhythm: Normal rate and regular rhythm. Heart sounds: Normal heart sounds. Pulmonary:      Effort: Pulmonary effort is normal.      Breath sounds: Normal breath sounds. Abdominal:      General: Bowel sounds are normal.      Tenderness: There is no abdominal tenderness. Skin:     Comments: 0.75 skin tag on posterior right side of neck. Neurological:      Mental Status: He is alert and oriented to person, place, and time. Psychiatric:         Behavior: Behavior normal.         Thought Content: Thought content normal.         Judgment: Judgment normal.         Plan   Diagnosis Orders   1. Dizziness  Improved, still having some orthostatic type dizziness, counseled pt to stand up slowly after sitting for long periods   2. Depressed mood  Improved, continue Lexapro 10 mg       3. Skin Tag                                                                   Will remove in the future    4       Difficulty swallowing                                                 Referral,GI, Dr. Boulder du simone    Pt will make appointment for skin tag removal.  Prior to Visit Medications    Medication Sig Taking? Authorizing Provider   escitalopram (LEXAPRO) 10 MG tablet Take 1 tablet by mouth daily Yes Roberto Beltran DO   metFORMIN (GLUCOPHAGE) 500 MG tablet Take 1 tablet by mouth 2 times daily (with meals) Yes Roberto Beltran DO   carvedilol (COREG) 6.25 MG tablet TAKE ONE TABLET BY MOUTH TWICE A DAY (WITH MEALS) Yes Roberto Beltran DO   losartan-hydrochlorothiazide (HYZAAR) 100-12.5 MG per tablet TAKE ONE TABLET BY MOUTH DAILY Yes Roberto Beltran DO   lidocaine-prilocaine (EMLA) 2.5-2.5 % cream Apply topically as needed.  Yes Roberto Beltran DO   atorvastatin (LIPITOR) 80 MG tablet Take 1 tablet by mouth daily Yes Madelaine Berg MD   aspirin 81 MG tablet Take by mouth Yes Historical Provider, MD   omeprazole (PRILOSEC) 20 MG capsule Take 20 mg by mouth daily Yes Historical Provider, MD

## 2020-07-27 ENCOUNTER — TELEPHONE (OUTPATIENT)
Dept: FAMILY MEDICINE CLINIC | Age: 80
End: 2020-07-27

## 2020-07-27 RX ORDER — LOSARTAN POTASSIUM AND HYDROCHLOROTHIAZIDE 12.5; 1 MG/1; MG/1
TABLET ORAL
Qty: 90 TABLET | Refills: 0 | Status: SHIPPED | OUTPATIENT
Start: 2020-07-27 | End: 2020-10-21

## 2020-07-27 NOTE — TELEPHONE ENCOUNTER
Last ov 07/16/2020   Future Appointments   Date Time Provider Shon Berrios   7/28/2020  8:00 AM DO TEVIN Morgan  MMA

## 2020-07-27 NOTE — TELEPHONE ENCOUNTER
Please call pt and ask if he takes aspirin everyday. He has a skin tag removal visit tomorrow morning and I'd like for him not to take it either today or tomorrow. Thank you.

## 2020-07-27 NOTE — TELEPHONE ENCOUNTER
Spoke to wife Curtis Bailon she said he may have taken it this am, however he will not take in the morning.

## 2020-07-28 ENCOUNTER — OFFICE VISIT (OUTPATIENT)
Dept: FAMILY MEDICINE CLINIC | Age: 80
End: 2020-07-28
Payer: MEDICARE

## 2020-07-28 VITALS
WEIGHT: 227 LBS | SYSTOLIC BLOOD PRESSURE: 138 MMHG | RESPIRATION RATE: 16 BRPM | TEMPERATURE: 98.5 F | OXYGEN SATURATION: 91 % | HEART RATE: 61 BPM | DIASTOLIC BLOOD PRESSURE: 84 MMHG | BODY MASS INDEX: 34.52 KG/M2

## 2020-07-28 PROCEDURE — 11200 RMVL SKIN TAGS UP TO&INC 15: CPT | Performed by: FAMILY MEDICINE

## 2020-07-28 NOTE — PROGRESS NOTES
2020    This is a [de-identified] y.o. male   Chief Complaint   Patient presents with    Skin Tag     removal   .    HPI  Patient presents today for skin tag removal from the right posterior neck. Patient has numerous skin tags on the upper back and both sides of the neck.   Past Medical History:   Diagnosis Date    CAD (coronary artery disease)     Diabetes ([facility] [address]     Stroke [facility])        Past Surgical History:   Procedure Laterality Date    CARDIAC SURGERY  09/10/2013    3 stents       Social History     Socioeconomic History    Marital status:      Spouse name: Not on file    Number of children: Not on file    Years of education: Not on file    Highest education level: Not on file   Occupational History    Not on file   Social Needs    Financial resource strain: Not on file    Food insecurity     Worry: Not on file     Inability: Not on file    Transportation needs     Medical: Not on file     Non-medical: Not on file   Tobacco Use    Smoking status: Former Smoker     Packs/day: 1.00     Years: 20.00     Pack years: 20.00     Types: Cigarettes     Last attempt to quit: 2002     Years since quittin.5    Smokeless tobacco: Never Used   Substance and Sexual Activity    Alcohol use: No    Drug use: No    Sexual activity: Never   Lifestyle    Physical activity     Days per week: Not on file     Minutes per session: Not on file    Stress: Not on file   Relationships    Social connections     Talks on phone: Not on file     Gets together: Not on file     Attends [employer] service: Not on file     Active member of club or organization: Not on file     Attends meetings of clubs or organizations: Not on file     Relationship status: Not on file    Intimate partner violence     Fear of current or ex partner: Not on file     Emotionally abused: Not on file     Physically abused: Not on file     Forced sexual activity: Not on file   Other Topics Concern    Not on file   Social History Narrative  Not on file       Family History   Problem Relation Age of Onset    Cancer Father     Heart Disease Father     Diabetes Father        Current Outpatient Medications   Medication Sig Dispense Refill    losartan-hydroCHLOROthiazide (HYZAAR) 100-12.5 MG per tablet TAKE ONE TABLET BY MOUTH DAILY 90 tablet 0    carvedilol (COREG) 6.25 MG tablet TAKE ONE TABLET BY MOUTH TWICE A DAY WITH A MEAL 180 tablet 1    escitalopram (LEXAPRO) 10 MG tablet Take 1 tablet by mouth daily 30 tablet 3    metFORMIN (GLUCOPHAGE) 500 MG tablet Take 1 tablet by mouth 2 times daily (with meals) 60 tablet 3    lidocaine-prilocaine (EMLA) 2.5-2.5 % cream Apply topically as needed. 1 Tube 1    atorvastatin (LIPITOR) 80 MG tablet Take 1 tablet by mouth daily 90 tablet 1    aspirin 81 MG tablet Take by mouth      omeprazole (PRILOSEC) 20 MG capsule Take 20 mg by mouth daily       No current facility-administered medications for this visit.         Immunization History   Administered Date(s) Administered    Influenza Vaccine, unspecified formulation 09/07/2016    Influenza, High Dose (Fluzone 65 yrs and older) 09/07/2016, 10/09/2017, 11/19/2018    Pneumococcal Conjugate 13-valent (Elodrot34) 10/09/2017    Pneumococcal Polysaccharide (Igvsyzhsx61) 09/29/2016    Tdap (Boostrix, Adacel) 09/10/2019       Allergies   Allergen Reactions    Penicillins Hives and Other (See Comments)       Orders Only on 02/06/2020   Component Date Value Ref Range Status    PSA 02/06/2020 0.09  0.00 - 4.00 ng/mL Final    WBC 02/06/2020 6.0  4.0 - 11.0 K/uL Final    RBC 02/06/2020 5.60  4.20 - 5.90 M/uL Final    Hemoglobin 02/06/2020 15.8  13.5 - 17.5 g/dL Final    Hematocrit 02/06/2020 48.9  40.5 - 52.5 % Final    MCV 02/06/2020 87.4  80.0 - 100.0 fL Final    MCH 02/06/2020 28.3  26.0 - 34.0 pg Final    MCHC 02/06/2020 32.3  31.0 - 36.0 g/dL Final    RDW 02/06/2020 15.6* 12.4 - 15.4 % Final    Platelets 02/86/6190 164  135 - 450 K/uL Final Giant platelets noted on smear, results may be falsely decreased.  MPV 02/06/2020 11.4* 5.0 - 10.5 fL Final    PLATELET SLIDE REVIEW 02/06/2020 Adequate   Final    Neutrophils % 02/06/2020 58.7  % Final    Lymphocytes % 02/06/2020 28.4  % Final    Monocytes % 02/06/2020 7.4  % Final    Eosinophils % 02/06/2020 4.8  % Final    Basophils % 02/06/2020 0.7  % Final    Neutrophils Absolute 02/06/2020 3.5  1.7 - 7.7 K/uL Final    Lymphocytes Absolute 02/06/2020 1.7  1.0 - 5.1 K/uL Final    Monocytes Absolute 02/06/2020 0.4  0.0 - 1.3 K/uL Final    Eosinophils Absolute 02/06/2020 0.3  0.0 - 0.6 K/uL Final    Basophils Absolute 02/06/2020 0.0  0.0 - 0.2 K/uL Final    Sodium 02/06/2020 136  136 - 145 mmol/L Final    Potassium 02/06/2020 4.5  3.5 - 5.1 mmol/L Final    Chloride 02/06/2020 98* 99 - 110 mmol/L Final    CO2 02/06/2020 27  21 - 32 mmol/L Final    Anion Gap 02/06/2020 11  3 - 16 Final    Glucose 02/06/2020 120* 70 - 99 mg/dL Final    BUN 02/06/2020 16  7 - 20 mg/dL Final    CREATININE 02/06/2020 0.9  0.8 - 1.3 mg/dL Final    GFR Non- 02/06/2020 >60  >60 Final    Comment: >60 mL/min/1.73m2 EGFR, calc. for ages 25 and older using the  MDRD formula (not corrected for weight), is valid for stable  renal function.  GFR  02/06/2020 >60  >60 Final    Comment: Chronic Kidney Disease: less than 60 ml/min/1.73 sq.m. Kidney Failure: less than 15 ml/min/1.73 sq.m. Results valid for patients 18 years and older.       Calcium 02/06/2020 9.8  8.3 - 10.6 mg/dL Final    Total Protein 02/06/2020 6.8  6.4 - 8.2 g/dL Final    Alb 02/06/2020 4.2  3.4 - 5.0 g/dL Final    Albumin/Globulin Ratio 02/06/2020 1.6  1.1 - 2.2 Final    Total Bilirubin 02/06/2020 0.9  0.0 - 1.0 mg/dL Final    Alkaline Phosphatase 02/06/2020 69  40 - 129 U/L Final    ALT 02/06/2020 13  10 - 40 U/L Final    AST 02/06/2020 15  15 - 37 U/L Final    Globulin 02/06/2020 2.6  g/dL Final    Cholesterol, Total 02/06/2020 170  0 - 199 mg/dL Final    Triglycerides 02/06/2020 174* 0 - 150 mg/dL Final    HDL 02/06/2020 31* 40 - 60 mg/dL Final    LDL Calculated 02/06/2020 104* <100 mg/dL Final    VLDL Cholesterol Calculated 02/06/2020 35  Not Established mg/dL Final    TSH 02/06/2020 6.03* 0.27 - 4.20 uIU/mL Final       Review of Systems   Skin:        Skin tags on right side of neck and on upper back       /84 (Site: Left Upper Arm, Position: Sitting)   Pulse 61   Temp 98.5 °F (36.9 °C) (Temporal)   Resp 16   Wt 227 lb (103 kg)   SpO2 91%   BMI 34.52 kg/m²     Physical Exam  Vitals signs reviewed. Constitutional:       Appearance: He is well-developed. HENT:      Head: Normocephalic and atraumatic. Eyes:      Pupils: Pupils are equal, round, and reactive to light. Neck:      Musculoskeletal: Normal range of motion. Cardiovascular:      Rate and Rhythm: Normal rate and regular rhythm. Heart sounds: Normal heart sounds. Pulmonary:      Effort: Pulmonary effort is normal.      Breath sounds: Normal breath sounds. Abdominal:      General: Bowel sounds are normal.   Skin:     Comments: 0.5 cm x 3 mm skin tag on right posterior neck. Two 3 mm skin tags on upper back   Neurological:      Mental Status: He is alert and oriented to person, place, and time. Psychiatric:         Behavior: Behavior normal.         Thought Content: Thought content normal.         Judgment: Judgment normal.         Plan   Diagnosis Orders   1. Skin tag  65321 - ME REMOVAL OF SKIN TAGS, UP TO 15     Area prepped with alcohol pad. Cryotherapy used to freeze skin tag on right posterior neck and 2 skin tags on upper back, Polysporin and Band-Aids applied afterwards. Patient tolerated procedure well. Return in about 1 week (around 8/4/2020) for Skin tag follow-up. Prior to Visit Medications    Medication Sig Taking?  Authorizing Provider   losartan-hydroCHLOROthiazide (HYZAAR) 100-12.5 MG per tablet TAKE ONE TABLET BY MOUTH DAILY Yes Tanika Marking, DO   carvedilol (COREG) 6.25 MG tablet TAKE ONE TABLET BY MOUTH TWICE A DAY WITH A MEAL Yes Whit Chan DO   escitalopram (LEXAPRO) 10 MG tablet Take 1 tablet by mouth daily Yes Tanika Marking, DO   metFORMIN (GLUCOPHAGE) 500 MG tablet Take 1 tablet by mouth 2 times daily (with meals) Yes Tanika Marking, DO   lidocaine-prilocaine (EMLA) 2.5-2.5 % cream Apply topically as needed.  Yes Tanika Marking, DO   atorvastatin (LIPITOR) 80 MG tablet Take 1 tablet by mouth daily Yes Cindy Batista MD   aspirin 81 MG tablet Take by mouth Yes Historical Provider, MD   omeprazole (PRILOSEC) 20 MG capsule Take 20 mg by mouth daily Yes Historical Provider, MD

## 2020-08-05 DIAGNOSIS — E11.9 TYPE 2 DIABETES MELLITUS WITHOUT COMPLICATION, WITHOUT LONG-TERM CURRENT USE OF INSULIN (HCC): ICD-10-CM

## 2020-08-06 ENCOUNTER — OFFICE VISIT (OUTPATIENT)
Dept: FAMILY MEDICINE CLINIC | Age: 80
End: 2020-08-06
Payer: MEDICARE

## 2020-08-06 VITALS
BODY MASS INDEX: 34.52 KG/M2 | TEMPERATURE: 97.3 F | SYSTOLIC BLOOD PRESSURE: 126 MMHG | WEIGHT: 227 LBS | HEART RATE: 72 BPM | DIASTOLIC BLOOD PRESSURE: 74 MMHG | OXYGEN SATURATION: 92 % | RESPIRATION RATE: 18 BRPM

## 2020-08-06 LAB
A/G RATIO: 1.9 (ref 1.1–2.2)
ALBUMIN SERPL-MCNC: 4.3 G/DL (ref 3.4–5)
ALP BLD-CCNC: 63 U/L (ref 40–129)
ALT SERPL-CCNC: 14 U/L (ref 10–40)
ANION GAP SERPL CALCULATED.3IONS-SCNC: 12 MMOL/L (ref 3–16)
AST SERPL-CCNC: 18 U/L (ref 15–37)
BILIRUB SERPL-MCNC: 1.1 MG/DL (ref 0–1)
BUN BLDV-MCNC: 11 MG/DL (ref 7–20)
CALCIUM SERPL-MCNC: 9.3 MG/DL (ref 8.3–10.6)
CHLORIDE BLD-SCNC: 100 MMOL/L (ref 99–110)
CHOLESTEROL, TOTAL: 212 MG/DL (ref 0–199)
CO2: 28 MMOL/L (ref 21–32)
CREAT SERPL-MCNC: 1 MG/DL (ref 0.8–1.3)
GFR AFRICAN AMERICAN: >60
GFR NON-AFRICAN AMERICAN: >60
GLOBULIN: 2.3 G/DL
GLUCOSE BLD-MCNC: 93 MG/DL (ref 70–99)
HDLC SERPL-MCNC: 31 MG/DL (ref 40–60)
LDL CHOLESTEROL CALCULATED: 131 MG/DL
POTASSIUM SERPL-SCNC: 4.6 MMOL/L (ref 3.5–5.1)
SODIUM BLD-SCNC: 140 MMOL/L (ref 136–145)
TOTAL PROTEIN: 6.6 G/DL (ref 6.4–8.2)
TRIGL SERPL-MCNC: 252 MG/DL (ref 0–150)
TSH SERPL DL<=0.05 MIU/L-ACNC: 6.93 UIU/ML (ref 0.27–4.2)
VLDLC SERPL CALC-MCNC: 50 MG/DL

## 2020-08-06 PROCEDURE — 99024 POSTOP FOLLOW-UP VISIT: CPT | Performed by: FAMILY MEDICINE

## 2020-08-06 PROCEDURE — 11200 RMVL SKIN TAGS UP TO&INC 15: CPT | Performed by: FAMILY MEDICINE

## 2020-08-06 ASSESSMENT — ENCOUNTER SYMPTOMS: SHORTNESS OF BREATH: 0

## 2020-08-06 NOTE — PROGRESS NOTES
09/10/2019       Allergies   Allergen Reactions    Penicillins Hives and Other (See Comments)       Orders Only on 08/05/2020   Component Date Value Ref Range Status    Sodium 08/05/2020 140  136 - 145 mmol/L Final    Potassium 08/05/2020 4.6  3.5 - 5.1 mmol/L Final    Chloride 08/05/2020 100  99 - 110 mmol/L Final    CO2 08/05/2020 28  21 - 32 mmol/L Final    Anion Gap 08/05/2020 12  3 - 16 Final    Glucose 08/05/2020 93  70 - 99 mg/dL Final    BUN 08/05/2020 11  7 - 20 mg/dL Final    CREATININE 08/05/2020 1.0  0.8 - 1.3 mg/dL Final    GFR Non- 08/05/2020 >60  >60 Final    Comment: >60 mL/min/1.73m2 EGFR, calc. for ages 25 and older using the  MDRD formula (not corrected for weight), is valid for stable  renal function.  GFR  08/05/2020 >60  >60 Final    Comment: Chronic Kidney Disease: less than 60 ml/min/1.73 sq.m. Kidney Failure: less than 15 ml/min/1.73 sq.m. Results valid for patients 18 years and older.  Calcium 08/05/2020 9.3  8.3 - 10.6 mg/dL Final    Total Protein 08/05/2020 6.6  6.4 - 8.2 g/dL Final    Alb 08/05/2020 4.3  3.4 - 5.0 g/dL Final    Albumin/Globulin Ratio 08/05/2020 1.9  1.1 - 2.2 Final    Total Bilirubin 08/05/2020 1.1* 0.0 - 1.0 mg/dL Final    Alkaline Phosphatase 08/05/2020 63  40 - 129 U/L Final    ALT 08/05/2020 14  10 - 40 U/L Final    AST 08/05/2020 18  15 - 37 U/L Final    Globulin 08/05/2020 2.3  g/dL Final    Cholesterol, Total 08/05/2020 212* 0 - 199 mg/dL Final    Triglycerides 08/05/2020 252* 0 - 150 mg/dL Final    HDL 08/05/2020 31* 40 - 60 mg/dL Final    LDL Calculated 08/05/2020 131* <100 mg/dL Final    VLDL Cholesterol Calculated 08/05/2020 50  Not Established mg/dL Final    TSH 08/05/2020 6.93* 0.27 - 4.20 uIU/mL Final       Review of Systems   Constitutional: Positive for fatigue. Negative for unexpected weight change. Respiratory: Negative for shortness of breath.     Cardiovascular: Negative for on the neck as it should fall off within the next week. A dermatology referral was placed for remaining numerous skin tags on back, neck, and upper chest.    Return in about 3 months (around 11/6/2020) for Lipid Panel F/U. Prior to Visit Medications    Medication Sig Taking? Authorizing Provider   losartan-hydroCHLOROthiazide (HYZAAR) 100-12.5 MG per tablet TAKE ONE TABLET BY MOUTH DAILY Yes Babetta Sandifer, DO   carvedilol (COREG) 6.25 MG tablet TAKE ONE TABLET BY MOUTH TWICE A DAY WITH A MEAL Yes Haydee Chan, DO   escitalopram (LEXAPRO) 10 MG tablet Take 1 tablet by mouth daily Yes Babetta Sandifer, DO   metFORMIN (GLUCOPHAGE) 500 MG tablet Take 1 tablet by mouth 2 times daily (with meals) Yes Babetta Sandifer, DO   lidocaine-prilocaine (EMLA) 2.5-2.5 % cream Apply topically as needed.  Yes Babetta Sandifer, DO   atorvastatin (LIPITOR) 80 MG tablet Take 1 tablet by mouth daily Yes Juanpablo Neumann MD   aspirin 81 MG tablet Take by mouth Yes Historical Provider, MD   omeprazole (PRILOSEC) 20 MG capsule Take 20 mg by mouth daily Yes Historical Provider, MD

## 2020-08-07 LAB
T3 FREE: 2.5 PG/ML (ref 2.3–4.2)
T4 FREE: 0.8 NG/DL (ref 0.9–1.8)

## 2020-08-16 RX ORDER — LEVOTHYROXINE SODIUM 0.03 MG/1
25 TABLET ORAL DAILY
Qty: 90 TABLET | Refills: 1 | Status: SHIPPED
Start: 2020-08-16 | End: 2021-01-19 | Stop reason: DRUGHIGH

## 2020-09-20 DIAGNOSIS — E11.9 TYPE 2 DIABETES MELLITUS WITHOUT COMPLICATION, WITHOUT LONG-TERM CURRENT USE OF INSULIN (HCC): Primary | ICD-10-CM

## 2020-10-21 RX ORDER — LOSARTAN POTASSIUM AND HYDROCHLOROTHIAZIDE 12.5; 1 MG/1; MG/1
TABLET ORAL
Qty: 90 TABLET | Refills: 1 | Status: SHIPPED | OUTPATIENT
Start: 2020-10-21 | End: 2021-04-12

## 2020-10-26 RX ORDER — ESCITALOPRAM OXALATE 10 MG/1
TABLET ORAL
Qty: 30 TABLET | Refills: 2 | Status: SHIPPED | OUTPATIENT
Start: 2020-10-26 | End: 2021-01-22

## 2021-01-19 ENCOUNTER — OFFICE VISIT (OUTPATIENT)
Dept: FAMILY MEDICINE CLINIC | Age: 81
End: 2021-01-19
Payer: MEDICARE

## 2021-01-19 VITALS
WEIGHT: 219 LBS | OXYGEN SATURATION: 92 % | SYSTOLIC BLOOD PRESSURE: 138 MMHG | RESPIRATION RATE: 16 BRPM | DIASTOLIC BLOOD PRESSURE: 78 MMHG | TEMPERATURE: 98 F | BODY MASS INDEX: 33.3 KG/M2 | HEART RATE: 69 BPM

## 2021-01-19 DIAGNOSIS — E03.9 HYPOTHYROIDISM, UNSPECIFIED TYPE: Primary | ICD-10-CM

## 2021-01-19 DIAGNOSIS — M25.512 ACUTE PAIN OF LEFT SHOULDER: ICD-10-CM

## 2021-01-19 DIAGNOSIS — H91.93 BILATERAL HEARING LOSS, UNSPECIFIED HEARING LOSS TYPE: Primary | ICD-10-CM

## 2021-01-19 DIAGNOSIS — R68.89 COLD INTOLERANCE: ICD-10-CM

## 2021-01-19 DIAGNOSIS — Z91.81 AT HIGH RISK FOR FALLS: ICD-10-CM

## 2021-01-19 PROCEDURE — 99214 OFFICE O/P EST MOD 30 MIN: CPT | Performed by: FAMILY MEDICINE

## 2021-01-19 PROCEDURE — G8510 SCR DEP NEG, NO PLAN REQD: HCPCS | Performed by: FAMILY MEDICINE

## 2021-01-19 PROCEDURE — 3288F FALL RISK ASSESSMENT DOCD: CPT | Performed by: FAMILY MEDICINE

## 2021-01-19 RX ORDER — LEVOTHYROXINE SODIUM 0.05 MG/1
50 TABLET ORAL DAILY
Qty: 30 TABLET | Refills: 5 | Status: SHIPPED
Start: 2021-01-19 | End: 2021-03-23 | Stop reason: DRUGHIGH

## 2021-01-19 ASSESSMENT — PATIENT HEALTH QUESTIONNAIRE - PHQ9
SUM OF ALL RESPONSES TO PHQ QUESTIONS 1-9: 0
SUM OF ALL RESPONSES TO PHQ QUESTIONS 1-9: 0
1. LITTLE INTEREST OR PLEASURE IN DOING THINGS: 0
SUM OF ALL RESPONSES TO PHQ QUESTIONS 1-9: 0

## 2021-01-19 NOTE — PROGRESS NOTES
1/19/2021    This is a 79 y.o. male   Chief Complaint   Patient presents with   Pamella Ham     few wks ago, R side rib cage, L shoulder pain   . HPI  She presents today for lab follow-up and to discuss recent fall. On August 16 patient was notified that his free T4 thyroid level was decreased and he was started on Synthroid 25 mcg with instructions to have his thyroid rechecked in 6 weeks. States today that he is taking his Synthroid on a regular basis. His labs from January 8 show free T4 of 1.02 and TSH of 3.390. Patient admits to chills and feeling cold states that he has been having to wear additional clothing in the house. Patient slipped and fell on ice in December and states he went to a local urgent care. Denies dizziness or loss of consciousness before the fall, was having right-sided rib pain at the time that has since resolved. However, admits to left shoulder pain that he thinks originated with the fall but has not resolved. Admits to anterior left shoulder tenderness and some decreased range of motion. Also admits to waking at night and states that he is having difficulty hearing despite use of hearing aids and would like an audiology referral.  Has seen audiology in the past but states that his problem is not so much needing for people to talk louder, but instead having difficulty understanding what people are saying despite using hearing aids.   Is interested in having another audiology referral.    Past Medical History:   Diagnosis Date    CAD (coronary artery disease)     Diabetes ([facility] [address]     Stroke [facility])        Past Surgical History:   Procedure Laterality Date    CARDIAC SURGERY  09/10/2013    3 stents       Social History     Socioeconomic History    Marital status:      Spouse name: Not on file    Number of children: Not on file    Years of education: Not on file    Highest education level: Not on file   Occupational History    Not on file Social Needs    Financial resource strain: Not on file    Food insecurity     Worry: Not on file     Inability: Not on file    Transportation needs     Medical: Not on file     Non-medical: Not on file   Tobacco Use    Smoking status: Former Smoker     Packs/day: 1.00     Years: 20.00     Pack years: 20.00     Types: Cigarettes     Quit date: 2002     Years since quittin.0    Smokeless tobacco: Never Used   Substance and Sexual Activity    Alcohol use: No    Drug use: No    Sexual activity: Never   Lifestyle    Physical activity     Days per week: Not on file     Minutes per session: Not on file    Stress: Not on file   Relationships    Social connections     Talks on phone: Not on file     Gets together: Not on file     Attends Baptism service: Not on file     Active member of club or organization: Not on file     Attends meetings of clubs or organizations: Not on file     Relationship status: Not on file    Intimate partner violence     Fear of current or ex partner: Not on file     Emotionally abused: Not on file     Physically abused: Not on file     Forced sexual activity: Not on file   Other Topics Concern    Not on file   Social History Narrative    Not on file       Family History   Problem Relation Age of Onset    Cancer Father     Heart Disease Father     Diabetes Father        Current Outpatient Medications   Medication Sig Dispense Refill    levothyroxine (SYNTHROID) 50 MCG tablet Take 1 tablet by mouth daily 30 tablet 5    escitalopram (LEXAPRO) 10 MG tablet TAKE ONE TABLET BY MOUTH DAILY 30 tablet 2    losartan-hydroCHLOROthiazide (HYZAAR) 100-12.5 MG per tablet TAKE ONE TABLET BY MOUTH DAILY 90 tablet 1    metFORMIN (GLUCOPHAGE) 500 MG tablet Take 1 tablet by mouth daily (with breakfast) (Patient taking differently: Take 500 mg by mouth daily (with breakfast) Not every night) 90 tablet 1  carvedilol (COREG) 6.25 MG tablet TAKE ONE TABLET BY MOUTH TWICE A DAY WITH A MEAL (Patient taking differently: Not every night) 180 tablet 1    atorvastatin (LIPITOR) 80 MG tablet Take 1 tablet by mouth daily 90 tablet 1    aspirin 81 MG tablet Take by mouth      omeprazole (PRILOSEC) 20 MG capsule Take 20 mg by mouth daily       No current facility-administered medications for this visit. Immunization History   Administered Date(s) Administered    Influenza Vaccine, unspecified formulation 09/07/2016    Influenza, High Dose (Fluzone 65 yrs and older) 09/07/2016, 10/09/2017, 11/19/2018    Pneumococcal Conjugate 13-valent ([id number]) 10/09/2017    Pneumococcal Polysaccharide ([id number]) 09/29/2016    Tdap (Boostrix, Adacel) 09/10/2019       Allergies   Allergen Reactions    Penicillins Hives and Other (See Comments)       Office Visit on 08/06/2020   Component Date Value Ref Range Status    T4 Free 08/06/2020 0.8* 0.9 - 1.8 ng/dL Final    T3, Free 08/06/2020 2.5  2.3 - 4.2 pg/mL Final       Review of Systems   Constitutional: Positive for chills. HENT: Positive for hearing loss. Endocrine: Positive for cold intolerance. Musculoskeletal: Positive for arthralgias (left shoulder). Decreased range of motion in left upper extremity   Psychiatric/Behavioral: Positive for sleep disturbance. /78 (Site: Left Upper Arm, Position: Sitting)   Pulse 69   Temp 98 °F (36.7 °C) (Temporal)   Resp 16   Wt 219 lb (99.3 kg)   SpO2 92%   BMI 33.30 kg/m²     Physical Exam  Vitals signs reviewed. Constitutional:       Appearance: He is well-developed. HENT:      Head: Normocephalic and atraumatic. Ears:      Comments: Patient is very hard of hearing  Eyes:      Pupils: Pupils are equal, round, and reactive to light. Neck:      Musculoskeletal: Normal range of motion. Cardiovascular:      Rate and Rhythm: Normal rate and regular rhythm. Heart sounds: Normal heart sounds. Pulmonary:      Effort: Pulmonary effort is normal.      Breath sounds: Normal breath sounds. Abdominal:      General: Bowel sounds are normal.      Tenderness: There is no abdominal tenderness. Musculoskeletal:         General: Tenderness (left anterior shoulder) present. Comments: Decreased left upper extremity range of motion   Neurological:      Mental Status: He is alert and oriented to person, place, and time. Psychiatric:         Behavior: Behavior normal.         Thought Content: Thought content normal.         Judgment: Judgment normal.         Plan   Diagnosis Orders   1. Hypothyroidism, unspecified type  TSH without Reflex    T4, Free    levothyroxine (SYNTHROID) 50 MCG tablet   2. Acute pain of left shoulder  Angela Flynn MD, Orthopedic Surgery, [facility]   3. Cold intolerance  Increased Synthroid to 50 mcg   4. At high risk for falls         Return in about 2 months (around 3/19/2021) for Synthroid and thyroid lab follow-up. Prior to Visit Medications    Medication Sig Taking?  Authorizing Provider   levothyroxine (SYNTHROID) 50 MCG tablet Take 1 tablet by mouth daily Yes Zohaibjim Lowe, DO   escitalopram (LEXAPRO) 10 MG tablet TAKE ONE TABLET BY MOUTH DAILY Yes Zohaibjim Lowe, DO   losartan-hydroCHLOROthiazide (HYZAAR) 100-12.5 MG per tablet TAKE ONE TABLET BY MOUTH DAILY Yes Zohaib Mynor, DO   metFORMIN (GLUCOPHAGE) 500 MG tablet Take 1 tablet by mouth daily (with breakfast)  Patient taking differently: Take 500 mg by mouth daily (with breakfast) Not every night Yes Zohaibjim Lowe, DO   carvedilol (COREG) 6.25 MG tablet TAKE ONE TABLET BY MOUTH TWICE A DAY WITH A MEAL  Patient taking differently: Not every night Yes Zohaibjim Lowe, DO   atorvastatin (LIPITOR) 80 MG tablet Take 1 tablet by mouth daily Yes Humberto Donnelly MD   aspirin 81 MG tablet Take by mouth Yes Historical Provider, MD omeprazole (PRILOSEC) 20 MG capsule Take 20 mg by mouth daily Yes Historical Provider, MD             On the basis of positive falls risk screening, assessment and plan is as follows: Patient denied dizziness or loss of consciousness before the fall. Will use caution in the future when walking on ice.

## 2021-01-22 DIAGNOSIS — R45.89 DEPRESSED MOOD: ICD-10-CM

## 2021-01-22 RX ORDER — ESCITALOPRAM OXALATE 10 MG/1
TABLET ORAL
Qty: 90 TABLET | Refills: 1 | Status: SHIPPED | OUTPATIENT
Start: 2021-01-22 | End: 2021-07-15

## 2021-03-22 ENCOUNTER — NURSE ONLY (OUTPATIENT)
Dept: FAMILY MEDICINE CLINIC | Age: 81
End: 2021-03-22

## 2021-03-22 VITALS — TEMPERATURE: 98.5 F

## 2021-03-22 DIAGNOSIS — E78.00 ELEVATED LDL CHOLESTEROL LEVEL: ICD-10-CM

## 2021-03-22 DIAGNOSIS — E03.9 HYPOTHYROIDISM, UNSPECIFIED TYPE: ICD-10-CM

## 2021-03-23 ENCOUNTER — OFFICE VISIT (OUTPATIENT)
Dept: FAMILY MEDICINE CLINIC | Age: 81
End: 2021-03-23
Payer: MEDICARE

## 2021-03-23 VITALS
HEART RATE: 67 BPM | TEMPERATURE: 98.4 F | WEIGHT: 231 LBS | SYSTOLIC BLOOD PRESSURE: 128 MMHG | OXYGEN SATURATION: 91 % | DIASTOLIC BLOOD PRESSURE: 70 MMHG | HEIGHT: 66 IN | BODY MASS INDEX: 37.12 KG/M2 | RESPIRATION RATE: 16 BRPM

## 2021-03-23 DIAGNOSIS — E78.00 ELEVATED LDL CHOLESTEROL LEVEL: ICD-10-CM

## 2021-03-23 DIAGNOSIS — I10 ESSENTIAL HYPERTENSION, BENIGN: ICD-10-CM

## 2021-03-23 DIAGNOSIS — H91.93 BILATERAL HEARING LOSS, UNSPECIFIED HEARING LOSS TYPE: ICD-10-CM

## 2021-03-23 DIAGNOSIS — Z12.5 PROSTATE CANCER SCREENING: ICD-10-CM

## 2021-03-23 DIAGNOSIS — E03.9 HYPOTHYROIDISM, UNSPECIFIED TYPE: Primary | ICD-10-CM

## 2021-03-23 DIAGNOSIS — E78.2 MIXED HYPERLIPIDEMIA: ICD-10-CM

## 2021-03-23 LAB
CHOLESTEROL, TOTAL: 210 MG/DL (ref 0–199)
HDLC SERPL-MCNC: 24 MG/DL (ref 40–60)
LDL CHOLESTEROL CALCULATED: ABNORMAL MG/DL
LDL CHOLESTEROL DIRECT: 130 MG/DL
T4 FREE: 0.9 NG/DL (ref 0.9–1.8)
TRIGL SERPL-MCNC: 352 MG/DL (ref 0–150)
TSH SERPL DL<=0.05 MIU/L-ACNC: 4.92 UIU/ML (ref 0.27–4.2)
VLDLC SERPL CALC-MCNC: ABNORMAL MG/DL

## 2021-03-23 PROCEDURE — 99214 OFFICE O/P EST MOD 30 MIN: CPT | Performed by: FAMILY MEDICINE

## 2021-03-23 RX ORDER — LEVOTHYROXINE SODIUM 0.07 MG/1
75 TABLET ORAL DAILY
Qty: 90 TABLET | Refills: 1 | Status: SHIPPED | OUTPATIENT
Start: 2021-03-23 | End: 2021-09-16

## 2021-03-23 ASSESSMENT — ENCOUNTER SYMPTOMS: SHORTNESS OF BREATH: 1

## 2021-03-23 NOTE — PROGRESS NOTES
3/23/2021    This is a 79 y.o. male   Chief Complaint   Patient presents with    Thyroid Problem     check    Hyperlipidemia     check   . HPI  Patient presents today for Synthroid and lab follow-up. Hypothyroidism: He is currently taking 50 mcg of Synthroid daily. Labs from yesterday show that his TSH had decreased to 4.92 from 6.93 and his free T4 has improved from 0.8 to 0.9. Fatigue has improved, denies feeling cold or hot, Admits to weight gain. Mixed Hyperlipidemia: Currently taking Lipitor 80 mg, His labs from yesterday showed a total cholesterol of 210, HDL, 24, , and triglycerides 352. Denies CP, admits to sob when going down and coming up stairs. Also admits to continued decreased hearing, wears bilateral hearing aids and saw hearing aid specialist yesterday,.        Past Medical History:   Diagnosis Date    CAD (coronary artery disease)     Diabetes ([facility] [address]     Stroke [facility])        Past Surgical History:   Procedure Laterality Date    CARDIAC SURGERY  09/10/2013    3 stents       Social History     Socioeconomic History    Marital status:      Spouse name: Not on file    Number of children: Not on file    Years of education: Not on file    Highest education level: Not on file   Occupational History    Not on file   Social Needs    Financial resource strain: Not on file    Food insecurity     Worry: Not on file     Inability: Not on file    Transportation needs     Medical: Not on file     Non-medical: Not on file   Tobacco Use    Smoking status: Former Smoker     Packs/day: 1.00     Years: 20.00     Pack years: 20.00     Types: Cigarettes     Quit date: 2002     Years since quittin.2    Smokeless tobacco: Never Used   Substance and Sexual Activity    Alcohol use: No    Drug use: No    Sexual activity: Never   Lifestyle    Physical activity     Days per week: Not on file     Minutes per session: Not on file    Stress: Not on file   Relationships  Social connections     Talks on phone: Not on file     Gets together: Not on file     Attends Denominational service: Not on file     Active member of club or organization: Not on file     Attends meetings of clubs or organizations: Not on file     Relationship status: Not on file    Intimate partner violence     Fear of current or ex partner: Not on file     Emotionally abused: Not on file     Physically abused: Not on file     Forced sexual activity: Not on file   Other Topics Concern    Not on file   Social History Narrative    Not on file       Family History   Problem Relation Age of Onset    Cancer Father     Heart Disease Father     Diabetes Father        Current Outpatient Medications   Medication Sig Dispense Refill    levothyroxine (SYNTHROID) 75 MCG tablet Take 1 tablet by mouth daily 90 tablet 1    escitalopram (LEXAPRO) 10 MG tablet TAKE ONE TABLET BY MOUTH DAILY 90 tablet 1    losartan-hydroCHLOROthiazide (HYZAAR) 100-12.5 MG per tablet TAKE ONE TABLET BY MOUTH DAILY 90 tablet 1    metFORMIN (GLUCOPHAGE) 500 MG tablet Take 1 tablet by mouth daily (with breakfast) (Patient taking differently: Take 500 mg by mouth daily (with breakfast) Not every night) 90 tablet 1    carvedilol (COREG) 6.25 MG tablet TAKE ONE TABLET BY MOUTH TWICE A DAY WITH A MEAL (Patient taking differently: Not every night) 180 tablet 1    atorvastatin (LIPITOR) 80 MG tablet Take 1 tablet by mouth daily 90 tablet 1    aspirin 81 MG tablet Take by mouth      omeprazole (PRILOSEC) 20 MG capsule Take 20 mg by mouth daily       No current facility-administered medications for this visit.         Immunization History   Administered Date(s) Administered    COVID-19, Pfizer, PF, 30mcg/0.3mL 03/05/2021    Influenza Vaccine, unspecified formulation 09/07/2016    Influenza, High Dose (Fluzone 65 yrs and older) 09/07/2016, 10/09/2017, 11/19/2018, 11/22/2019    Influenza, High-dose, Quadv, 65 yrs +, IM (Fluzone) 10/29/2020    Pneumococcal Conjugate 13-valent ([id number]) 10/09/2017    Pneumococcal Polysaccharide ([id number]) 09/29/2016    Tdap (Boostrix, Adacel) 09/10/2019       Allergies   Allergen Reactions    Penicillins Hives and Other (See Comments)       Nurse Only on 03/22/2021   Component Date Value Ref Range Status    Cholesterol, Total 03/22/2021 210* 0 - 199 mg/dL Final    Triglycerides 03/22/2021 352* 0 - 150 mg/dL Final    HDL 03/22/2021 24* 40 - 60 mg/dL Final    LDL Calculated 03/22/2021 see below  <100 mg/dL Final    Comment: When the triglyceride is <30 mg/dL or >300 mg/dL the  calculated LDL and  VLDL are not valid and a measured  LDL is performed.  VLDL Cholesterol Calculated 03/22/2021 see below  Not Established mg/dL Final    Comment: When the triglyceride is <30 mg/dL or >300 mg/dL the  calculated LDL and  VLDL are not valid and a measured  LDL is performed.  TSH 03/22/2021 4.92* 0.27 - 4.20 uIU/mL Final    T4 Free 03/22/2021 0.9  0.9 - 1.8 ng/dL Final    LDL Direct 03/22/2021 130* <100 mg/dL Final       Review of Systems   Constitutional: Positive for fatigue (improved ) and unexpected weight change. HENT:        Positive for decreased hearing   Respiratory: Positive for shortness of breath (with exertion). Cardiovascular: Negative for chest pain. Endocrine: Negative for cold intolerance and heat intolerance. /70 (Site: Left Upper Arm, Position: Sitting)   Pulse 67   Temp 98.4 °F (36.9 °C) (Temporal)   Resp 16   Ht 5' 6\" (1.676 m)   Wt 231 lb (104.8 kg)   SpO2 91%   BMI 37.28 kg/m²     Physical Exam  Vitals signs reviewed. Constitutional:       Appearance: He is well-developed. HENT:      Head: Normocephalic and atraumatic. Ears:      Comments: decreased hearing bilaterally  Eyes:      Pupils: Pupils are equal, round, and reactive to light. Neck:      Musculoskeletal: Normal range of motion.    Cardiovascular:      Rate and Rhythm: Normal rate and regular rhythm. Heart sounds: Normal heart sounds. No murmur. Pulmonary:      Effort: Pulmonary effort is normal.      Breath sounds: Normal breath sounds. No wheezing. Abdominal:      General: Bowel sounds are normal.      Tenderness: There is no abdominal tenderness. Neurological:      Mental Status: He is alert and oriented to person, place, and time. Psychiatric:         Behavior: Behavior normal.         Thought Content: Thought content normal.         Judgment: Judgment normal.         Plan   Diagnosis Orders   1. Hypothyroidism, unspecified type  levothyroxine (SYNTHROID) 75 MCG tablet    TSH without Reflex    T4, Free   2. Elevated LDL cholesterol level  Encouraged to exercise   3. Mixed hyperlipidemia  Lipid Panel   4. Prostate cancer screening  PSA screening   5. Essential hypertension, benign  CBC Auto Differential   6. Bilateral Hearing Loss                                              Will be meeting again with hearing aid staff. Return in about 2 months (around 5/23/2021) for AWV. Prior to Visit Medications    Medication Sig Taking?  Authorizing Provider   levothyroxine (SYNTHROID) 75 MCG tablet Take 1 tablet by mouth daily Yes Bakari Vaughan DO   escitalopram (LEXAPRO) 10 MG tablet TAKE ONE TABLET BY MOUTH DAILY Yes Bakari Vaughan DO   losartan-hydroCHLOROthiazide (HYZAAR) 100-12.5 MG per tablet TAKE ONE TABLET BY MOUTH DAILY Yes Bakari Vaughan DO   metFORMIN (GLUCOPHAGE) 500 MG tablet Take 1 tablet by mouth daily (with breakfast)  Patient taking differently: Take 500 mg by mouth daily (with breakfast) Not every night Yes Bakari Vaughan DO   carvedilol (COREG) 6.25 MG tablet TAKE ONE TABLET BY MOUTH TWICE A DAY WITH A MEAL  Patient taking differently: Not every night Yes Bakari Vaughan DO   atorvastatin (LIPITOR) 80 MG tablet Take 1 tablet by mouth daily Yes Kaylyn Valentino MD   aspirin 81 MG tablet Take by mouth Yes Historical Provider, MD   omeprazole ([address] [place] [address]) 20 MG capsule Take 20 mg by mouth daily Yes Historical Provider, MD

## 2021-06-22 ENCOUNTER — NURSE ONLY (OUTPATIENT)
Dept: FAMILY MEDICINE CLINIC | Age: 81
End: 2021-06-22

## 2021-06-22 DIAGNOSIS — E78.2 MIXED HYPERLIPIDEMIA: ICD-10-CM

## 2021-06-22 DIAGNOSIS — I10 ESSENTIAL HYPERTENSION, BENIGN: ICD-10-CM

## 2021-06-22 DIAGNOSIS — Z12.5 PROSTATE CANCER SCREENING: ICD-10-CM

## 2021-06-22 DIAGNOSIS — E03.9 HYPOTHYROIDISM, UNSPECIFIED TYPE: ICD-10-CM

## 2021-06-22 LAB
BASOPHILS ABSOLUTE: 0 K/UL (ref 0–0.2)
BASOPHILS RELATIVE PERCENT: 0.4 %
CHOLESTEROL, TOTAL: 230 MG/DL (ref 0–199)
EOSINOPHILS ABSOLUTE: 0.4 K/UL (ref 0–0.6)
EOSINOPHILS RELATIVE PERCENT: 5.5 %
HCT VFR BLD CALC: 49.5 % (ref 40.5–52.5)
HDLC SERPL-MCNC: 29 MG/DL (ref 40–60)
HEMOGLOBIN: 16.5 G/DL (ref 13.5–17.5)
LDL CHOLESTEROL CALCULATED: 153 MG/DL
LYMPHOCYTES ABSOLUTE: 1.6 K/UL (ref 1–5.1)
LYMPHOCYTES RELATIVE PERCENT: 21.6 %
MCH RBC QN AUTO: 29.4 PG (ref 26–34)
MCHC RBC AUTO-ENTMCNC: 33.3 G/DL (ref 31–36)
MCV RBC AUTO: 88.2 FL (ref 80–100)
MONOCYTES ABSOLUTE: 0.6 K/UL (ref 0–1.3)
MONOCYTES RELATIVE PERCENT: 7.6 %
NEUTROPHILS ABSOLUTE: 4.8 K/UL (ref 1.7–7.7)
NEUTROPHILS RELATIVE PERCENT: 64.9 %
PDW BLD-RTO: 15.5 % (ref 12.4–15.4)
PLATELET # BLD: 173 K/UL (ref 135–450)
PLATELET SLIDE REVIEW: ADEQUATE
PMV BLD AUTO: 10.3 FL (ref 5–10.5)
PROSTATE SPECIFIC ANTIGEN: 0.09 NG/ML (ref 0–4)
RBC # BLD: 5.61 M/UL (ref 4.2–5.9)
SLIDE REVIEW: ABNORMAL
T4 FREE: 1.1 NG/DL (ref 0.9–1.8)
TRIGL SERPL-MCNC: 238 MG/DL (ref 0–150)
TSH SERPL DL<=0.05 MIU/L-ACNC: 2.86 UIU/ML (ref 0.27–4.2)
VLDLC SERPL CALC-MCNC: 48 MG/DL
WBC # BLD: 7.3 K/UL (ref 4–11)

## 2021-06-29 ENCOUNTER — OFFICE VISIT (OUTPATIENT)
Dept: FAMILY MEDICINE CLINIC | Age: 81
End: 2021-06-29
Payer: MEDICARE

## 2021-06-29 VITALS
RESPIRATION RATE: 16 BRPM | BODY MASS INDEX: 36 KG/M2 | HEART RATE: 85 BPM | HEIGHT: 66 IN | TEMPERATURE: 98.1 F | WEIGHT: 224 LBS | SYSTOLIC BLOOD PRESSURE: 138 MMHG | DIASTOLIC BLOOD PRESSURE: 70 MMHG | OXYGEN SATURATION: 96 %

## 2021-06-29 DIAGNOSIS — R13.10 DYSPHAGIA, UNSPECIFIED TYPE: ICD-10-CM

## 2021-06-29 DIAGNOSIS — E11.9 TYPE 2 DIABETES MELLITUS WITHOUT COMPLICATION, WITHOUT LONG-TERM CURRENT USE OF INSULIN (HCC): ICD-10-CM

## 2021-06-29 DIAGNOSIS — Z00.00 ROUTINE GENERAL MEDICAL EXAMINATION AT A HEALTH CARE FACILITY: Primary | ICD-10-CM

## 2021-06-29 PROCEDURE — G0439 PPPS, SUBSEQ VISIT: HCPCS | Performed by: FAMILY MEDICINE

## 2021-06-29 RX ORDER — EZETIMIBE 10 MG/1
10 TABLET ORAL DAILY
Qty: 30 TABLET | Refills: 3 | Status: CANCELLED | OUTPATIENT
Start: 2021-06-29

## 2021-06-29 SDOH — ECONOMIC STABILITY: FOOD INSECURITY: WITHIN THE PAST 12 MONTHS, YOU WORRIED THAT YOUR FOOD WOULD RUN OUT BEFORE YOU GOT MONEY TO BUY MORE.: NEVER TRUE

## 2021-06-29 SDOH — ECONOMIC STABILITY: TRANSPORTATION INSECURITY
IN THE PAST 12 MONTHS, HAS LACK OF TRANSPORTATION KEPT YOU FROM MEETINGS, WORK, OR FROM GETTING THINGS NEEDED FOR DAILY LIVING?: NO

## 2021-06-29 SDOH — ECONOMIC STABILITY: INCOME INSECURITY: IN THE LAST 12 MONTHS, WAS THERE A TIME WHEN YOU WERE NOT ABLE TO PAY THE MORTGAGE OR RENT ON TIME?: NO

## 2021-06-29 SDOH — ECONOMIC STABILITY: TRANSPORTATION INSECURITY
IN THE PAST 12 MONTHS, HAS THE LACK OF TRANSPORTATION KEPT YOU FROM MEDICAL APPOINTMENTS OR FROM GETTING MEDICATIONS?: NO

## 2021-06-29 SDOH — ECONOMIC STABILITY: HOUSING INSECURITY
IN THE LAST 12 MONTHS, WAS THERE A TIME WHEN YOU DID NOT HAVE A STEADY PLACE TO SLEEP OR SLEPT IN A SHELTER (INCLUDING NOW)?: NO

## 2021-06-29 SDOH — ECONOMIC STABILITY: FOOD INSECURITY: WITHIN THE PAST 12 MONTHS, THE FOOD YOU BOUGHT JUST DIDN'T LAST AND YOU DIDN'T HAVE MONEY TO GET MORE.: NEVER TRUE

## 2021-06-29 ASSESSMENT — PATIENT HEALTH QUESTIONNAIRE - PHQ9
SUM OF ALL RESPONSES TO PHQ QUESTIONS 1-9: 0
1. LITTLE INTEREST OR PLEASURE IN DOING THINGS: 0
SUM OF ALL RESPONSES TO PHQ QUESTIONS 1-9: 0
SUM OF ALL RESPONSES TO PHQ9 QUESTIONS 1 & 2: 0
SUM OF ALL RESPONSES TO PHQ QUESTIONS 1-9: 0
2. FEELING DOWN, DEPRESSED OR HOPELESS: 0

## 2021-06-29 ASSESSMENT — SOCIAL DETERMINANTS OF HEALTH (SDOH): HOW HARD IS IT FOR YOU TO PAY FOR THE VERY BASICS LIKE FOOD, HOUSING, MEDICAL CARE, AND HEATING?: NOT HARD AT ALL

## 2021-06-29 NOTE — PATIENT INSTRUCTIONS
Personalized Preventive Plan for [facility] - 6/29/2021  Medicare offers a range of preventive health benefits. Some of the tests and screenings are paid in full while other may be subject to a deductible, co-insurance, and/or copay. Some of these benefits include a comprehensive review of your medical history including lifestyle, illnesses that may run in your family, and various assessments and screenings as appropriate. After reviewing your medical record and screening and assessments performed today your provider may have ordered immunizations, labs, imaging, and/or referrals for you. A list of these orders (if applicable) as well as your Preventive Care list are included within your After Visit Summary for your review. Other Preventive Recommendations:    · A preventive eye exam performed by an eye specialist is recommended every 1-2 years to screen for glaucoma; cataracts, macular degeneration, and other eye disorders. · A preventive dental visit is recommended every 6 months. · Try to get at least 150 minutes of exercise per week or 10,000 steps per day on a pedometer . · Order or download the FREE \"Exercise & Physical Activity: Your Everyday Guide\" from [employer]. Call 4-697.905.8292 or search [employer] online. · You need 8794-1835 mg of calcium and 6595-8525 IU of vitamin D per day. It is possible to meet your calcium requirement with diet alone, but a vitamin D supplement is usually necessary to meet this goal.  · When exposed to the sun, use a sunscreen that protects against both UVA and UVB radiation with an SPF of 30 or greater. Reapply every 2 to 3 hours or after sweating, drying off with a towel, or swimming. · Always wear a seat belt when traveling in a car. Always wear a helmet when riding a bicycle or motorcycle.

## 2021-06-29 NOTE — PROGRESS NOTES
Medicare Annual Wellness Visit  Name: Es Groves Date: 2021   MRN: <[id number]> Sex: Male   Age: 79 y.o. Ethnicity: Non-/Non    : 1940 Race: Anthony Grimes is here for Medicare AWV    Screenings for behavioral, psychosocial and functional/safety risks, and cognitive dysfunction are all negative except as indicated below. These results, as well as other patient data from the [address] form, are documented in Flowsheets linked to this Encounter. Labs from 2021 reviewed at today's visit. Total cholesterol 230, HDL 29, , triglycerides 238, TSH and T4 within normal limits, CBC within normal limits, PSA 0.09. Has not been taking his Lipitor everyday    Allergies   Allergen Reactions    Penicillins Hives and Other (See Comments)       Prior to Visit Medications    Medication Sig Taking?  Authorizing Provider   losartan-hydroCHLOROthiazide (HYZAAR) 100-12.5 MG per tablet TAKE ONE TABLET BY MOUTH DAILY Yes Leena Vazquez, DO   levothyroxine (SYNTHROID) 75 MCG tablet Take 1 tablet by mouth daily Yes Leena Vazquez, DO   escitalopram (LEXAPRO) 10 MG tablet TAKE ONE TABLET BY MOUTH DAILY Yes Leena Vazquez, DO   metFORMIN (GLUCOPHAGE) 500 MG tablet Take 1 tablet by mouth daily (with breakfast)  Patient taking differently: Take 500 mg by mouth daily (with breakfast) Not every night Yes Leena Vazquez, DO   carvedilol (COREG) 6.25 MG tablet TAKE ONE TABLET BY MOUTH TWICE A DAY WITH A MEAL  Patient taking differently: Not every night Yes Leena Vazquez, DO   atorvastatin (LIPITOR) 80 MG tablet Take 1 tablet by mouth daily Yes Brandi Swann MD   aspirin 81 MG tablet Take by mouth Yes Historical Provider, MD   omeprazole (PRILOSEC) 20 MG capsule Take 20 mg by mouth daily Yes Historical Provider, MD       Past Medical History:   Diagnosis Date    CAD (coronary artery disease)     Diabetes ([facility] [address]     Stroke [facility])        Past Surgical History:   Procedure Laterality Date    CARDIAC SURGERY  09/10/2013    3 stents       Family History   Problem Relation Age of Onset    Cancer Father     Heart Disease Father     Diabetes Father        CareTeam (Including outside providers/suppliers regularly involved in providing care):   Patient Care Team:  Nancy Rubio DO as PCP - General (Family Medicine)  Nancy Rubio DO as PCP - [facility]aneled Provider  Flora Juan MD (Cardiology)    Wt Readings from Last 3 Encounters:   06/29/21 224 lb (101.6 kg)   03/23/21 231 lb (104.8 kg)   01/19/21 219 lb (99.3 kg)     Vitals:    06/29/21 0758   Resp: 16   Temp: 98.1 °F (36.7 °C)   TempSrc: Temporal   Weight: 224 lb (101.6 kg)   Height: 5' 5.5\" (1.664 m)     Body mass index is 36.71 kg/m². Based upon direct observation of the patient, evaluation of cognition reveals recent and remote memory intact. Patient's complete Health Risk Assessment and screening values have been reviewed and are found in Flowsheets. The following problems were reviewed today and where indicated follow up appointments were made and/or referrals ordered. Positive Risk Factor Screenings with Interventions:          General Health and ACP:     Advance Directives     Power of  Living Will ACP-Advance Directive ACP-Power of     Not on File Not on File Not on File Not on File      General Health Risk Interventions:  · Hearing Loss: Has hearing aids but is trying to get a new pair but having difficulty having them covered.     Health Habits/Nutrition:     Body mass index: (!) 36.7  Health Habits/Nutrition Interventions:  · Dental exam overdue:  patient encouraged to make appointment with his/her dentist       Personalized Preventive Plan   Current Health Maintenance Status  Immunization History   Administered Date(s) Administered    COVID-19, Pfizer, PF, 30mcg/0.3mL 03/05/2021    Influenza Vaccine, unspecified formulation 09/07/2016    Influenza, High Dose (Fluzone 65 yrs and older) 09/07/2016, 10/09/2017, 11/19/2018, 11/22/2019    Influenza, High-dose, Quadv, 65 yrs +, IM (Fluzone) 10/29/2020    Pneumococcal Conjugate 13-valent ([id number]) 10/09/2017    Pneumococcal Polysaccharide ([id number]) 09/29/2016    Tdap (Boostrix, Adacel) 09/10/2019        Health Maintenance   Topic Date Due    Shingles Vaccine (1 of 2) Never done   [employer] Visit (AWV)  Never done    Potassium monitoring  08/05/2021    Creatinine monitoring  08/05/2021    Lipid screen  06/22/2022    DTaP/Tdap/Td vaccine (2 - Td or Tdap) 09/10/2029    Flu vaccine  Completed    Pneumococcal 65+ years Vaccine  Completed    COVID-19 Vaccine  Completed    Hepatitis A vaccine  Aged Out    Hib vaccine  Aged Out    Meningococcal (ACWY) vaccine  Aged Out     Recommendations for [employer] Due: see orders and patient instructions/AVS.  . Recommended screening schedule for the next 5-10 years is provided to the patient in written form: see Patient Instructions/AVS.    Jerry Garcia was seen today for medicare awv.     Diagnoses and all orders for this visit:    Routine general medical examination at a health care facility

## 2021-07-06 ENCOUNTER — OFFICE VISIT (OUTPATIENT)
Dept: ENT CLINIC | Age: 81
End: 2021-07-06
Payer: MEDICARE

## 2021-07-06 VITALS
TEMPERATURE: 97.5 F | WEIGHT: 224 LBS | HEART RATE: 56 BPM | HEIGHT: 66 IN | SYSTOLIC BLOOD PRESSURE: 146 MMHG | BODY MASS INDEX: 36 KG/M2 | DIASTOLIC BLOOD PRESSURE: 75 MMHG

## 2021-07-06 DIAGNOSIS — R13.14 PHARYNGOESOPHAGEAL DYSPHAGIA: Primary | ICD-10-CM

## 2021-07-06 DIAGNOSIS — K21.9 LARYNGOPHARYNGEAL REFLUX (LPR): ICD-10-CM

## 2021-07-06 PROCEDURE — 31575 DIAGNOSTIC LARYNGOSCOPY: CPT | Performed by: OTOLARYNGOLOGY

## 2021-07-06 PROCEDURE — 99203 OFFICE O/P NEW LOW 30 MIN: CPT | Performed by: OTOLARYNGOLOGY

## 2021-07-06 RX ORDER — PANTOPRAZOLE SODIUM 40 MG/1
40 TABLET, DELAYED RELEASE ORAL
Qty: 30 TABLET | Refills: 3 | Status: SHIPPED | OUTPATIENT
Start: 2021-07-06

## 2021-07-06 ASSESSMENT — ENCOUNTER SYMPTOMS
NAUSEA: 0
VOICE CHANGE: 0
BLOOD IN STOOL: 0
COLOR CHANGE: 0
TROUBLE SWALLOWING: 1
BACK PAIN: 0
PHOTOPHOBIA: 0
COUGH: 0
EYE ITCHING: 0
RHINORRHEA: 0
EYE DISCHARGE: 0
SINUS PAIN: 0
STRIDOR: 0
SORE THROAT: 0
SINUS PRESSURE: 0
WHEEZING: 0
DIARRHEA: 0
VOMITING: 0
CHOKING: 0
FACIAL SWELLING: 0
CONSTIPATION: 0
SHORTNESS OF BREATH: 0

## 2021-07-06 NOTE — PROGRESS NOTES
[facility]  [address] [address] [facility], [address] [place] [address]  P: [phone]  F: [phone]       Patient     Tarsha Shepard  1940    ChiefComplaint     Chief Complaint   Patient presents with    New Patient     Patient is here today because he is having difficulty swallowing and painful    Ear Problem     Patient is very hard of hearing he dows wear heaing aid but would like his ears check to see if his ears are impacted with wax       History of Present Illness     Tarsha Shepard is a pleasant 79 y.o. male who presents as a new patient today for dysphagia. He has been experiencing worsening dysphagia gradually over the past 2 years or so. Typically is with solid foods. They get stuck in his throat. He is able to clear them out sometimes. This can cause some discomfort that lasts a day or so. Denies regurgitation of undigested food. Denies dysphagia with liquids. Denies fevers, chills, night sweats or unexpected weight loss. Denies change in voice or otalgia. Denies cough. He does have a remote history of tobacco use. Prior 20-pack-year smoking history. Quit in 1989. Never had a swallow study.     Past Medical History     Past Medical History:   Diagnosis Date    CAD (coronary artery disease)     Diabetes ([facility] [address]     Stroke [facility])        Past Surgical History     Past Surgical History:   Procedure Laterality Date    CARDIAC SURGERY  09/10/2013    3 stents       Family History     Family History   Problem Relation Age of Onset    Cancer Father     Heart Disease Father     Diabetes Father        Social History     Social History     Socioeconomic History    Marital status:      Spouse name: Not on file    Number of children: Not on file    Years of education: Not on file    Highest education level: Not on file   Occupational History    Not on file   Tobacco Use    Smoking status: Former Smoker     Packs/day: 1.00     Years: 20.00     Pack years: 20.00 Types: Cigarettes     Quit date: 2002     Years since quittin.5    Smokeless tobacco: Never Used   Vaping Use    Vaping Use: Never used   Substance and Sexual Activity    Alcohol use: No    Drug use: No    Sexual activity: Never   Other Topics Concern    Not on file   Social History Narrative    Not on file     Social Determinants of Health     Financial Resource Strain: Low Risk     Difficulty of Paying Living Expenses: Not hard at all   Food Insecurity: No Food Insecurity    Worried About Running Out of Food in the Last Year: Never true    J Luis of Food in the Last Year: Never true   Transportation Needs: No Transportation Needs    Lack of Transportation (Medical): No    Lack of Transportation (Non-Medical): No   Physical Activity:     Days of Exercise per Week:     Minutes of Exercise per Session:    Stress:     Feeling of Stress :    Social Connections:     Frequency of Communication with Friends and Family:     Frequency of Social Gatherings with Friends and Family:     Attends Synagogue Services:     Active Member of Clubs or Organizations:     Attends Club or Organization Meetings:     Marital Status:    Intimate Partner Violence:     Fear of Current or Ex-Partner:     Emotionally Abused:     Physically Abused:     Sexually Abused:         Allergies     Allergies   Allergen Reactions    Penicillins Hives and Other (See Comments)       Medications     Current Outpatient Medications   Medication Sig Dispense Refill    pantoprazole (PROTONIX) 40 MG tablet Take 1 tablet by mouth Daily with supper 30 tablet 3    losartan-hydroCHLOROthiazide (HYZAAR) 100-12.5 MG per tablet TAKE ONE TABLET BY MOUTH DAILY 90 tablet 1    levothyroxine (SYNTHROID) 75 MCG tablet Take 1 tablet by mouth daily 90 tablet 1    escitalopram (LEXAPRO) 10 MG tablet TAKE ONE TABLET BY MOUTH DAILY 90 tablet 1    metFORMIN (GLUCOPHAGE) 500 MG tablet Take 1 tablet by mouth daily (with breakfast) (Patient taking differently: Take 500 mg by mouth daily (with breakfast) Not every night) 90 tablet 1    carvedilol (COREG) 6.25 MG tablet TAKE ONE TABLET BY MOUTH TWICE A DAY WITH A MEAL (Patient taking differently: Not every night) 180 tablet 1    atorvastatin (LIPITOR) 80 MG tablet Take 1 tablet by mouth daily 90 tablet 1    aspirin 81 MG tablet Take by mouth       No current facility-administered medications for this visit. Review of Systems     Review of Systems   Constitutional: Negative for activity change, appetite change, chills, diaphoresis, fatigue, fever and unexpected weight change. HENT: Positive for trouble swallowing. Negative for congestion, dental problem, drooling, ear discharge, ear pain, facial swelling, hearing loss, mouth sores, nosebleeds, postnasal drip, rhinorrhea, sinus pressure, sinus pain, sneezing, sore throat, tinnitus and voice change. Eyes: Negative for photophobia, discharge, itching and visual disturbance. Respiratory: Negative for cough, choking, shortness of breath, wheezing and stridor. Gastrointestinal: Negative for blood in stool, constipation, diarrhea, nausea and vomiting. Endocrine: Negative for cold intolerance, heat intolerance, polyphagia and polyuria. Musculoskeletal: Negative for back pain, gait problem, neck pain and neck stiffness. Skin: Negative for color change, pallor, rash and wound. Neurological: Negative for dizziness, syncope, facial asymmetry, speech difficulty, light-headedness, numbness and headaches. Hematological: Negative for adenopathy. Does not bruise/bleed easily. Psychiatric/Behavioral: Negative for agitation, confusion and sleep disturbance. PhysicalExam     Vitals:    07/06/21 1139   BP: (!) 146/75   Pulse: 56   Temp: 97.5 °F (36.4 °C)       Physical Exam  Constitutional:       Appearance: He is well-developed. HENT:      Head: Normocephalic and atraumatic. Not macrocephalic and not microcephalic.  No raccoon eyes, Duarte's sign, abrasion, contusion, right periorbital erythema, left periorbital erythema or laceration. Hair is normal.      Jaw: No trismus. Right Ear: Hearing, tympanic membrane and external ear normal. No decreased hearing noted. No drainage, swelling or tenderness. No middle ear effusion. No mastoid tenderness. Tympanic membrane is not perforated, retracted or bulging. Tympanic membrane has normal mobility. Left Ear: Hearing, tympanic membrane and external ear normal. No decreased hearing noted. No drainage, swelling or tenderness. No middle ear effusion. No mastoid tenderness. Tympanic membrane is not perforated, retracted or bulging. Tympanic membrane has normal mobility. Nose: No nasal deformity, septal deviation, laceration, mucosal edema or rhinorrhea. Right Sinus: No maxillary sinus tenderness or frontal sinus tenderness. Left Sinus: No maxillary sinus tenderness or frontal sinus tenderness. Mouth/Throat:      Mouth: Mucous membranes are not pale, not dry and not cyanotic. No lacerations or oral lesions. Dentition: Has dentures. No dental caries or dental abscesses. Pharynx: Uvula midline. No oropharyngeal exudate, posterior oropharyngeal erythema or uvula swelling. Tonsils: No tonsillar abscesses. Eyes:      General: Lids are normal.         Right eye: No discharge. Left eye: No discharge. Extraocular Movements:      Right eye: Normal extraocular motion and no nystagmus. Left eye: Normal extraocular motion and no nystagmus. Conjunctiva/sclera:      Right eye: No chemosis or exudate. Left eye: No chemosis or exudate. Neck:      Thyroid: No thyroid mass or thyromegaly. Vascular: Normal carotid pulses. Trachea: No tracheal tenderness or tracheal deviation. Cardiovascular:      Rate and Rhythm: Normal rate and regular rhythm. Pulmonary:      Effort: No tachypnea, bradypnea or respiratory distress. Breath sounds:  No stridor. Musculoskeletal:      Right shoulder: Normal range of motion. Cervical back: Neck supple. Lymphadenopathy:      Head:      Right side of head: No submental, submandibular, tonsillar, preauricular, posterior auricular or occipital adenopathy. Left side of head: No submental, submandibular, tonsillar, preauricular, posterior auricular or occipital adenopathy. Cervical: No cervical adenopathy. Right cervical: No superficial, deep or posterior cervical adenopathy. Left cervical: No superficial, deep or posterior cervical adenopathy. Skin:     General: Skin is warm and dry. Findings: No bruising, erythema, laceration, lesion or rash. Neurological:      Mental Status: He is alert and oriented to person, place, and time. Cranial Nerves: No cranial nerve deficit. Psychiatric:         Speech: Speech normal.         Behavior: Behavior normal.           Procedure     Flexible Laryngoscopy    Pre op: Dysphagia. Procedure : Flexible Laryngoscopy  Surgeon: Katherin Howe DO  Anesthesia: Afrin with 4% lidocaine  Indication: Laryngeal mirror examination was not tolerated due to gag reflex  Description:  The scope was passed along the floor of the right naris to the level of the larynx. There was no evidence of concerning masses or lesions of the base of tongue, vallecula, epiglottis, aryepiglottic folds, arytenoids, false vocal folds, true vocal folds, or pyriform sinuses. True vocal folds exhibited symmetric motion bilaterally without evidence of paralysis or paresis. The scope was removed. The patient tolerated the procedure without difficulty. There were no complications. Pertinent findings: Moderate postcricoid erythema and erythema           Assessment and Plan     1. Pharyngoesophageal dysphagia  Flexible laryngoscopy reveals no concerning findings for mass or tumor. There is some moderate postcricoid edema and erythema.   Patient does have a history of gastroesophageal reflux and takes over-the-counter Prilosec. I do suspect he is experiencing LPR symptoms causing the swelling and solid food dysphagia. We will discontinue Prilosec and begin Protonix 40 mg before dinner. Additionally will have patient see speech therapy and undergo swallow study to rule out any esophageal stricture or web or growth. Follow-up 2 months. - [place] ESOPHAGRAM; Future  - [facility] Walt Santana, SLP - Speech Therapy - [facility]    2. Laryngopharyngeal reflux (LPR)    - pantoprazole (PROTONIX) 40 MG tablet; Take 1 tablet by mouth Daily with supper  Dispense: 30 tablet; Refill: 3      Return in about 2 months (around 9/6/2021). Portions of this note were dictated using Dragon.  There may be linguistic errors secondary to the use of this program.

## 2021-07-14 DIAGNOSIS — R45.89 DEPRESSED MOOD: ICD-10-CM

## 2021-07-14 NOTE — TELEPHONE ENCOUNTER
Future Appointments   Date Time Provider Shon Berrios   7/26/2021  2:00 PM Alba Mcnamara, SLP [facility] SLP [facility]   9/7/2021 11:30 AM DO MERVAT Segal     LOV 6/29/21

## 2021-07-15 RX ORDER — ESCITALOPRAM OXALATE 10 MG/1
TABLET ORAL
Qty: 90 TABLET | Refills: 0 | Status: SHIPPED | OUTPATIENT
Start: 2021-07-15 | End: 2021-10-13 | Stop reason: SDUPTHER

## 2021-07-26 ENCOUNTER — PROCEDURE VISIT (OUTPATIENT)
Dept: SPEECH THERAPY | Age: 81
End: 2021-07-26

## 2021-07-26 DIAGNOSIS — R13.10 DYSPHAGIA, UNSPECIFIED TYPE: Primary | ICD-10-CM

## 2021-07-26 NOTE — PROGRESS NOTES
Pt arrived for appt; upon arrival to room, demanded this SLP remove mask to assist w/ communication. When SLP attempted to clarify rules/protocols, pt became upset and abruptly left room, stating \"I'm not doing this if you aren't taking off your mask\". Pt requested refund of co-pay, which was provided. Will f/u w/ ENT.     Thank you,    Kay Matthews) [facility], [address] [place] [address], [address] [place] [address]; [phone]  Speech-Language Pathologist

## 2021-08-16 NOTE — TELEPHONE ENCOUNTER
Patient called to check on refill request on carvedilol (COREG) 6.25 MG tablet      states he asked pharmacy for it a week ago .

## 2021-08-17 RX ORDER — CARVEDILOL 6.25 MG/1
TABLET ORAL
Qty: 180 TABLET | Refills: 1 | Status: SHIPPED | OUTPATIENT
Start: 2021-08-17 | End: 2022-08-18

## 2021-09-16 DIAGNOSIS — E03.9 HYPOTHYROIDISM, UNSPECIFIED TYPE: ICD-10-CM

## 2021-09-16 RX ORDER — LEVOTHYROXINE SODIUM 0.07 MG/1
TABLET ORAL
Qty: 90 TABLET | Refills: 1 | Status: SHIPPED | OUTPATIENT
Start: 2021-09-16 | End: 2022-03-14

## 2021-10-05 RX ORDER — LOSARTAN POTASSIUM AND HYDROCHLOROTHIAZIDE 12.5; 1 MG/1; MG/1
TABLET ORAL
Qty: 90 TABLET | Refills: 1 | Status: SHIPPED | OUTPATIENT
Start: 2021-10-05 | End: 2022-03-30

## 2021-10-13 ENCOUNTER — TELEPHONE (OUTPATIENT)
Dept: FAMILY MEDICINE CLINIC | Age: 81
End: 2021-10-13

## 2021-10-13 DIAGNOSIS — R45.89 DEPRESSED MOOD: ICD-10-CM

## 2021-10-13 RX ORDER — ESCITALOPRAM OXALATE 10 MG/1
TABLET ORAL
Qty: 90 TABLET | Refills: 0 | Status: SHIPPED | OUTPATIENT
Start: 2021-10-13 | End: 2022-01-07

## 2021-10-13 NOTE — TELEPHONE ENCOUNTER
Please let pt know that on 06/29/21 I placed the following referral for Dysphagia (difficulty swallowing):    [address] Mercado, and [address] [facility], DO   [address] [place] [address], [address]   Ph: [phone]    Is this what he is referring to? Thank you.

## 2021-10-15 ENCOUNTER — TELEPHONE (OUTPATIENT)
Dept: FAMILY MEDICINE CLINIC | Age: 81
End: 2021-10-15

## 2021-10-15 NOTE — TELEPHONE ENCOUNTER
----- Message from [facility] (MICHELLE SOSA) sent at 10/15/2021 10:36 AM EDT -----  Subject: Message to Provider    QUESTIONS  Information for Provider? pt is calling stating he do not want to go back   to the referral for ENT he would like another doctor the pt states she   would not remove mask so he can understand what she was saying.  ---------------------------------------------------------------------------  --------------  [address]  What is the best way for the office to contact you? OK to leave message on   voicemail  Preferred Call Back Phone Number? [phone]  ---------------------------------------------------------------------------  --------------  SCRIPT ANSWERS  Relationship to Patient?  Self

## 2021-10-15 NOTE — TELEPHONE ENCOUNTER
Unfortunately, I do no have other referral options I am aware of for speech pathology. Please have pt reach out to Dr. Lito Leong office (ENT who placed referral) for other possible providers. Thank you.

## 2021-10-31 DIAGNOSIS — K21.9 LARYNGOPHARYNGEAL REFLUX (LPR): ICD-10-CM

## 2021-10-31 RX ORDER — PANTOPRAZOLE SODIUM 40 MG/1
TABLET, DELAYED RELEASE ORAL
Qty: 30 TABLET | Refills: 3 | OUTPATIENT
Start: 2021-10-31

## 2021-11-07 DIAGNOSIS — E78.00 PURE HYPERCHOLESTEROLEMIA: ICD-10-CM

## 2021-11-07 RX ORDER — ATORVASTATIN CALCIUM 80 MG/1
80 TABLET, FILM COATED ORAL DAILY
Qty: 90 TABLET | Refills: 1 | Status: SHIPPED | OUTPATIENT
Start: 2021-11-07

## 2021-12-03 ENCOUNTER — TELEPHONE (OUTPATIENT)
Dept: FAMILY MEDICINE CLINIC | Age: 81
End: 2021-12-03

## 2021-12-03 DIAGNOSIS — R13.10 DYSPHAGIA, UNSPECIFIED TYPE: Primary | ICD-10-CM

## 2021-12-03 NOTE — TELEPHONE ENCOUNTER
Patient states he dropped off letter regarding being dismissed from place he was referred to for ENT because the provider refused to remove mask to accommodate patient so he could read lips since he can not hear so he walked out .  The patient needs new referral to ENT for his throat states it getting very difficult to swallow

## 2021-12-03 NOTE — TELEPHONE ENCOUNTER
Please let pt know that I have placed a referral for ENT, Dr. Cyndee Riley, but that he is most likely going to need to wear a mask wherever he goes. Thank you.

## 2021-12-06 ENCOUNTER — OFFICE VISIT (OUTPATIENT)
Dept: ENT CLINIC | Age: 81
End: 2021-12-06
Payer: MEDICARE

## 2021-12-06 ENCOUNTER — TELEPHONE (OUTPATIENT)
Dept: ENT CLINIC | Age: 81
End: 2021-12-06

## 2021-12-06 VITALS
HEIGHT: 66 IN | WEIGHT: 224 LBS | DIASTOLIC BLOOD PRESSURE: 88 MMHG | SYSTOLIC BLOOD PRESSURE: 152 MMHG | BODY MASS INDEX: 36 KG/M2 | TEMPERATURE: 97.4 F

## 2021-12-06 DIAGNOSIS — H91.93 BILATERAL HEARING LOSS, UNSPECIFIED HEARING LOSS TYPE: ICD-10-CM

## 2021-12-06 DIAGNOSIS — R13.12 OROPHARYNGEAL DYSPHAGIA: Primary | ICD-10-CM

## 2021-12-06 PROCEDURE — 99213 OFFICE O/P EST LOW 20 MIN: CPT | Performed by: OTOLARYNGOLOGY

## 2021-12-06 NOTE — PROGRESS NOTES
[address] [facility] FOLLOW UP NOTE      Patient Name: Rola Walker Record Number:  [id number]  Primary Care Physician:  Barbara Gonzalez DO    ChiefComplaint     Chief Complaint   Patient presents with    New Patient     Patient is having difficulty swallowing for a year now. Had a bad episode at [facility]       History of Present Illness     Dipika Erickson is an 79 y.o. male with dysphagia-previously seen by my colleague Dr Jennie Vicente, 07/2021 with concern for progressive dysphagia with solid foods. Flexible laryngoscopy with postcricoid erythema and edema; was started on Protonix 40 mg daily and referred to speech-language pathology however he did not complete procedure as he refused to keep his mask on. Christiane Davis Has history of GERD, HLD, CAD with stent placement 2013. Patient states he has difficulty swallowing hard foods such as meats, no difficulty with putting/liquid consistencies. Will cough/choke with food with immediate regurgitation-denies any regurgitation of foods every 2-3 hours. No voice changes, no throat pain, no hemoptysis. Has history of GERD in chart however denies any waterbrash/chest burning. Denies tobacco use (quit more than 10 years ago) denies alcohol use. Of interest, patient states significantly poor hearing-complete loss of hearing in the left ear, 80-90% the right ear. Wears behind the ear hearing aids (seen at [facility]) however cannot talk on the phone, must read lips even with hearing aids in place. Last 2 in 3 months ago.     Past Medical History     Past Medical History:   Diagnosis Date    CAD (coronary artery disease)     Diabetes ([address]     Stroke [facility])        Past Surgical History     Past Surgical History:   Procedure Laterality Date    CARDIAC SURGERY  09/10/2013    3 stents       Family History     Family History   Problem Relation Age of Onset    Cancer Father     Heart Disease Father     Diabetes Father        Social History     Social History     Tobacco Use    Smoking status: Former Smoker     Packs/day: 1.00     Years: 20.00     Pack years: 20.00     Types: Cigarettes     Quit date: 2002     Years since quittin.9    Smokeless tobacco: Never Used   Vaping Use    Vaping Use: Never used   Substance Use Topics    Alcohol use: No    Drug use: No        Allergies     Allergies   Allergen Reactions    Penicillins Hives and Other (See Comments)       Medications     Current Outpatient Medications   Medication Sig Dispense Refill    atorvastatin (LIPITOR) 80 MG tablet Take 1 tablet by mouth daily 90 tablet 1    escitalopram (LEXAPRO) 10 MG tablet TAKE ONE TABLET BY MOUTH DAILY 90 tablet 0    losartan-hydroCHLOROthiazide (HYZAAR) 100-12.5 MG per tablet TAKE ONE TABLET BY MOUTH DAILY 90 tablet 1    levothyroxine (SYNTHROID) 75 MCG tablet TAKE ONE TABLET BY MOUTH DAILY 90 tablet 1    carvedilol (COREG) 6.25 MG tablet TAKE ONE TABLET BY MOUTH TWICE A DAY WITH A MEAL 180 tablet 1    pantoprazole (PROTONIX) 40 MG tablet Take 1 tablet by mouth Daily with supper 30 tablet 3    metFORMIN (GLUCOPHAGE) 500 MG tablet Take 1 tablet by mouth daily (with breakfast) (Patient taking differently: Take 500 mg by mouth daily (with breakfast) Not every night) 90 tablet 1    aspirin 81 MG tablet Take by mouth       No current facility-administered medications for this visit.        Review of Systems     REVIEW OF SYSTEMS  The following systems were reviewed and revealed the following in addition to any already discussed in the HPI:    CONSTITUTIONAL: No weight loss, no fever, no night sweats, no chills  EYES: no vision changes, no blurry vision  EARS: + Changes in hearing, no otalgia  NOSE: no epistaxis, no rhinorrhea  RESPIRATORY: No difficulty breathing, no shortness of breath  CV: no chest pain, no peripheral vascular disease  HEME: No coagulation disorder, no bleeding disorder  NEURO: No TIA or stroke-like schedule an appointment with him and see him back in 1 month for discussion of results. - [place] MODIFIED BARIUM SWALLOW W VIDEO; Future  - [place] ESOPHAGRAM; Future    2. Bilateral hearing loss, unspecified hearing loss type  Patient with bilateral hearing loss, even with behind the ear hearing aids cannot talk on the phone. Have asked him to sign consent for us to obtain [facility] results from [facility], may be a cochlear implant candidate-he is in favor of proceeding with this, and we will see him back in 1 month with hearing test.    Return in about 4 weeks (around 1/3/2022). Kathy Alonzo MD  [facility]  Department of Otolaryngology/Head and Neck Surgery  12/6/21    I have performed a head and neck physical exam personally or was physically present during the key or critical portions of the service. Medical Decision Making: The following items were considered in medical decision making:  Independent review of images  Review / order clinical lab tests  Review / order radiology tests  Decision to obtain old records  Review and summation of old records as accessed through [facility] (a summary of my findings in these old records: none)     Portions of this note were dictated using Dragon.  There may be linguistic errors secondary to the use of this program.

## 2021-12-06 NOTE — TELEPHONE ENCOUNTER
Dr. Anne Duran ordered an esophagram and a modified barium swallow for the patient. Call was placed to schedule the appointments for the patient. He is scheduled for the esophagram on 12/14/21. Patient is to arrive at registration here at [facility] at 9 AM. Written instruction were provided to the patient. He is to be NPO after midnight. He may brush his teeth and take his morning medications.  The modified barium swallow will be at 11 am on that same day

## 2021-12-14 ENCOUNTER — HOSPITAL ENCOUNTER (OUTPATIENT)
Dept: GENERAL RADIOLOGY | Age: 81
Discharge: HOME | End: 2021-12-14
Payer: MEDICARE

## 2021-12-14 DIAGNOSIS — R13.12 OROPHARYNGEAL DYSPHAGIA: ICD-10-CM

## 2021-12-14 PROCEDURE — 74220 X-RAY XM ESOPHAGUS 1CNTRST: CPT

## 2021-12-16 ENCOUNTER — TELEPHONE (OUTPATIENT)
Dept: ENT CLINIC | Age: 81
End: 2021-12-16

## 2021-12-16 DIAGNOSIS — R13.19 ESOPHAGEAL DYSPHAGIA: Primary | ICD-10-CM

## 2021-12-16 NOTE — TELEPHONE ENCOUNTER
Betito, I agree he will benefit from an EGD with possible stricture dilation. I will have my staff reach out to set up an office visit next week and plan for the EGD shortly thereafter.  Thanks

## 2021-12-16 NOTE — TELEPHONE ENCOUNTER
Call patient regarding esophagram-has esophageal dysmotility, no aspiration but/penetration with liquids. Small hiatal hernia noted, small area of narrowing in the proximal esophagus-? Focal stricture. We will refer patient to gastroenterology for consideration of upper GI endoscopy or further therapy. He is in agreement with this plan he will see him back in a 1/2022 with audiogram as he may be a candidate for cochlear implantation.

## 2022-03-12 DIAGNOSIS — E03.9 HYPOTHYROIDISM, UNSPECIFIED TYPE: ICD-10-CM

## 2022-03-14 RX ORDER — LEVOTHYROXINE SODIUM 0.07 MG/1
TABLET ORAL
Qty: 90 TABLET | Refills: 1 | Status: SHIPPED | OUTPATIENT
Start: 2022-03-14 | End: 2022-09-19 | Stop reason: SDUPTHER

## 2022-03-30 RX ORDER — LOSARTAN POTASSIUM AND HYDROCHLOROTHIAZIDE 12.5; 1 MG/1; MG/1
TABLET ORAL
Qty: 90 TABLET | Refills: 1 | Status: SHIPPED | OUTPATIENT
Start: 2022-03-30 | End: 2022-10-07

## 2022-07-02 DIAGNOSIS — R45.89 DEPRESSED MOOD: ICD-10-CM

## 2022-07-04 RX ORDER — ESCITALOPRAM OXALATE 10 MG/1
TABLET ORAL
Qty: 90 TABLET | Refills: 1 | Status: SHIPPED
Start: 2022-07-04 | End: 2022-07-12 | Stop reason: DRUGHIGH
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
## 2022-07-12 ENCOUNTER — OFFICE VISIT (OUTPATIENT)
Dept: FAMILY MEDICINE CLINIC | Age: 82
End: 2022-07-12
Payer: MEDICARE

## 2022-07-12 ENCOUNTER — TELEPHONE (OUTPATIENT)
Dept: FAMILY MEDICINE CLINIC | Age: 82
End: 2022-07-12

## 2022-07-12 VITALS
HEART RATE: 56 BPM | RESPIRATION RATE: 16 BRPM | SYSTOLIC BLOOD PRESSURE: 136 MMHG | TEMPERATURE: 97.5 F | WEIGHT: 210.8 LBS | BODY MASS INDEX: 34.55 KG/M2 | DIASTOLIC BLOOD PRESSURE: 82 MMHG | OXYGEN SATURATION: 97 %

## 2022-07-12 DIAGNOSIS — E03.9 HYPOTHYROIDISM, UNSPECIFIED TYPE: ICD-10-CM

## 2022-07-12 DIAGNOSIS — I10 ESSENTIAL HYPERTENSION, BENIGN: ICD-10-CM

## 2022-07-12 DIAGNOSIS — Z12.5 PROSTATE CANCER SCREENING: ICD-10-CM

## 2022-07-12 DIAGNOSIS — E11.9 TYPE 2 DIABETES MELLITUS WITHOUT COMPLICATION, WITHOUT LONG-TERM CURRENT USE OF INSULIN (HCC): ICD-10-CM

## 2022-07-12 DIAGNOSIS — R13.10 DYSPHAGIA, UNSPECIFIED TYPE: Primary | ICD-10-CM

## 2022-07-12 DIAGNOSIS — R45.89 DEPRESSED MOOD: ICD-10-CM

## 2022-07-12 DIAGNOSIS — E78.00 PURE HYPERCHOLESTEROLEMIA: ICD-10-CM

## 2022-07-12 PROCEDURE — 99214 OFFICE O/P EST MOD 30 MIN: CPT | Performed by: FAMILY MEDICINE

## 2022-07-12 PROCEDURE — 1123F ACP DISCUSS/DSCN MKR DOCD: CPT | Performed by: FAMILY MEDICINE

## 2022-07-12 RX ORDER — ESCITALOPRAM OXALATE 20 MG/1
20 TABLET ORAL DAILY
Qty: 30 TABLET | Refills: 3 | Status: SHIPPED | OUTPATIENT
Start: 2022-07-12

## 2022-07-12 SDOH — ECONOMIC STABILITY: FOOD INSECURITY: WITHIN THE PAST 12 MONTHS, YOU WORRIED THAT YOUR FOOD WOULD RUN OUT BEFORE YOU GOT MONEY TO BUY MORE.: NEVER TRUE

## 2022-07-12 SDOH — ECONOMIC STABILITY: FOOD INSECURITY: WITHIN THE PAST 12 MONTHS, THE FOOD YOU BOUGHT JUST DIDN'T LAST AND YOU DIDN'T HAVE MONEY TO GET MORE.: NEVER TRUE

## 2022-07-12 ASSESSMENT — PATIENT HEALTH QUESTIONNAIRE - PHQ9
7. TROUBLE CONCENTRATING ON THINGS, SUCH AS READING THE NEWSPAPER OR WATCHING TELEVISION: 0
1. LITTLE INTEREST OR PLEASURE IN DOING THINGS: 0
SUM OF ALL RESPONSES TO PHQ QUESTIONS 1-9: 6
5. POOR APPETITE OR OVEREATING: 0
6. FEELING BAD ABOUT YOURSELF - OR THAT YOU ARE A FAILURE OR HAVE LET YOURSELF OR YOUR FAMILY DOWN: 0
SUM OF ALL RESPONSES TO PHQ QUESTIONS 1-9: 6
10. IF YOU CHECKED OFF ANY PROBLEMS, HOW DIFFICULT HAVE THESE PROBLEMS MADE IT FOR YOU TO DO YOUR WORK, TAKE CARE OF THINGS AT HOME, OR GET ALONG WITH OTHER PEOPLE: 0
SUM OF ALL RESPONSES TO PHQ QUESTIONS 1-9: 6
2. FEELING DOWN, DEPRESSED OR HOPELESS: 3
4. FEELING TIRED OR HAVING LITTLE ENERGY: 3
3. TROUBLE FALLING OR STAYING ASLEEP: 0
8. MOVING OR SPEAKING SO SLOWLY THAT OTHER PEOPLE COULD HAVE NOTICED. OR THE OPPOSITE, BEING SO FIGETY OR RESTLESS THAT YOU HAVE BEEN MOVING AROUND A LOT MORE THAN USUAL: 0
9. THOUGHTS THAT YOU WOULD BE BETTER OFF DEAD, OR OF HURTING YOURSELF: 0
SUM OF ALL RESPONSES TO PHQ QUESTIONS 1-9: 6
SUM OF ALL RESPONSES TO PHQ9 QUESTIONS 1 & 2: 3

## 2022-07-12 ASSESSMENT — ENCOUNTER SYMPTOMS: TROUBLE SWALLOWING: 1

## 2022-07-12 ASSESSMENT — SOCIAL DETERMINANTS OF HEALTH (SDOH): HOW HARD IS IT FOR YOU TO PAY FOR THE VERY BASICS LIKE FOOD, HOUSING, MEDICAL CARE, AND HEATING?: NOT HARD AT ALL

## 2022-07-12 NOTE — TELEPHONE ENCOUNTER
Patient had an appointment today. He is asking for phone number of ENT that was recommended by Dr. Henrique Gomez.

## 2022-07-12 NOTE — TELEPHONE ENCOUNTER
[facility] - [address] [place] [address],[address]   [phone]    Please call pt with info to schedule. Thank you.

## 2022-07-13 NOTE — TELEPHONE ENCOUNTER
Attempted to call patient. They have blocked our number. Sent my chart message and mailed letter to his home.

## 2022-07-28 DIAGNOSIS — R13.10 DYSPHAGIA, UNSPECIFIED TYPE: Primary | ICD-10-CM

## 2022-08-04 ENCOUNTER — OFFICE VISIT (OUTPATIENT)
Dept: ENT CLINIC | Age: 82
End: 2022-08-04
Payer: MEDICARE

## 2022-08-04 VITALS
TEMPERATURE: 97.2 F | HEIGHT: 66 IN | SYSTOLIC BLOOD PRESSURE: 131 MMHG | WEIGHT: 210 LBS | BODY MASS INDEX: 33.75 KG/M2 | DIASTOLIC BLOOD PRESSURE: 83 MMHG

## 2022-08-04 DIAGNOSIS — H90.3 SENSORINEURAL HEARING LOSS (SNHL), BILATERAL: ICD-10-CM

## 2022-08-04 DIAGNOSIS — R13.14 PHARYNGOESOPHAGEAL DYSPHAGIA: Primary | ICD-10-CM

## 2022-08-04 PROCEDURE — 99213 OFFICE O/P EST LOW 20 MIN: CPT | Performed by: OTOLARYNGOLOGY

## 2022-08-04 PROCEDURE — 1123F ACP DISCUSS/DSCN MKR DOCD: CPT | Performed by: OTOLARYNGOLOGY

## 2022-08-04 NOTE — PROGRESS NOTES
[address] UP NOTE      Patient Name: Rola Walker Record Number:  [id number]  Primary Care Physician:  Laura Caldera DO    ChiefComplaint     Chief Complaint   Patient presents with    Other     Patient states that he is having trouble swallowing for 3-4 years. He states that he gets chocked up when he eats food       History of Present Illness     Michelle Yañez is an 79 y.o. male with dysphagia-previously seen by my colleague Dr Aldo Smith, 07/2021 with concern for progressive dysphagia with solid foods. Flexible laryngoscopy with postcricoid erythema and edema; was started on Protonix 40 mg daily and referred to speech-language pathology however he did not complete procedure as he refused to keep his mask on. Lauryn Aguirre Has history of GERD, HLD, CAD with stent placement 2013. Patient states he has difficulty swallowing hard foods such as meats, no difficulty with putting/liquid consistencies. Will cough/choke with food with immediate regurgitation-denies any regurgitation of foods every 2-3 hours. No voice changes, no throat pain, no hemoptysis. Has history of GERD in chart however denies any waterbrash/chest burning. Denies tobacco use (quit more than 10 years ago) denies alcohol use. Of interest, patient states significantly poor hearing-complete loss of hearing in the left ear, 80-90% the right ear. Wears behind the ear hearing aids (seen at [facility]) however cannot talk on the phone, must read lips even with hearing aids in place. Last 2 in 3 months ago. Interval History 8/4/2022: Last visit 12/2021 - patient continues to have choking, dysphagia approximately unknown. States he can go a full month without issue, but it can happen 2-3 times the next month. Occurred yesterday with an orange. Feels he can occasionally squeeze his throat and swallow and symptoms will improve. Only occurs with solids.      Past Medical History     Past Medical History:   Diagnosis Date    CAD (coronary artery disease)     Diabetes ([address]     Stroke [facility])        Past Surgical History     Past Surgical History:   Procedure Laterality Date    CARDIAC SURGERY  09/10/2013    3 stents       Family History     Family History   Problem Relation Age of Onset    Cancer Father     Heart Disease Father     Diabetes Father        Social History     Social History     Tobacco Use    Smoking status: Former     Packs/day: 1.00     Years: 20.00     Pack years: 20.00     Types: Cigarettes     Quit date: 2002     Years since quittin.6    Smokeless tobacco: Never   Vaping Use    Vaping Use: Never used   Substance Use Topics    Alcohol use: No    Drug use: No        Allergies     Allergies   Allergen Reactions    Penicillins Hives and Other (See Comments)       Medications     Current Outpatient Medications   Medication Sig Dispense Refill    escitalopram (LEXAPRO) 20 MG tablet Take 1 tablet by mouth daily 30 tablet 3    losartan-hydroCHLOROthiazide (HYZAAR) 100-12.5 MG per tablet TAKE ONE TABLET BY MOUTH DAILY 90 tablet 1    levothyroxine (SYNTHROID) 75 MCG tablet TAKE ONE TABLET BY MOUTH DAILY 90 tablet 1    atorvastatin (LIPITOR) 80 MG tablet Take 1 tablet by mouth daily 90 tablet 1    carvedilol (COREG) 6.25 MG tablet TAKE ONE TABLET BY MOUTH TWICE A DAY WITH A MEAL 180 tablet 1    pantoprazole (PROTONIX) 40 MG tablet Take 1 tablet by mouth Daily with supper 30 tablet 3    metFORMIN (GLUCOPHAGE) 500 MG tablet Take 1 tablet by mouth daily (with breakfast) (Patient taking differently: Take 500 mg by mouth daily (with breakfast) Not every night) 90 tablet 1    aspirin 81 MG tablet Take by mouth       No current facility-administered medications for this visit.        Review of Systems     REVIEW OF SYSTEMS  The following systems were reviewed and revealed the following in addition to any already discussed in the HPI:    CONSTITUTIONAL: No weight loss, no fever, no night sweats, no chills  EYES: no vision changes, no blurry vision  EARS: + Changes in hearing, no otalgia  NOSE: no epistaxis, no rhinorrhea  RESPIRATORY: No difficulty breathing, no shortness of breath  CV: no chest pain, no peripheral vascular disease  HEME: No coagulation disorder, no bleeding disorder  NEURO: No TIA or stroke-like symptoms  SKIN: No new rashes in the head and neck, no recent skin cancers  MOUTH: That no new ulcers, no recent teeth infections  GASTROINTESTINAL: No diarrhea, stomach pain  PSYCH: No anxiety, no depression    PhysicalExam     Vitals:    08/04/22 0918 08/04/22 0920   BP: (!) 148/85 131/83   Site: Right Upper Arm Left Upper Arm   Position: Sitting Sitting   Temp: 97.2 °F (36.2 °C)    Weight: 210 lb (95.3 kg)    Height: 5' 5.5\" (1.664 m)        PHYSICAL EXAM  /83 (Site: Left Upper Arm, Position: Sitting) Comment: States he takes his bp med at night  Temp 97.2 °F (36.2 °C)   Ht 5' 5.5\" (1.664 m)   Wt 210 lb (95.3 kg)   BMI 34.41 kg/m²     GENERAL: No Acute Distress, Alert and Oriented, no hoarseness  EYES: EOMI, Anti-icteric  NOSE: On anterior rhinoscopy there is no epistaxis, nasal mucosa within normal limits, no purulent drainage  EARS: Normal external appearance; on portable otomicroscopy:  -Right ear: External auditory canal without stenosis, tympanic membrane clear, no middle ear effusions or retractions  -Left ear: External auditory canal without stenosis, tympanic membrane clear, no middle ear effusions or retractions  -Tuning fork testing: Not tested  FACE: 1/6 House-Brackmann Scale, symmetric, sensation equal bilaterally  ORAL CAVITY: No masses or lesions palpated, uvula is midline, moist mucous membranes, 0+ tonsils, absent dentition  NECK: Normal range of motion, no thyromegaly, trachea is midline, no lymphadenopathy, no neck masses, no crepitus  CHEST: Normal respiratory effort, no retractions, breathing comfortably  SKIN: No rashes, normal appearing skin, no evidence of skin lesions/tumors  NEURO: CN 2, 3, 4, 5, 6, 7, 11, 12 intact bilaterally     Data/Imaging Review       Procedure     None    Assessment and Plan     1. Oropharyngeal dysphagia  Have previously referred him to GI however he did not follow-up due to difficulties with communication. Have referred him to another GI practitioner. 2. Bilateral hearing loss, unspecified hearing loss type  Patient with bilateral hearing loss, even with behind the ear hearing aids cannot talk on the phone. Have asked him to sign consent for us to obtain hearing results from [facility], may be a cochlear implant candidate-he is in favor of proceeding with this, and we referred him to [facility] for consideration of cochlear implantation    Return if symptoms worsen or fail to improve. Taniya Singh MD  [address] [place] [address],[address]  Department of Otolaryngology/Head and Neck Surgery  8/4/22    I have performed a head and neck physical exam personally or was physically present during the key or critical portions of the service. Medical Decision Making: The following items were considered in medical decision making:  Independent review of images  Review / order clinical lab tests  Review / order radiology tests  Decision to obtain old records  Review and summation of old records as accessed through [facility] (a summary of my findings in these old records: none)     Portions of this note were dictated using Dragon.  There may be linguistic errors secondary to the use of this program.

## 2022-08-17 NOTE — TELEPHONE ENCOUNTER
Future Appointments   Date Time Provider Shon Berrios   9/15/2022  2:30 PM DO LAUREN Rodas - [facility] 7/12/2022

## 2022-08-18 RX ORDER — CARVEDILOL 6.25 MG/1
TABLET ORAL
Qty: 180 TABLET | Refills: 1 | Status: SHIPPED | OUTPATIENT
Start: 2022-08-18

## 2022-09-15 ENCOUNTER — OFFICE VISIT (OUTPATIENT)
Dept: FAMILY MEDICINE CLINIC | Age: 82
End: 2022-09-15
Payer: MEDICARE

## 2022-09-15 VITALS
OXYGEN SATURATION: 96 % | BODY MASS INDEX: 31.83 KG/M2 | RESPIRATION RATE: 16 BRPM | HEART RATE: 60 BPM | TEMPERATURE: 96.6 F | SYSTOLIC BLOOD PRESSURE: 130 MMHG | HEIGHT: 67 IN | DIASTOLIC BLOOD PRESSURE: 74 MMHG | WEIGHT: 202.8 LBS

## 2022-09-15 DIAGNOSIS — R13.10 DYSPHAGIA, UNSPECIFIED TYPE: ICD-10-CM

## 2022-09-15 DIAGNOSIS — L91.8 SKIN TAG: ICD-10-CM

## 2022-09-15 DIAGNOSIS — H53.9 VISION DISTURBANCE: ICD-10-CM

## 2022-09-15 DIAGNOSIS — Z00.00 MEDICARE ANNUAL WELLNESS VISIT, SUBSEQUENT: Primary | ICD-10-CM

## 2022-09-15 PROCEDURE — G0439 PPPS, SUBSEQ VISIT: HCPCS | Performed by: FAMILY MEDICINE

## 2022-09-15 PROCEDURE — 1123F ACP DISCUSS/DSCN MKR DOCD: CPT | Performed by: FAMILY MEDICINE

## 2022-09-15 ASSESSMENT — PATIENT HEALTH QUESTIONNAIRE - PHQ9
7. TROUBLE CONCENTRATING ON THINGS, SUCH AS READING THE NEWSPAPER OR WATCHING TELEVISION: 0
SUM OF ALL RESPONSES TO PHQ QUESTIONS 1-9: 0
2. FEELING DOWN, DEPRESSED OR HOPELESS: 0
3. TROUBLE FALLING OR STAYING ASLEEP: 0
8. MOVING OR SPEAKING SO SLOWLY THAT OTHER PEOPLE COULD HAVE NOTICED. OR THE OPPOSITE, BEING SO FIGETY OR RESTLESS THAT YOU HAVE BEEN MOVING AROUND A LOT MORE THAN USUAL: 0
SUM OF ALL RESPONSES TO PHQ QUESTIONS 1-9: 0
SUM OF ALL RESPONSES TO PHQ9 QUESTIONS 1 & 2: 0
SUM OF ALL RESPONSES TO PHQ QUESTIONS 1-9: 0
6. FEELING BAD ABOUT YOURSELF - OR THAT YOU ARE A FAILURE OR HAVE LET YOURSELF OR YOUR FAMILY DOWN: 0
SUM OF ALL RESPONSES TO PHQ QUESTIONS 1-9: 0
4. FEELING TIRED OR HAVING LITTLE ENERGY: 0
9. THOUGHTS THAT YOU WOULD BE BETTER OFF DEAD, OR OF HURTING YOURSELF: 0
5. POOR APPETITE OR OVEREATING: 0
10. IF YOU CHECKED OFF ANY PROBLEMS, HOW DIFFICULT HAVE THESE PROBLEMS MADE IT FOR YOU TO DO YOUR WORK, TAKE CARE OF THINGS AT HOME, OR GET ALONG WITH OTHER PEOPLE: 0
1. LITTLE INTEREST OR PLEASURE IN DOING THINGS: 0

## 2022-09-15 ASSESSMENT — LIFESTYLE VARIABLES
HOW OFTEN DO YOU HAVE A DRINK CONTAINING ALCOHOL: NEVER
HOW MANY STANDARD DRINKS CONTAINING ALCOHOL DO YOU HAVE ON A TYPICAL DAY: PATIENT DOES NOT DRINK

## 2022-09-15 NOTE — PATIENT INSTRUCTIONS
Personalized Preventive Plan for Candelaria Ye - 9/15/2022  Medicare offers a range of preventive health benefits. Some of the tests and screenings are paid in full while other may be subject to a deductible, co-insurance, and/or copay. Some of these benefits include a comprehensive review of your medical history including lifestyle, illnesses that may run in your family, and various assessments and screenings as appropriate. After reviewing your medical record and screening and assessments performed today your provider may have ordered immunizations, labs, imaging, and/or referrals for you. A list of these orders (if applicable) as well as your Preventive Care list are included within your After Visit Summary for your review. Other Preventive Recommendations:    A preventive eye exam performed by an eye specialist is recommended every 1-2 years to screen for glaucoma; cataracts, macular degeneration, and other eye disorders. A preventive dental visit is recommended every 6 months. Try to get at least 150 minutes of exercise per week or 10,000 steps per day on a pedometer . Order or download the FREE \"Exercise & Physical Activity: Your Everyday Guide\" from [employer]. Call 9-955.851.6808 or search [employer] online. You need 1135-9606 mg of calcium and 8273-0186 IU of vitamin D per day. It is possible to meet your calcium requirement with diet alone, but a vitamin D supplement is usually necessary to meet this goal.  When exposed to the sun, use a sunscreen that protects against both UVA and UVB radiation with an SPF of 30 or greater. Reapply every 2 to 3 hours or after sweating, drying off with a towel, or swimming. Always wear a seat belt when traveling in a car. Always wear a helmet when riding a bicycle or motorcycle.
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
## 2022-09-17 DIAGNOSIS — E03.9 HYPOTHYROIDISM, UNSPECIFIED TYPE: ICD-10-CM

## 2022-09-19 RX ORDER — LEVOTHYROXINE SODIUM 0.07 MG/1
TABLET ORAL
Qty: 90 TABLET | Refills: 1 | Status: SHIPPED | OUTPATIENT
Start: 2022-09-19

## 2022-10-07 RX ORDER — LOSARTAN POTASSIUM AND HYDROCHLOROTHIAZIDE 12.5; 1 MG/1; MG/1
TABLET ORAL
Qty: 90 TABLET | Refills: 0 | Status: SHIPPED | OUTPATIENT
Start: 2022-10-07

## 2022-10-13 ENCOUNTER — NURSE ONLY (OUTPATIENT)
Dept: FAMILY MEDICINE CLINIC | Age: 82
End: 2022-10-13
Payer: MEDICARE

## 2022-10-13 DIAGNOSIS — Z23 FLU VACCINE NEED: Primary | ICD-10-CM

## 2022-10-13 PROCEDURE — G0008 ADMIN INFLUENZA VIRUS VAC: HCPCS | Performed by: FAMILY MEDICINE

## 2022-10-13 PROCEDURE — 90694 VACC AIIV4 NO PRSRV 0.5ML IM: CPT | Performed by: FAMILY MEDICINE

## 2022-11-15 DIAGNOSIS — R45.89 DEPRESSED MOOD: ICD-10-CM

## 2022-11-16 RX ORDER — ESCITALOPRAM OXALATE 20 MG/1
TABLET ORAL
Qty: 90 TABLET | Refills: 3 | Status: SHIPPED | OUTPATIENT
Start: 2022-11-16

## 2022-11-21 DIAGNOSIS — K21.9 LARYNGOPHARYNGEAL REFLUX (LPR): ICD-10-CM

## 2022-11-21 RX ORDER — PANTOPRAZOLE SODIUM 40 MG/1
TABLET, DELAYED RELEASE ORAL
Qty: 30 TABLET | Refills: 3 | OUTPATIENT
Start: 2022-11-21

## 2022-11-23 ENCOUNTER — TELEPHONE (OUTPATIENT)
Dept: FAMILY MEDICINE CLINIC | Age: 82
End: 2022-11-23

## 2022-11-23 DIAGNOSIS — K21.9 LARYNGOPHARYNGEAL REFLUX (LPR): ICD-10-CM

## 2022-11-23 NOTE — TELEPHONE ENCOUNTER
Patient needs a refill on Protonix. They need a 30 day supply. Mail order or local pharmacy: local     Pharmacy: [email or web address] on file    Last OV: 9/15/22    No future appointments. Pharmacy called regarding a refill on the Protonix. It was originally sent to Dr Mary Kelly and refused per pt dismissed. Please advise if Dr Rosa Petit will fill.

## 2022-11-26 RX ORDER — PANTOPRAZOLE SODIUM 40 MG/1
40 TABLET, DELAYED RELEASE ORAL
Qty: 30 TABLET | Refills: 3 | Status: SHIPPED | OUTPATIENT
Start: 2022-11-26

## 2022-12-16 ENCOUNTER — TELEPHONE (OUTPATIENT)
Dept: FAMILY MEDICINE CLINIC | Age: 82
End: 2022-12-16

## 2022-12-16 NOTE — TELEPHONE ENCOUNTER
Patient had an episode where his morning pill got stuck in his throat for about 2 hours. The pill finally came unstuck and is feeling much better. Wife wants something done for his throat. He had seen Dr. Esther Garcia at [facility] in 09/28/2022 for this condition. [place] his wife will call them for an appointment.

## 2023-01-09 RX ORDER — LOSARTAN POTASSIUM AND HYDROCHLOROTHIAZIDE 12.5; 1 MG/1; MG/1
TABLET ORAL
Qty: 90 TABLET | Refills: 1 | Status: SHIPPED | OUTPATIENT
Start: 2023-01-09

## 2023-04-17 DIAGNOSIS — E03.9 HYPOTHYROIDISM, UNSPECIFIED TYPE: ICD-10-CM

## 2023-04-17 RX ORDER — LEVOTHYROXINE SODIUM 0.07 MG/1
TABLET ORAL
Qty: 90 TABLET | Refills: 1 | Status: SHIPPED | OUTPATIENT
Start: 2023-04-17

## 2023-04-17 NOTE — TELEPHONE ENCOUNTER
9/15/2022       Future Appointments   Date Time Provider Shon Agustina   4/25/2023  1:15 PM DO LAUREN Bacon   9/19/2023  8:00 AM DO LAUREN Bacon

## 2023-04-19 DIAGNOSIS — K21.9 LARYNGOPHARYNGEAL REFLUX (LPR): ICD-10-CM

## 2023-04-19 RX ORDER — PANTOPRAZOLE SODIUM 40 MG/1
TABLET, DELAYED RELEASE ORAL
Qty: 30 TABLET | Refills: 3 | Status: SHIPPED | OUTPATIENT
Start: 2023-04-19

## 2023-04-25 ENCOUNTER — OFFICE VISIT (OUTPATIENT)
Dept: FAMILY MEDICINE CLINIC | Age: 83
End: 2023-04-25
Payer: MEDICARE

## 2023-04-25 VITALS
OXYGEN SATURATION: 94 % | TEMPERATURE: 96.9 F | SYSTOLIC BLOOD PRESSURE: 133 MMHG | BODY MASS INDEX: 31.89 KG/M2 | DIASTOLIC BLOOD PRESSURE: 87 MMHG | WEIGHT: 204.8 LBS | HEART RATE: 73 BPM | RESPIRATION RATE: 16 BRPM

## 2023-04-25 DIAGNOSIS — I10 ESSENTIAL HYPERTENSION, BENIGN: ICD-10-CM

## 2023-04-25 DIAGNOSIS — F41.9 ANXIETY: Primary | ICD-10-CM

## 2023-04-25 DIAGNOSIS — Z63.79 STRESS DUE TO ILLNESS OF FAMILY MEMBER: ICD-10-CM

## 2023-04-25 PROCEDURE — 1123F ACP DISCUSS/DSCN MKR DOCD: CPT | Performed by: FAMILY MEDICINE

## 2023-04-25 PROCEDURE — 3075F SYST BP GE 130 - 139MM HG: CPT | Performed by: FAMILY MEDICINE

## 2023-04-25 PROCEDURE — 3079F DIAST BP 80-89 MM HG: CPT | Performed by: FAMILY MEDICINE

## 2023-04-25 PROCEDURE — 99214 OFFICE O/P EST MOD 30 MIN: CPT | Performed by: FAMILY MEDICINE

## 2023-04-25 RX ORDER — BUSPIRONE HYDROCHLORIDE 5 MG/1
5 TABLET ORAL 2 TIMES DAILY
Qty: 60 TABLET | Refills: 1 | Status: SHIPPED | OUTPATIENT
Start: 2023-04-25 | End: 2023-05-25

## 2023-04-25 SDOH — ECONOMIC STABILITY: FOOD INSECURITY: WITHIN THE PAST 12 MONTHS, YOU WORRIED THAT YOUR FOOD WOULD RUN OUT BEFORE YOU GOT MONEY TO BUY MORE.: NEVER TRUE

## 2023-04-25 SDOH — ECONOMIC STABILITY: INCOME INSECURITY: HOW HARD IS IT FOR YOU TO PAY FOR THE VERY BASICS LIKE FOOD, HOUSING, MEDICAL CARE, AND HEATING?: NOT HARD AT ALL

## 2023-04-25 SDOH — ECONOMIC STABILITY: FOOD INSECURITY: WITHIN THE PAST 12 MONTHS, THE FOOD YOU BOUGHT JUST DIDN'T LAST AND YOU DIDN'T HAVE MONEY TO GET MORE.: NEVER TRUE

## 2023-04-25 ASSESSMENT — PATIENT HEALTH QUESTIONNAIRE - PHQ9
SUM OF ALL RESPONSES TO PHQ QUESTIONS 1-9: 6
SUM OF ALL RESPONSES TO PHQ QUESTIONS 1-9: 6
SUM OF ALL RESPONSES TO PHQ9 QUESTIONS 1 & 2: 6
2. FEELING DOWN, DEPRESSED OR HOPELESS: 3
SUM OF ALL RESPONSES TO PHQ QUESTIONS 1-9: 6
SUM OF ALL RESPONSES TO PHQ QUESTIONS 1-9: 6
1. LITTLE INTEREST OR PLEASURE IN DOING THINGS: 3
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
## 2023-05-09 ENCOUNTER — OFFICE VISIT (OUTPATIENT)
Dept: FAMILY MEDICINE CLINIC | Age: 83
End: 2023-05-09
Payer: MEDICARE

## 2023-05-09 VITALS
SYSTOLIC BLOOD PRESSURE: 133 MMHG | HEART RATE: 68 BPM | OXYGEN SATURATION: 94 % | DIASTOLIC BLOOD PRESSURE: 84 MMHG | WEIGHT: 205.2 LBS | BODY MASS INDEX: 31.95 KG/M2 | RESPIRATION RATE: 16 BRPM | TEMPERATURE: 96.4 F

## 2023-05-09 DIAGNOSIS — R45.89 DEPRESSED MOOD: ICD-10-CM

## 2023-05-09 DIAGNOSIS — I10 ESSENTIAL HYPERTENSION, BENIGN: ICD-10-CM

## 2023-05-09 DIAGNOSIS — Z63.79 STRESS DUE TO ILLNESS OF FAMILY MEMBER: ICD-10-CM

## 2023-05-09 DIAGNOSIS — F41.9 ANXIETY: Primary | ICD-10-CM

## 2023-05-09 PROCEDURE — 3079F DIAST BP 80-89 MM HG: CPT | Performed by: FAMILY MEDICINE

## 2023-05-09 PROCEDURE — 99214 OFFICE O/P EST MOD 30 MIN: CPT | Performed by: FAMILY MEDICINE

## 2023-05-09 PROCEDURE — 1123F ACP DISCUSS/DSCN MKR DOCD: CPT | Performed by: FAMILY MEDICINE

## 2023-05-09 PROCEDURE — 3077F SYST BP >= 140 MM HG: CPT | Performed by: FAMILY MEDICINE

## 2023-05-09 ASSESSMENT — PATIENT HEALTH QUESTIONNAIRE - PHQ9
10. IF YOU CHECKED OFF ANY PROBLEMS, HOW DIFFICULT HAVE THESE PROBLEMS MADE IT FOR YOU TO DO YOUR WORK, TAKE CARE OF THINGS AT HOME, OR GET ALONG WITH OTHER PEOPLE: 3
3. TROUBLE FALLING OR STAYING ASLEEP: 3
1. LITTLE INTEREST OR PLEASURE IN DOING THINGS: 3
SUM OF ALL RESPONSES TO PHQ QUESTIONS 1-9: 14
SUM OF ALL RESPONSES TO PHQ QUESTIONS 1-9: 14
5. POOR APPETITE OR OVEREATING: 0
8. MOVING OR SPEAKING SO SLOWLY THAT OTHER PEOPLE COULD HAVE NOTICED. OR THE OPPOSITE, BEING SO FIGETY OR RESTLESS THAT YOU HAVE BEEN MOVING AROUND A LOT MORE THAN USUAL: 0
4. FEELING TIRED OR HAVING LITTLE ENERGY: 3
SUM OF ALL RESPONSES TO PHQ QUESTIONS 1-9: 14
7. TROUBLE CONCENTRATING ON THINGS, SUCH AS READING THE NEWSPAPER OR WATCHING TELEVISION: 0
6. FEELING BAD ABOUT YOURSELF - OR THAT YOU ARE A FAILURE OR HAVE LET YOURSELF OR YOUR FAMILY DOWN: 2
9. THOUGHTS THAT YOU WOULD BE BETTER OFF DEAD, OR OF HURTING YOURSELF: 0
SUM OF ALL RESPONSES TO PHQ QUESTIONS 1-9: 14
SUM OF ALL RESPONSES TO PHQ9 QUESTIONS 1 & 2: 6
2. FEELING DOWN, DEPRESSED OR HOPELESS: 3

## 2023-05-09 ASSESSMENT — COLUMBIA-SUICIDE SEVERITY RATING SCALE - C-SSRS
1. WITHIN THE PAST MONTH, HAVE YOU WISHED YOU WERE DEAD OR WISHED YOU COULD GO TO SLEEP AND NOT WAKE UP?: NO
2. HAVE YOU ACTUALLY HAD ANY THOUGHTS OF KILLING YOURSELF?: NO
6. HAVE YOU EVER DONE ANYTHING, STARTED TO DO ANYTHING, OR PREPARED TO DO ANYTHING TO END YOUR LIFE?: NO

## 2023-05-09 ASSESSMENT — ENCOUNTER SYMPTOMS: COLOR CHANGE: 1

## 2023-05-09 NOTE — PROGRESS NOTES
2023    This is a 79 y.o. male   Chief Complaint   Patient presents with   Nehemias 1 follow up    . HPI  Pt presents today for the following:     Anxiety/Depression Follow-up: Taking Buspar 5 mg BID and Lexapro 20 mg, admits to stress related to his wife's health. States that the police were at his house 6 days ago because wife eas yelling as he was moving her , local son spoke with pt then called his bother in [address] who said he would talk with an . walker so that she could go into the house, wife starting yelling at home, neighbor called police, police came  and pt explained why pt was yelling, states that pt will need a psychological evaluation. States that the Buspar is helping, found some of wife's anxiety pills and has been taking. Has a bruise on his left eye, states he was was washing his walkway off after after mulching and sweeping 3 days ago, went to turn water off and fell, denies LOC, just noticed black eye today. HTN: Taking Losartan-HCTZ 100-12.5 mg daily, today's /88, hasn't checked BP at home for a while.    Past Medical History:   Diagnosis Date    CAD (coronary artery disease)     Diabetes ([facility] [address]     Stroke [facility])        Past Surgical History:   Procedure Laterality Date    CARDIAC SURGERY  09/10/2013    3 stents       Social History     Socioeconomic History    Marital status:      Spouse name: Not on file    Number of children: Not on file    Years of education: Not on file    Highest education level: Not on file   Occupational History    Not on file   Tobacco Use    Smoking status: Former     Packs/day: 1.00     Years: 20.00     Pack years: 20.00     Types: Cigarettes     Quit date: 2002     Years since quittin.3    Smokeless tobacco: Never   Vaping Use    Vaping Use: Never used   Substance and Sexual Activity    Alcohol use: No    Drug use: No    Sexual activity: Never   Other Topics Concern    Not on file

## 2023-05-10 ENCOUNTER — TELEPHONE (OUTPATIENT)
Dept: FAMILY MEDICINE CLINIC | Age: 83
End: 2023-05-10

## 2023-05-10 RX ORDER — HYDROXYZINE HYDROCHLORIDE 25 MG/1
25 TABLET, FILM COATED ORAL 2 TIMES DAILY
COMMUNITY

## 2023-05-10 NOTE — TELEPHONE ENCOUNTER
Yadiel Holder stopped by office. Patient reports that Dr. Muna Correa needed to know that he takes hydroxyzine 25 mg tablet take 1 tablet by mouth 2 times daily as needed for anxiety. Dr. Nena Jeffries rx medication.

## 2023-07-06 ENCOUNTER — OFFICE VISIT (OUTPATIENT)
Dept: FAMILY MEDICINE CLINIC | Age: 83
End: 2023-07-06
Payer: MEDICARE

## 2023-07-06 VITALS
HEART RATE: 61 BPM | OXYGEN SATURATION: 92 % | TEMPERATURE: 96.6 F | BODY MASS INDEX: 31.48 KG/M2 | RESPIRATION RATE: 16 BRPM | WEIGHT: 202.2 LBS | SYSTOLIC BLOOD PRESSURE: 138 MMHG | DIASTOLIC BLOOD PRESSURE: 80 MMHG

## 2023-07-06 DIAGNOSIS — R45.89 DEPRESSED MOOD: Primary | ICD-10-CM

## 2023-07-06 DIAGNOSIS — I10 ESSENTIAL HYPERTENSION, BENIGN: ICD-10-CM

## 2023-07-06 DIAGNOSIS — Z63.79 STRESS DUE TO ILLNESS OF FAMILY MEMBER: ICD-10-CM

## 2023-07-06 DIAGNOSIS — F51.01 PRIMARY INSOMNIA: ICD-10-CM

## 2023-07-06 PROCEDURE — 1123F ACP DISCUSS/DSCN MKR DOCD: CPT | Performed by: FAMILY MEDICINE

## 2023-07-06 PROCEDURE — 99214 OFFICE O/P EST MOD 30 MIN: CPT | Performed by: FAMILY MEDICINE

## 2023-07-06 PROCEDURE — 3075F SYST BP GE 130 - 139MM HG: CPT | Performed by: FAMILY MEDICINE

## 2023-07-06 PROCEDURE — 3079F DIAST BP 80-89 MM HG: CPT | Performed by: FAMILY MEDICINE

## 2023-07-06 RX ORDER — TRAZODONE HYDROCHLORIDE 50 MG/1
25 TABLET ORAL NIGHTLY
Qty: 15 TABLET | Refills: 2 | Status: SHIPPED | OUTPATIENT
Start: 2023-07-06

## 2023-07-06 RX ORDER — BUSPIRONE HYDROCHLORIDE 5 MG/1
TABLET ORAL
COMMUNITY
Start: 2023-06-01 | End: 2023-07-06

## 2023-07-06 ASSESSMENT — PATIENT HEALTH QUESTIONNAIRE - PHQ9
3. TROUBLE FALLING OR STAYING ASLEEP: 3
SUM OF ALL RESPONSES TO PHQ QUESTIONS 1-9: 14
5. POOR APPETITE OR OVEREATING: 0
10. IF YOU CHECKED OFF ANY PROBLEMS, HOW DIFFICULT HAVE THESE PROBLEMS MADE IT FOR YOU TO DO YOUR WORK, TAKE CARE OF THINGS AT HOME, OR GET ALONG WITH OTHER PEOPLE: 3
9. THOUGHTS THAT YOU WOULD BE BETTER OFF DEAD, OR OF HURTING YOURSELF: 0
2. FEELING DOWN, DEPRESSED OR HOPELESS: 3
1. LITTLE INTEREST OR PLEASURE IN DOING THINGS: 3
SUM OF ALL RESPONSES TO PHQ QUESTIONS 1-9: 14
SUM OF ALL RESPONSES TO PHQ9 QUESTIONS 1 & 2: 6
6. FEELING BAD ABOUT YOURSELF - OR THAT YOU ARE A FAILURE OR HAVE LET YOURSELF OR YOUR FAMILY DOWN: 2
7. TROUBLE CONCENTRATING ON THINGS, SUCH AS READING THE NEWSPAPER OR WATCHING TELEVISION: 0
SUM OF ALL RESPONSES TO PHQ QUESTIONS 1-9: 14
4. FEELING TIRED OR HAVING LITTLE ENERGY: 3
8. MOVING OR SPEAKING SO SLOWLY THAT OTHER PEOPLE COULD HAVE NOTICED. OR THE OPPOSITE, BEING SO FIGETY OR RESTLESS THAT YOU HAVE BEEN MOVING AROUND A LOT MORE THAN USUAL: 0
SUM OF ALL RESPONSES TO PHQ QUESTIONS 1-9: 14
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
## 2023-07-27 ENCOUNTER — OFFICE VISIT (OUTPATIENT)
Dept: FAMILY MEDICINE CLINIC | Age: 83
End: 2023-07-27
Payer: MEDICARE

## 2023-07-27 VITALS
TEMPERATURE: 96.9 F | SYSTOLIC BLOOD PRESSURE: 139 MMHG | BODY MASS INDEX: 30.89 KG/M2 | RESPIRATION RATE: 16 BRPM | OXYGEN SATURATION: 93 % | WEIGHT: 198.4 LBS | DIASTOLIC BLOOD PRESSURE: 80 MMHG | HEART RATE: 60 BPM

## 2023-07-27 DIAGNOSIS — I10 ESSENTIAL HYPERTENSION, BENIGN: ICD-10-CM

## 2023-07-27 DIAGNOSIS — Z63.79 STRESS DUE TO ILLNESS OF FAMILY MEMBER: Primary | ICD-10-CM

## 2023-07-27 DIAGNOSIS — R45.89 DEPRESSED MOOD: ICD-10-CM

## 2023-07-27 PROCEDURE — 3079F DIAST BP 80-89 MM HG: CPT | Performed by: FAMILY MEDICINE

## 2023-07-27 PROCEDURE — 99213 OFFICE O/P EST LOW 20 MIN: CPT | Performed by: FAMILY MEDICINE

## 2023-07-27 PROCEDURE — 3075F SYST BP GE 130 - 139MM HG: CPT | Performed by: FAMILY MEDICINE

## 2023-07-27 PROCEDURE — 1123F ACP DISCUSS/DSCN MKR DOCD: CPT | Performed by: FAMILY MEDICINE
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
## 2023-08-28 DIAGNOSIS — F51.01 PRIMARY INSOMNIA: ICD-10-CM

## 2023-08-28 RX ORDER — TRAZODONE HYDROCHLORIDE 50 MG/1
25 TABLET ORAL NIGHTLY
Qty: 15 TABLET | Refills: 2 | Status: SHIPPED | OUTPATIENT
Start: 2023-08-28

## 2023-08-28 NOTE — TELEPHONE ENCOUNTER
LOV 7/27/2023  Future Appointments   Date Time Provider [address]   9/19/2023  8:00 AM DO LAUREN Bazan

## 2023-08-31 ENCOUNTER — TELEPHONE (OUTPATIENT)
Dept: FAMILY MEDICINE CLINIC | Age: 83
End: 2023-08-31

## 2023-08-31 DIAGNOSIS — I10 ESSENTIAL HYPERTENSION, BENIGN: Primary | ICD-10-CM

## 2023-08-31 DIAGNOSIS — Z12.5 PROSTATE CANCER SCREENING: ICD-10-CM

## 2023-08-31 DIAGNOSIS — E03.9 HYPOTHYROIDISM, UNSPECIFIED TYPE: ICD-10-CM

## 2023-08-31 DIAGNOSIS — E11.9 TYPE 2 DIABETES MELLITUS WITHOUT COMPLICATION, WITHOUT LONG-TERM CURRENT USE OF INSULIN (HCC): ICD-10-CM

## 2023-08-31 NOTE — TELEPHONE ENCOUNTER
Please place updated AWV labs current orders    Future Appointments  2023 - 2025         Date Visit Type Department Provider    2023  8:00 AM [address] [place] [address],    Appointment Notes:    DANIA

## 2023-09-01 RX ORDER — PANTOPRAZOLE SODIUM 40 MG/1
TABLET, DELAYED RELEASE ORAL
Qty: 90 TABLET | Refills: 0 | Status: SHIPPED | OUTPATIENT
Start: 2023-09-01

## 2023-09-01 NOTE — TELEPHONE ENCOUNTER
Future Appointments   Date Time Provider [address]   9/19/2023  8:00 AM DO LAUREN Lemus - [facility]     LOV 7/27/2023

## 2023-09-05 ENCOUNTER — TELEPHONE (OUTPATIENT)
Dept: PHARMACY | Facility: CLINIC | Age: 83
End: 2023-09-05

## 2023-09-05 NOTE — TELEPHONE ENCOUNTER
[facility] CLINICAL PHARMACY: ADHERENCE REVIEW  Identified care gap per [place]: fills at [facility]: ACE/ARB adherence    ASSESSMENT    ACE/ARB ADHERENCE    Insurance Records claims through 23 (Prior Year  [address] = not reported;  [address] = 77%; Potential Fail Date: 23):   LOSARTAN/HCT -12.5mg last filled on 23 for 90 day supply. Next refill due: 23    Prescribed si tablet/capsule daily    Per Insurer Portal: same. 1RF    Per [employer]: pushed refill using order status. Ready tomorrow after 2PM    BP Readings from Last 3 Encounters:   23 139/80   23 138/80   23 133/84     CrCl cannot be calculated (Patient's most recent lab result is older than the maximum 180 days allowed. ). Lab Results   Component Value Date    CREATININE 1.1 10/06/2021     Lab Results   Component Value Date    K 4.7 10/06/2021         The following are interventions that have been identified:   Patient overdue refilling LOSARTAN/HCT -12.5mg and active on home medication list.   Refill/s of LOSARTAN/HCT -12.5mg READY to  at patient's [employer]    Attempting to reach patient to review - unable to leave message. [employer] message sent to patient. Last Visit: 23  Next Visit: 23    Ann Amor CPhT.    [facility] free: [phone]     For Pharmacy Admin Tracking Only    Program: Gwendolyn in place:  No  Recommendation Provided To: Pharmacy: 1  Intervention Detail: Refill(s) Provided  Intervention Accepted By: Pharmacy: 1  Gap Closed?: Yes   Time Spent (min): 15

## 2023-09-06 RX ORDER — LOSARTAN POTASSIUM AND HYDROCHLOROTHIAZIDE 12.5; 1 MG/1; MG/1
TABLET ORAL
Qty: 90 TABLET | Refills: 1 | Status: SHIPPED | OUTPATIENT
Start: 2023-09-06

## 2023-09-06 NOTE — TELEPHONE ENCOUNTER
Future Appointments   Date Time Provider [address]   9/19/2023  8:00 AM DO LAUREN Pascual 7/27/2023

## 2023-09-12 DIAGNOSIS — Z63.79 STRESS DUE TO ILLNESS OF FAMILY MEMBER: ICD-10-CM

## 2023-09-12 DIAGNOSIS — F41.9 ANXIETY: ICD-10-CM

## 2023-09-13 RX ORDER — BUSPIRONE HYDROCHLORIDE 5 MG/1
5 TABLET ORAL 2 TIMES DAILY
Qty: 60 TABLET | Refills: 5 | Status: SHIPPED | OUTPATIENT
Start: 2023-09-13

## 2023-09-19 ENCOUNTER — OFFICE VISIT (OUTPATIENT)
Dept: FAMILY MEDICINE CLINIC | Age: 83
End: 2023-09-19
Payer: MEDICARE

## 2023-09-19 VITALS
DIASTOLIC BLOOD PRESSURE: 84 MMHG | SYSTOLIC BLOOD PRESSURE: 138 MMHG | TEMPERATURE: 97.7 F | BODY MASS INDEX: 31.39 KG/M2 | RESPIRATION RATE: 16 BRPM | WEIGHT: 200 LBS | HEIGHT: 67 IN | HEART RATE: 50 BPM | OXYGEN SATURATION: 98 %

## 2023-09-19 DIAGNOSIS — E11.9 TYPE 2 DIABETES MELLITUS WITHOUT COMPLICATION, WITHOUT LONG-TERM CURRENT USE OF INSULIN (HCC): ICD-10-CM

## 2023-09-19 DIAGNOSIS — I10 ESSENTIAL HYPERTENSION, BENIGN: ICD-10-CM

## 2023-09-19 DIAGNOSIS — E03.9 HYPOTHYROIDISM, UNSPECIFIED TYPE: ICD-10-CM

## 2023-09-19 DIAGNOSIS — Z00.00 MEDICARE ANNUAL WELLNESS VISIT, SUBSEQUENT: Primary | ICD-10-CM

## 2023-09-19 DIAGNOSIS — Z12.5 PROSTATE CANCER SCREENING: ICD-10-CM

## 2023-09-19 DIAGNOSIS — Z63.79 STRESS DUE TO ILLNESS OF FAMILY MEMBER: ICD-10-CM

## 2023-09-19 DIAGNOSIS — F41.9 ANXIETY: ICD-10-CM

## 2023-09-19 LAB
ALBUMIN SERPL-MCNC: 4.6 G/DL (ref 3.4–5)
ALBUMIN/GLOB SERPL: 2 {RATIO} (ref 1.1–2.2)
ALP SERPL-CCNC: 81 U/L (ref 40–129)
ALT SERPL-CCNC: 9 U/L (ref 10–40)
ANION GAP SERPL CALCULATED.3IONS-SCNC: 15 MMOL/L (ref 3–16)
AST SERPL-CCNC: 14 U/L (ref 15–37)
BASOPHILS # BLD: 0 K/UL (ref 0–0.2)
BASOPHILS NFR BLD: 0.5 %
BILIRUB SERPL-MCNC: 0.9 MG/DL (ref 0–1)
BUN SERPL-MCNC: 13 MG/DL (ref 7–20)
CALCIUM SERPL-MCNC: 9.2 MG/DL (ref 8.3–10.6)
CHLORIDE SERPL-SCNC: 98 MMOL/L (ref 99–110)
CHOLEST SERPL-MCNC: 252 MG/DL (ref 0–199)
CO2 SERPL-SCNC: 25 MMOL/L (ref 21–32)
CREAT SERPL-MCNC: 0.9 MG/DL (ref 0.8–1.3)
DEPRECATED RDW RBC AUTO: 15 % (ref 12.4–15.4)
EOSINOPHIL # BLD: 0.2 K/UL (ref 0–0.6)
EOSINOPHIL NFR BLD: 3.6 %
GFR SERPLBLD CREATININE-BSD FMLA CKD-EPI: >60 ML/MIN/{1.73_M2}
GLUCOSE SERPL-MCNC: 108 MG/DL (ref 70–99)
HCT VFR BLD AUTO: 47.8 % (ref 40.5–52.5)
HDLC SERPL-MCNC: 39 MG/DL (ref 40–60)
HGB BLD-MCNC: 16.1 G/DL (ref 13.5–17.5)
LDLC SERPL CALC-MCNC: 184 MG/DL
LYMPHOCYTES # BLD: 1.4 K/UL (ref 1–5.1)
LYMPHOCYTES NFR BLD: 22 %
MCH RBC QN AUTO: 29 PG (ref 26–34)
MCHC RBC AUTO-ENTMCNC: 33.6 G/DL (ref 31–36)
MCV RBC AUTO: 86.3 FL (ref 80–100)
MONOCYTES # BLD: 0.4 K/UL (ref 0–1.3)
MONOCYTES NFR BLD: 6.8 %
NEUTROPHILS # BLD: 4.2 K/UL (ref 1.7–7.7)
NEUTROPHILS NFR BLD: 67.1 %
PLATELET # BLD AUTO: 164 K/UL (ref 135–450)
PMV BLD AUTO: 10.5 FL (ref 5–10.5)
POTASSIUM SERPL-SCNC: 5 MMOL/L (ref 3.5–5.1)
PROT SERPL-MCNC: 6.9 G/DL (ref 6.4–8.2)
PSA SERPL DL<=0.01 NG/ML-MCNC: 0.11 NG/ML (ref 0–4)
RBC # BLD AUTO: 5.54 M/UL (ref 4.2–5.9)
SODIUM SERPL-SCNC: 138 MMOL/L (ref 136–145)
T4 FREE SERPL-MCNC: 1.1 NG/DL (ref 0.9–1.8)
TRIGL SERPL-MCNC: 147 MG/DL (ref 0–150)
TSH SERPL DL<=0.005 MIU/L-ACNC: 5.59 UIU/ML (ref 0.27–4.2)
VLDLC SERPL CALC-MCNC: 29 MG/DL
WBC # BLD AUTO: 6.2 K/UL (ref 4–11)

## 2023-09-19 PROCEDURE — G0439 PPPS, SUBSEQ VISIT: HCPCS | Performed by: FAMILY MEDICINE

## 2023-09-19 PROCEDURE — 1123F ACP DISCUSS/DSCN MKR DOCD: CPT | Performed by: FAMILY MEDICINE

## 2023-09-19 PROCEDURE — 3077F SYST BP >= 140 MM HG: CPT | Performed by: FAMILY MEDICINE

## 2023-09-19 PROCEDURE — 3080F DIAST BP >= 90 MM HG: CPT | Performed by: FAMILY MEDICINE

## 2023-09-19 PROCEDURE — 99213 OFFICE O/P EST LOW 20 MIN: CPT | Performed by: FAMILY MEDICINE

## 2023-09-19 RX ORDER — LOSARTAN POTASSIUM AND HYDROCHLOROTHIAZIDE 25; 100 MG/1; MG/1
1 TABLET ORAL DAILY
Qty: 90 TABLET | Refills: 1 | Status: SHIPPED | OUTPATIENT
Start: 2023-09-19

## 2023-09-19 ASSESSMENT — PATIENT HEALTH QUESTIONNAIRE - PHQ9
6. FEELING BAD ABOUT YOURSELF - OR THAT YOU ARE A FAILURE OR HAVE LET YOURSELF OR YOUR FAMILY DOWN: 2
5. POOR APPETITE OR OVEREATING: 0
3. TROUBLE FALLING OR STAYING ASLEEP: 3
4. FEELING TIRED OR HAVING LITTLE ENERGY: 3
SUM OF ALL RESPONSES TO PHQ QUESTIONS 1-9: 13
1. LITTLE INTEREST OR PLEASURE IN DOING THINGS: 2
SUM OF ALL RESPONSES TO PHQ QUESTIONS 1-9: 13
8. MOVING OR SPEAKING SO SLOWLY THAT OTHER PEOPLE COULD HAVE NOTICED. OR THE OPPOSITE, BEING SO FIGETY OR RESTLESS THAT YOU HAVE BEEN MOVING AROUND A LOT MORE THAN USUAL: 0
SUM OF ALL RESPONSES TO PHQ9 QUESTIONS 1 & 2: 5
10. IF YOU CHECKED OFF ANY PROBLEMS, HOW DIFFICULT HAVE THESE PROBLEMS MADE IT FOR YOU TO DO YOUR WORK, TAKE CARE OF THINGS AT HOME, OR GET ALONG WITH OTHER PEOPLE: 3
SUM OF ALL RESPONSES TO PHQ QUESTIONS 1-9: 13
7. TROUBLE CONCENTRATING ON THINGS, SUCH AS READING THE NEWSPAPER OR WATCHING TELEVISION: 0
SUM OF ALL RESPONSES TO PHQ QUESTIONS 1-9: 13
9. THOUGHTS THAT YOU WOULD BE BETTER OFF DEAD, OR OF HURTING YOURSELF: 0
2. FEELING DOWN, DEPRESSED OR HOPELESS: 3

## 2023-09-19 ASSESSMENT — COLUMBIA-SUICIDE SEVERITY RATING SCALE - C-SSRS
6. HAVE YOU EVER DONE ANYTHING, STARTED TO DO ANYTHING, OR PREPARED TO DO ANYTHING TO END YOUR LIFE?: NO
1. WITHIN THE PAST MONTH, HAVE YOU WISHED YOU WERE DEAD OR WISHED YOU COULD GO TO SLEEP AND NOT WAKE UP?: NO
2. HAVE YOU ACTUALLY HAD ANY THOUGHTS OF KILLING YOURSELF?: NO

## 2023-09-19 NOTE — PROGRESS NOTES
Medicare Annual Wellness Visit    Delfino Guerrero is here for Medicare AWV (Awv/)    Assessment & Plan   Medicare annual wellness visit, subsequent  Recommendations for Preventive Services Due: see orders and patient instructions/AVS.  Recommended screening schedule for the next 5-10 years is provided to the patient in written form: see Patient Instructions/AVS.     No follow-ups on file. Subjective   The following acute and/or chronic problems were also addressed today:  HTN  - Taking Losartan-HCTZ 100-12.5 mg and Carvedilol 6.25 mg BID  - Today's /96    Labs ordered on 08/31/23 have not been done. Patient's complete Health Risk Assessment and screening values have been reviewed and are found in Flowsheets. The following problems were reviewed today and where indicated follow up appointments were made and/or referrals ordered. Positive Risk Factor Screenings with Interventions:    Fall Risk:  Do you feel unsteady or are you worried about falling? : (!) yes  2 or more falls in past year?: (!) yes  Fall with injury in past year?: (!) yes     Interventions:    Patient declines any further evaluation or treatment    Cognitive: Words recalled: 3 Words Recalled   Clock Drawing Test (CDT): (!) Abnormal   Total Score: 3   Total Score Interpretation: Normal Mini-Cog      Interventions:  Pt's short and long term memory good    Depression:  PHQ-2 Score: 5  PHQ-9 Total Score: 13    Interpretation:   1-4 = minimal  5-9 = mild  10-14 = moderate  15-19 = moderately severe  20-27 = severe  Interventions:  Was taking 1/2 tab Buspar 5 mg BID and has recently started taking entire tablet          General HRA Questions:  Select all that apply: (!) Stress, Anger    Stress Interventions:  Pt has been under extreme stress d/t his wife's medical issues and recent hispitalizations    Anger Interventions:  Pt very stressed over his wife's medical issues.       Social and Emotional Support:  Do you get the social and

## 2023-09-29 ENCOUNTER — TELEPHONE (OUTPATIENT)
Dept: FAMILY MEDICINE CLINIC | Age: 83
End: 2023-09-29

## 2023-09-29 DIAGNOSIS — F51.01 PRIMARY INSOMNIA: ICD-10-CM

## 2023-09-29 RX ORDER — TRAZODONE HYDROCHLORIDE 50 MG/1
25 TABLET ORAL NIGHTLY
Qty: 15 TABLET | Refills: 2 | Status: SHIPPED | OUTPATIENT
Start: 2023-09-29

## 2023-09-29 NOTE — TELEPHONE ENCOUNTER
Patient needs a refill on traZODone (DESYREL) 50 MG tablet  . They need a 30 day supply.          Pharmacy: Rosangela Matamoros [id number] [facility], [address] [place] [address]

## 2023-09-29 NOTE — TELEPHONE ENCOUNTER
Future Appointments   Date Time Provider [address]   11/21/2023  2:40 PM Jacque Hickman DO [address] [facility] 9/19/2023

## 2023-11-21 ENCOUNTER — OFFICE VISIT (OUTPATIENT)
Dept: FAMILY MEDICINE CLINIC | Age: 83
End: 2023-11-21
Payer: MEDICARE

## 2023-11-21 VITALS
WEIGHT: 202.2 LBS | TEMPERATURE: 96.2 F | DIASTOLIC BLOOD PRESSURE: 82 MMHG | OXYGEN SATURATION: 98 % | HEART RATE: 72 BPM | RESPIRATION RATE: 16 BRPM | SYSTOLIC BLOOD PRESSURE: 131 MMHG | BODY MASS INDEX: 31.57 KG/M2

## 2023-11-21 DIAGNOSIS — F41.9 ANXIETY: ICD-10-CM

## 2023-11-21 DIAGNOSIS — I10 ESSENTIAL HYPERTENSION, BENIGN: ICD-10-CM

## 2023-11-21 DIAGNOSIS — E11.9 TYPE 2 DIABETES MELLITUS WITHOUT COMPLICATION, WITHOUT LONG-TERM CURRENT USE OF INSULIN (HCC): ICD-10-CM

## 2023-11-21 DIAGNOSIS — I73.9 PVD (PERIPHERAL VASCULAR DISEASE) (HCC): ICD-10-CM

## 2023-11-21 DIAGNOSIS — R45.89 DEPRESSED MOOD: Primary | ICD-10-CM

## 2023-11-21 PROCEDURE — 1123F ACP DISCUSS/DSCN MKR DOCD: CPT | Performed by: FAMILY MEDICINE

## 2023-11-21 PROCEDURE — 99214 OFFICE O/P EST MOD 30 MIN: CPT | Performed by: FAMILY MEDICINE

## 2023-11-21 PROCEDURE — 3079F DIAST BP 80-89 MM HG: CPT | Performed by: FAMILY MEDICINE

## 2023-11-21 PROCEDURE — 3075F SYST BP GE 130 - 139MM HG: CPT | Performed by: FAMILY MEDICINE

## 2023-11-22 ENCOUNTER — TELEPHONE (OUTPATIENT)
Dept: FAMILY MEDICINE CLINIC | Age: 83
End: 2023-11-22

## 2023-11-22 DIAGNOSIS — E78.00 PURE HYPERCHOLESTEROLEMIA: ICD-10-CM

## 2023-11-22 RX ORDER — CARVEDILOL 6.25 MG/1
6.25 TABLET ORAL 2 TIMES DAILY WITH MEALS
Qty: 180 TABLET | Refills: 1 | Status: CANCELLED | OUTPATIENT
Start: 2023-11-22

## 2023-11-22 RX ORDER — ATORVASTATIN CALCIUM 80 MG/1
80 TABLET, FILM COATED ORAL DAILY
Qty: 90 TABLET | Refills: 1 | Status: SHIPPED | OUTPATIENT
Start: 2023-11-22

## 2023-11-22 RX ORDER — CARVEDILOL 6.25 MG/1
6.25 TABLET ORAL 2 TIMES DAILY WITH MEALS
Qty: 180 TABLET | Refills: 1 | Status: SHIPPED | OUTPATIENT
Start: 2023-11-22

## 2023-11-22 NOTE — TELEPHONE ENCOUNTER
Tried calling patient to ask if patient needs refill of Atorvastatin as he just had one sent for 90 days on 11/07/2023.

## 2023-11-22 NOTE — TELEPHONE ENCOUNTER
Patient walked in. He received his AVS yesterday and there are 2 medications he is out of medicine.    Atorvastatin 80 mg one daily  Carvedilol 6.25 mg one twice daily     [facility]

## 2023-12-13 DIAGNOSIS — R45.89 DEPRESSED MOOD: ICD-10-CM

## 2023-12-14 RX ORDER — ESCITALOPRAM OXALATE 20 MG/1
TABLET ORAL
Qty: 90 TABLET | Refills: 3 | Status: SHIPPED | OUTPATIENT
Start: 2023-12-14

## 2023-12-14 NOTE — TELEPHONE ENCOUNTER
11/21/2023    Future Appointments   Date Time Provider [address]   2/21/2024  3:00 PM Rocio, [address]

## 2024-01-22 DIAGNOSIS — F51.01 PRIMARY INSOMNIA: ICD-10-CM

## 2024-01-22 NOTE — TELEPHONE ENCOUNTER
LOV 11/21/2023  Future Appointments   Date Time Provider Department Center   2/21/2024  3:00 PM Alexis Chan DO [facility] [place] - [facility]

## 2024-01-23 RX ORDER — TRAZODONE HYDROCHLORIDE 50 MG/1
25 TABLET ORAL NIGHTLY
Qty: 45 TABLET | Refills: 1 | Status: SHIPPED | OUTPATIENT
Start: 2024-01-23

## 2024-01-30 ENCOUNTER — HOSPITAL ENCOUNTER (OUTPATIENT)
Dept: GENERAL RADIOLOGY | Age: 84
Discharge: HOME | End: 2024-01-30
Payer: MEDICARE

## 2024-01-30 ENCOUNTER — TELEPHONE (OUTPATIENT)
Dept: FAMILY MEDICINE CLINIC | Age: 84
End: 2024-01-30

## 2024-01-30 ENCOUNTER — OFFICE VISIT (OUTPATIENT)
Dept: FAMILY MEDICINE CLINIC | Age: 84
End: 2024-01-30
Payer: MEDICARE

## 2024-01-30 VITALS
HEART RATE: 61 BPM | WEIGHT: 195.8 LBS | RESPIRATION RATE: 16 BRPM | BODY MASS INDEX: 30.58 KG/M2 | DIASTOLIC BLOOD PRESSURE: 83 MMHG | SYSTOLIC BLOOD PRESSURE: 133 MMHG | OXYGEN SATURATION: 95 % | TEMPERATURE: 97 F

## 2024-01-30 DIAGNOSIS — E11.9 TYPE 2 DIABETES MELLITUS WITHOUT COMPLICATION, WITHOUT LONG-TERM CURRENT USE OF INSULIN (HCC): Primary | ICD-10-CM

## 2024-01-30 DIAGNOSIS — I73.9 PVD (PERIPHERAL VASCULAR DISEASE) (HCC): ICD-10-CM

## 2024-01-30 DIAGNOSIS — R13.10 DYSPHAGIA, UNSPECIFIED TYPE: ICD-10-CM

## 2024-01-30 DIAGNOSIS — R05.8 PRODUCTIVE COUGH: ICD-10-CM

## 2024-01-30 LAB — HBA1C MFR BLD: 6.6 %

## 2024-01-30 PROCEDURE — 3079F DIAST BP 80-89 MM HG: CPT | Performed by: FAMILY MEDICINE

## 2024-01-30 PROCEDURE — 71046 X-RAY EXAM CHEST 2 VIEWS: CPT

## 2024-01-30 PROCEDURE — 3075F SYST BP GE 130 - 139MM HG: CPT | Performed by: FAMILY MEDICINE

## 2024-01-30 PROCEDURE — 3044F HG A1C LEVEL LT 7.0%: CPT | Performed by: FAMILY MEDICINE

## 2024-01-30 PROCEDURE — 83036 HEMOGLOBIN GLYCOSYLATED A1C: CPT | Performed by: FAMILY MEDICINE

## 2024-01-30 PROCEDURE — 99214 OFFICE O/P EST MOD 30 MIN: CPT | Performed by: FAMILY MEDICINE

## 2024-01-30 PROCEDURE — 1123F ACP DISCUSS/DSCN MKR DOCD: CPT | Performed by: FAMILY MEDICINE

## 2024-01-30 RX ORDER — DOXYCYCLINE HYCLATE 100 MG
100 TABLET ORAL 2 TIMES DAILY
Qty: 10 TABLET | Refills: 0 | Status: SHIPPED | OUTPATIENT
Start: 2024-01-30 | End: 2024-01-31 | Stop reason: ALTCHOICE

## 2024-01-30 RX ORDER — BENZONATATE 200 MG/1
200 CAPSULE ORAL 3 TIMES DAILY PRN
Qty: 30 CAPSULE | Refills: 2 | Status: SHIPPED | OUTPATIENT
Start: 2024-01-30

## 2024-01-30 ASSESSMENT — PATIENT HEALTH QUESTIONNAIRE - PHQ9
6. FEELING BAD ABOUT YOURSELF - OR THAT YOU ARE A FAILURE OR HAVE LET YOURSELF OR YOUR FAMILY DOWN: 2
10. IF YOU CHECKED OFF ANY PROBLEMS, HOW DIFFICULT HAVE THESE PROBLEMS MADE IT FOR YOU TO DO YOUR WORK, TAKE CARE OF THINGS AT HOME, OR GET ALONG WITH OTHER PEOPLE: 3
SUM OF ALL RESPONSES TO PHQ QUESTIONS 1-9: 13
3. TROUBLE FALLING OR STAYING ASLEEP: 3
SUM OF ALL RESPONSES TO PHQ QUESTIONS 1-9: 13
1. LITTLE INTEREST OR PLEASURE IN DOING THINGS: 2
8. MOVING OR SPEAKING SO SLOWLY THAT OTHER PEOPLE COULD HAVE NOTICED. OR THE OPPOSITE, BEING SO FIGETY OR RESTLESS THAT YOU HAVE BEEN MOVING AROUND A LOT MORE THAN USUAL: 0
5. POOR APPETITE OR OVEREATING: 0
SUM OF ALL RESPONSES TO PHQ9 QUESTIONS 1 & 2: 5
2. FEELING DOWN, DEPRESSED OR HOPELESS: 3
4. FEELING TIRED OR HAVING LITTLE ENERGY: 3
SUM OF ALL RESPONSES TO PHQ QUESTIONS 1-9: 13
SUM OF ALL RESPONSES TO PHQ QUESTIONS 1-9: 13
9. THOUGHTS THAT YOU WOULD BE BETTER OFF DEAD, OR OF HURTING YOURSELF: 0
7. TROUBLE CONCENTRATING ON THINGS, SUCH AS READING THE NEWSPAPER OR WATCHING TELEVISION: 0

## 2024-01-30 NOTE — TELEPHONE ENCOUNTER
Radiology called to notify of pt's XR Chest results please review.    Questionable left lower lobe atelectasis versus airspace disease.  Recommend  follow-up to resolution.

## 2024-01-30 NOTE — PROGRESS NOTES
1/30/2024    This is a 83 y.o. male   Chief Complaint   Patient presents with    Diabetes    Post-COVID Symptoms     COVID 1/19/24, persistent productive cough    .    HPI  Pt presents today for:    T2DM:  a diabetic follow-up. Currently taking no prescription medication.     Morning glucose readings:     Denies fatigue, vision changes, polydipsia, polyphagia, polyuria, or foot numbness.    Labs ordered on 11/21/23 have not been done.    Last Eye Exam: January 2024 - negative for DR  Last Foot Exam: Will do at next  Last Microalbumin: Will do at next visit    Today's A1C = 6.6    COVD 19: positive on 01/19/24, took Paxlovid, admits to to a persistent productive cough that developed after having COVID, subsequent tests have been negative.  Went to urgent care and was told he has a yeats infection in his, medication prescribed (can't recall name)  Past Medical History:   Diagnosis Date    CAD (coronary artery disease)     Diabetes (HCC)     Stroke (HCC)        Orders Only on 09/19/2023   Component Date Value Ref Range Status    PSA 09/19/2023 0.11  0.00 - 4.00 ng/mL Final    Sodium 09/19/2023 138  136 - 145 mmol/L Final    Potassium 09/19/2023 5.0  3.5 - 5.1 mmol/L Final    Chloride 09/19/2023 98 (L)  99 - 110 mmol/L Final    CO2 09/19/2023 25  21 - 32 mmol/L Final    Anion Gap 09/19/2023 15  3 - 16 Final    Glucose 09/19/2023 108 (H)  70 - 99 mg/dL Final    BUN 09/19/2023 13  7 - 20 mg/dL Final    Creatinine 09/19/2023 0.9  0.8 - 1.3 mg/dL Final    Est, Glom Filt Rate 09/19/2023 >60  >60 Final    Comment: Pediatric calculator link  https://www.kidney.org/professionals/kdoqi/gfr_calculatorped  Effective Oct 3, 2022  These results are not intended for use in patients  <18 years of age.  eGFR results are calculated without  a race factor using the 2021 CKD-EPI equation.  Careful  clinical correlation is recommended, particularly when  comparing to results calculated using previous equations.  The CKD-EPI equation is

## 2024-01-31 DIAGNOSIS — J18.9 PNEUMONIA OF LEFT LOWER LOBE DUE TO INFECTIOUS ORGANISM: Primary | ICD-10-CM

## 2024-01-31 RX ORDER — AZITHROMYCIN 250 MG/1
250 TABLET, FILM COATED ORAL DAILY
Qty: 1 PACKET | Refills: 0 | Status: SHIPPED | OUTPATIENT
Start: 2024-01-31

## 2024-02-07 ENCOUNTER — OFFICE VISIT (OUTPATIENT)
Dept: FAMILY MEDICINE CLINIC | Age: 84
End: 2024-02-07
Payer: MEDICARE

## 2024-02-07 VITALS
DIASTOLIC BLOOD PRESSURE: 78 MMHG | BODY MASS INDEX: 31.39 KG/M2 | WEIGHT: 201 LBS | HEART RATE: 60 BPM | RESPIRATION RATE: 16 BRPM | SYSTOLIC BLOOD PRESSURE: 133 MMHG | OXYGEN SATURATION: 94 % | TEMPERATURE: 97.1 F

## 2024-02-07 DIAGNOSIS — F41.9 ANXIETY: ICD-10-CM

## 2024-02-07 DIAGNOSIS — J18.9 PNEUMONIA OF LEFT LOWER LOBE DUE TO INFECTIOUS ORGANISM: Primary | ICD-10-CM

## 2024-02-07 PROCEDURE — 3075F SYST BP GE 130 - 139MM HG: CPT | Performed by: FAMILY MEDICINE

## 2024-02-07 PROCEDURE — 3078F DIAST BP <80 MM HG: CPT | Performed by: FAMILY MEDICINE

## 2024-02-07 PROCEDURE — 1123F ACP DISCUSS/DSCN MKR DOCD: CPT | Performed by: FAMILY MEDICINE

## 2024-02-07 PROCEDURE — 99214 OFFICE O/P EST MOD 30 MIN: CPT | Performed by: FAMILY MEDICINE

## 2024-02-07 RX ORDER — BUSPIRONE HYDROCHLORIDE 5 MG/1
5 TABLET ORAL 3 TIMES DAILY
Qty: 90 TABLET | Refills: 0 | Status: SHIPPED | OUTPATIENT
Start: 2024-02-07 | End: 2024-03-08

## 2024-02-07 ASSESSMENT — ENCOUNTER SYMPTOMS: COUGH: 1
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
## 2024-02-11 DIAGNOSIS — E03.9 HYPOTHYROIDISM, UNSPECIFIED TYPE: ICD-10-CM

## 2024-02-12 RX ORDER — LEVOTHYROXINE SODIUM 0.07 MG/1
TABLET ORAL
Qty: 90 TABLET | Refills: 1 | Status: SHIPPED | OUTPATIENT
Start: 2024-02-12

## 2024-02-12 NOTE — TELEPHONE ENCOUNTER
LOV 2/7/2024  Future Appointments   Date Time Provider Department Center   2/22/2024  3:00 PM Alexis Chan DO [facility] [place] - [facility]

## 2024-02-22 ENCOUNTER — OFFICE VISIT (OUTPATIENT)
Dept: FAMILY MEDICINE CLINIC | Age: 84
End: 2024-02-22
Payer: MEDICARE

## 2024-02-22 VITALS
OXYGEN SATURATION: 91 % | WEIGHT: 201 LBS | TEMPERATURE: 97.1 F | DIASTOLIC BLOOD PRESSURE: 70 MMHG | BODY MASS INDEX: 31.39 KG/M2 | SYSTOLIC BLOOD PRESSURE: 110 MMHG | HEART RATE: 78 BPM | RESPIRATION RATE: 16 BRPM

## 2024-02-22 DIAGNOSIS — I10 ESSENTIAL HYPERTENSION, BENIGN: ICD-10-CM

## 2024-02-22 DIAGNOSIS — F41.9 ANXIETY: Primary | ICD-10-CM

## 2024-02-22 PROCEDURE — 1123F ACP DISCUSS/DSCN MKR DOCD: CPT | Performed by: FAMILY MEDICINE

## 2024-02-22 PROCEDURE — 99214 OFFICE O/P EST MOD 30 MIN: CPT | Performed by: FAMILY MEDICINE

## 2024-02-22 PROCEDURE — 3078F DIAST BP <80 MM HG: CPT | Performed by: FAMILY MEDICINE

## 2024-02-22 PROCEDURE — 3074F SYST BP LT 130 MM HG: CPT | Performed by: FAMILY MEDICINE

## 2024-02-22 RX ORDER — BUSPIRONE HYDROCHLORIDE 5 MG/1
TABLET ORAL
Qty: 120 TABLET | Refills: 1 | Status: SHIPPED | OUTPATIENT
Start: 2024-02-22

## 2024-02-22 SDOH — ECONOMIC STABILITY: FOOD INSECURITY: WITHIN THE PAST 12 MONTHS, YOU WORRIED THAT YOUR FOOD WOULD RUN OUT BEFORE YOU GOT MONEY TO BUY MORE.: NEVER TRUE

## 2024-02-22 SDOH — ECONOMIC STABILITY: INCOME INSECURITY: HOW HARD IS IT FOR YOU TO PAY FOR THE VERY BASICS LIKE FOOD, HOUSING, MEDICAL CARE, AND HEATING?: NOT HARD AT ALL

## 2024-02-22 SDOH — ECONOMIC STABILITY: FOOD INSECURITY: WITHIN THE PAST 12 MONTHS, THE FOOD YOU BOUGHT JUST DIDN'T LAST AND YOU DIDN'T HAVE MONEY TO GET MORE.: NEVER TRUE

## 2024-02-22 NOTE — PROGRESS NOTES
Trazodone 50 mg at night.      2. Essential hypertension, benign  Stable, continue carvedilol 6.25 mg twice daily and losartan hydrochlorothiazide 100-25 mg daily        CXR in 2 weeks for pneumonia f/u.    Return in about 3 weeks (around 3/14/2024) for Anxiety F/U.

## 2024-03-06 ENCOUNTER — HOSPITAL ENCOUNTER (OUTPATIENT)
Dept: GENERAL RADIOLOGY | Age: 84
Discharge: HOME | End: 2024-03-06
Payer: MEDICARE

## 2024-03-06 ENCOUNTER — TELEPHONE (OUTPATIENT)
Dept: FAMILY MEDICINE CLINIC | Age: 84
End: 2024-03-06

## 2024-03-06 DIAGNOSIS — J18.9 PNEUMONIA OF LEFT LOWER LOBE DUE TO INFECTIOUS ORGANISM: ICD-10-CM

## 2024-03-06 DIAGNOSIS — R93.89 ABNORMAL CHEST X-RAY: ICD-10-CM

## 2024-03-06 DIAGNOSIS — J18.9 PNEUMONIA OF LEFT LOWER LOBE DUE TO INFECTIOUS ORGANISM: Primary | ICD-10-CM

## 2024-03-06 PROCEDURE — 71046 X-RAY EXAM CHEST 2 VIEWS: CPT

## 2024-03-06 RX ORDER — LEVOFLOXACIN 750 MG/1
750 TABLET, FILM COATED ORAL DAILY
Qty: 5 TABLET | Refills: 0 | Status: SHIPPED | OUTPATIENT
Start: 2024-03-06 | End: 2024-03-11

## 2024-03-06 NOTE — TELEPHONE ENCOUNTER
Carolyne with Radiology partner's called.  Positive chest x-ray result    IMPRESSION:  Persistent hazy opacities in the bilateral lower lobes which may reflect  atelectasis, airspace disease or chronic interstitial changes.  Recommend  further evaluation with nonemergent chest CT.

## 2024-03-06 NOTE — TELEPHONE ENCOUNTER
Spoke with patient and notified per Dr. Chan's results message   Please call pt on his cell (his wife has our office number blocked) and let him know that his CXR was read as possible pneumonia.  Radiology is recommending a CT of the chest and I have ordered it. I am also prescribing another antibiotic Levaquin to be taken once a day for 5 days. Thank you.

## 2024-03-11 ENCOUNTER — HOSPITAL ENCOUNTER (OUTPATIENT)
Dept: CT IMAGING | Age: 84
Discharge: HOME | End: 2024-03-11
Payer: MEDICARE

## 2024-03-11 DIAGNOSIS — R93.89 ABNORMAL CHEST X-RAY: ICD-10-CM

## 2024-03-11 DIAGNOSIS — J18.9 PNEUMONIA OF LEFT LOWER LOBE DUE TO INFECTIOUS ORGANISM: ICD-10-CM

## 2024-03-11 PROCEDURE — 71250 CT THORAX DX C-: CPT

## 2024-03-19 ENCOUNTER — OFFICE VISIT (OUTPATIENT)
Dept: FAMILY MEDICINE CLINIC | Age: 84
End: 2024-03-19
Payer: MEDICARE

## 2024-03-19 VITALS
RESPIRATION RATE: 16 BRPM | DIASTOLIC BLOOD PRESSURE: 69 MMHG | HEART RATE: 61 BPM | TEMPERATURE: 97.1 F | OXYGEN SATURATION: 94 % | SYSTOLIC BLOOD PRESSURE: 125 MMHG | WEIGHT: 206.4 LBS | BODY MASS INDEX: 32.23 KG/M2

## 2024-03-19 DIAGNOSIS — I10 ESSENTIAL HYPERTENSION, BENIGN: ICD-10-CM

## 2024-03-19 DIAGNOSIS — Z87.01 HISTORY OF PNEUMONIA: ICD-10-CM

## 2024-03-19 DIAGNOSIS — F41.9 ANXIETY: Primary | ICD-10-CM

## 2024-03-19 DIAGNOSIS — R05.8 PRODUCTIVE COUGH: ICD-10-CM
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
## 2024-03-19 PROCEDURE — 3078F DIAST BP <80 MM HG: CPT | Performed by: FAMILY MEDICINE

## 2024-03-19 PROCEDURE — 1123F ACP DISCUSS/DSCN MKR DOCD: CPT | Performed by: FAMILY MEDICINE

## 2024-03-19 PROCEDURE — 99214 OFFICE O/P EST MOD 30 MIN: CPT | Performed by: FAMILY MEDICINE

## 2024-03-19 PROCEDURE — 3074F SYST BP LT 130 MM HG: CPT | Performed by: FAMILY MEDICINE

## 2024-03-19 RX ORDER — AZITHROMYCIN 250 MG/1
250 TABLET, FILM COATED ORAL DAILY
Qty: 1 PACKET | Refills: 0 | Status: SHIPPED | OUTPATIENT
Start: 2024-03-19

## 2024-03-19 NOTE — PROGRESS NOTES
3/19/2024    This is a 83 y.o. male   Chief Complaint   Patient presents with    Mental Health Problem     Anxiety    .    HPI  Pt presents today for the following:    Anxiety: Currently taking BuSpar 5 mg 2 tablets in the morning, 1 tablet in the afternoon, Lexapro 20 mg daily, and trazodone 50 mg at night. Pt's wife has been hospitalized since 03/13/24. States he visits her daily and 6 days she was doing very well. Pt received call for [facility] that they setting up a Probate Case in 2 days,told that they will take her social security and that pt has to sell the house. States that wife wants to come home and pt told her she can come home. States that he doing fine with anxiety.    HTN: Taking carvedilol 6.25 mg twice daily and losartan hydrochlorothiazide 100-25 mg daily, today's blood pressure 125/69    States that he is still coughing up colored phlegm (yellowish/green), also admits to chronic nasal drainage (clear)    Also admits to falling 2 weeks ago and hitting head on concrete, Denies dizziness or LOC    Past Medical History:   Diagnosis Date    CAD (coronary artery disease)     Diabetes (HCC)     Stroke (HCC)        Office Visit on 01/30/2024   Component Date Value Ref Range Status    Hemoglobin A1C 01/30/2024 6.6  % Final       Review of Systems   Psychiatric/Behavioral:  The patient is nervous/anxious.        /69 (Site: Left Upper Arm, Position: Sitting, Cuff Size: Large Adult)   Pulse 61   Temp 97.1 °F (36.2 °C) (Temporal)   Resp 16   Wt 93.6 kg (206 lb 6.4 oz)   SpO2 94%   BMI 32.23 kg/m²     Physical Exam  Vitals reviewed.   Constitutional:       Appearance: Normal appearance. He is well-developed.   HENT:      Head: Normocephalic and atraumatic.   Eyes:      Pupils: Pupils are equal, round, and reactive to light.   Cardiovascular:      Rate and Rhythm: Normal rate and regular rhythm.      Heart sounds: Normal heart sounds. No murmur heard.  Pulmonary:      Effort: Pulmonary effort

## 2024-03-21 DIAGNOSIS — I10 ESSENTIAL HYPERTENSION, BENIGN: ICD-10-CM

## 2024-03-21 RX ORDER — LOSARTAN POTASSIUM AND HYDROCHLOROTHIAZIDE 25; 100 MG/1; MG/1
1 TABLET ORAL DAILY
Qty: 90 TABLET | Refills: 1 | Status: SHIPPED | OUTPATIENT
Start: 2024-03-21

## 2024-03-21 NOTE — TELEPHONE ENCOUNTER
3/19/2024    Future Appointments   Date Time Provider Department Center   5/21/2024  2:20 PM Alexis Chan, DO [facility] - [facility]

## 2024-04-11 ENCOUNTER — APPOINTMENT (OUTPATIENT)
Dept: CT IMAGING | Age: 84
End: 2024-04-11
Payer: MEDICARE

## 2024-04-11 ENCOUNTER — APPOINTMENT (OUTPATIENT)
Dept: GENERAL RADIOLOGY | Age: 84
End: 2024-04-11
Payer: MEDICARE

## 2024-04-11 ENCOUNTER — HOSPITAL ENCOUNTER (EMERGENCY)
Age: 84
Discharge: HOME | End: 2024-04-11
Payer: MEDICARE

## 2024-04-11 VITALS
WEIGHT: 206 LBS | HEIGHT: 67 IN | DIASTOLIC BLOOD PRESSURE: 75 MMHG | BODY MASS INDEX: 32.33 KG/M2 | RESPIRATION RATE: 18 BRPM | TEMPERATURE: 97.9 F | OXYGEN SATURATION: 91 % | SYSTOLIC BLOOD PRESSURE: 153 MMHG | HEART RATE: 56 BPM

## 2024-04-11 DIAGNOSIS — S09.90XA INJURY OF HEAD, INITIAL ENCOUNTER: ICD-10-CM
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
## 2024-04-11 DIAGNOSIS — S00.83XA CONTUSION OF FACE, INITIAL ENCOUNTER: ICD-10-CM

## 2024-04-11 DIAGNOSIS — S61.411A LACERATION OF RIGHT HAND WITHOUT FOREIGN BODY, INITIAL ENCOUNTER: Primary | ICD-10-CM

## 2024-04-11 PROCEDURE — 12002 RPR S/N/AX/GEN/TRNK2.6-7.5CM: CPT

## 2024-04-11 PROCEDURE — 73130 X-RAY EXAM OF HAND: CPT

## 2024-04-11 PROCEDURE — 70450 CT HEAD/BRAIN W/O DYE: CPT

## 2024-04-11 PROCEDURE — 99284 EMERGENCY DEPT VISIT MOD MDM: CPT

## 2024-04-11 PROCEDURE — 6370000000 HC RX 637 (ALT 250 FOR IP): Performed by: NURSE PRACTITIONER

## 2024-04-11 RX ORDER — ACETAMINOPHEN 500 MG
1000 TABLET ORAL ONCE
Status: COMPLETED | OUTPATIENT
Start: 2024-04-11 | End: 2024-04-11

## 2024-04-11 RX ADMIN — ACETAMINOPHEN 1000 MG: 500 TABLET ORAL at 17:43

## 2024-04-11 ASSESSMENT — PAIN DESCRIPTION - PAIN TYPE: TYPE: ACUTE PAIN

## 2024-04-11 ASSESSMENT — PAIN SCALES - GENERAL: PAINLEVEL_OUTOF10: 7

## 2024-04-11 ASSESSMENT — PAIN DESCRIPTION - ORIENTATION: ORIENTATION: RIGHT

## 2024-04-11 ASSESSMENT — PAIN DESCRIPTION - LOCATION: LOCATION: HAND

## 2024-04-11 ASSESSMENT — PAIN - FUNCTIONAL ASSESSMENT: PAIN_FUNCTIONAL_ASSESSMENT: 0-10

## 2024-04-11 ASSESSMENT — PAIN DESCRIPTION - DESCRIPTORS: DESCRIPTORS: ACHING;THROBBING
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
## 2024-04-11 NOTE — ED NOTES
Patient right hand laceration cleaned with normal saline and hibiclens.  Wound then irrigated with 100 mL of NS.  Patient tolerated well. Wound prepared for CNP viewing.

## 2024-04-22 ENCOUNTER — HOSPITAL ENCOUNTER (EMERGENCY)
Age: 84
Discharge: HOME | End: 2024-04-22

## 2024-05-04 ENCOUNTER — HOSPITAL ENCOUNTER (EMERGENCY)
Age: 84
Discharge: HOME | End: 2024-05-04
Payer: MEDICARE

## 2024-05-04 ENCOUNTER — APPOINTMENT (OUTPATIENT)
Dept: GENERAL RADIOLOGY | Age: 84
End: 2024-05-04
Payer: MEDICARE

## 2024-05-04 VITALS
RESPIRATION RATE: 17 BRPM | HEART RATE: 46 BPM | TEMPERATURE: 97.7 F | BODY MASS INDEX: 31.55 KG/M2 | SYSTOLIC BLOOD PRESSURE: 124 MMHG | WEIGHT: 201 LBS | HEIGHT: 67 IN | DIASTOLIC BLOOD PRESSURE: 86 MMHG | OXYGEN SATURATION: 93 %

## 2024-05-04 DIAGNOSIS — M25.552 LEFT HIP PAIN: Primary | ICD-10-CM
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
## 2024-05-04 PROCEDURE — 6370000000 HC RX 637 (ALT 250 FOR IP): Performed by: PHYSICIAN ASSISTANT

## 2024-05-04 PROCEDURE — 99284 EMERGENCY DEPT VISIT MOD MDM: CPT

## 2024-05-04 PROCEDURE — 73552 X-RAY EXAM OF FEMUR 2/>: CPT

## 2024-05-04 PROCEDURE — 72170 X-RAY EXAM OF PELVIS: CPT

## 2024-05-04 RX ORDER — LIDOCAINE 4 G/G
1 PATCH TOPICAL DAILY
Qty: 30 PATCH | Refills: 0 | Status: SHIPPED | OUTPATIENT
Start: 2024-05-04 | End: 2024-06-03

## 2024-05-04 RX ORDER — LIDOCAINE 4 G/G
1 PATCH TOPICAL ONCE
Status: DISCONTINUED | OUTPATIENT
Start: 2024-05-04 | End: 2024-05-04 | Stop reason: HOSPADM

## 2024-05-04 ASSESSMENT — PAIN DESCRIPTION - LOCATION
LOCATION: HIP
LOCATION: HIP

## 2024-05-04 ASSESSMENT — PAIN SCALES - GENERAL
PAINLEVEL_OUTOF10: 10
PAINLEVEL_OUTOF10: 7

## 2024-05-04 ASSESSMENT — ENCOUNTER SYMPTOMS
CONSTIPATION: 0
RHINORRHEA: 0
CHEST TIGHTNESS: 0
SINUS PAIN: 0
NAUSEA: 0
DIARRHEA: 0
SORE THROAT: 0
ABDOMINAL PAIN: 0
VOMITING: 0
COUGH: 0
EYE REDNESS: 0
SINUS PRESSURE: 0
SHORTNESS OF BREATH: 0
EYE DISCHARGE: 0

## 2024-05-04 ASSESSMENT — PAIN - FUNCTIONAL ASSESSMENT
PAIN_FUNCTIONAL_ASSESSMENT: 0-10
PAIN_FUNCTIONAL_ASSESSMENT: 0-10

## 2024-05-04 ASSESSMENT — PAIN DESCRIPTION - ORIENTATION
ORIENTATION: LEFT
ORIENTATION: LEFT

## 2024-05-04 NOTE — ED NOTES
Discharge instructions reviewed with Mr. Cormier. He verbalized understanding. Copy of discharge instructions and prescriptions given.     Mr. Cormier was discharged to home in good condition per personal vehicle. He exited the ED without difficulty.

## 2024-05-04 NOTE — ED PROVIDER NOTES
[facility]  ED  EMERGENCY DEPARTMENT ENCOUNTER        Pt Name: Yifan Cormier  MRN: [id number]  Birthdate 1940  Date of evaluation: 5/4/2024  Provider: Jessenia Guerra PA-C  PCP: Alexis Chan DO  Note Started: 11:54 AM EDT 5/4/24      WILLIAM. I have evaluated this patient.        CHIEF COMPLAINT       Chief Complaint   Patient presents with    Fall     Left leg pain after fall approx 2 weeks ago. Hip pain started yesterday     Hip Pain       HISTORY OF PRESENT ILLNESS: 1 or more Elements     History from : Patient    Limitations to history : None    Yifan Cormier is a 83 y.o. male who presents to the ER: From skilled nursing facility assisted living facility where patient presents for evaluation of left-sided back pain and left leg pain that began over the past several days worsened with weightbearing.  Patient does not remember any trauma or injury to his hip.  States surgery to this hip.  No fevers or chills.  No history of blood clots.  He has not been taking anything for his pain.    Nursing Notes were all reviewed and agreed with or any disagreements were addressed in the HPI.    REVIEW OF SYSTEMS :      Review of Systems   Constitutional:  Negative for chills and fever.   HENT: Negative.  Negative for congestion, rhinorrhea, sinus pressure, sinus pain and sore throat.    Eyes:  Negative for discharge, redness and visual disturbance.   Respiratory:  Negative for cough, chest tightness and shortness of breath.    Cardiovascular:  Negative for chest pain and palpitations.   Gastrointestinal:  Negative for abdominal pain, constipation, diarrhea, nausea and vomiting.   Genitourinary:  Negative for difficulty urinating, dysuria and frequency.   Musculoskeletal:  Positive for arthralgias and myalgias.   Skin: Negative.    Neurological: Negative.  Negative for dizziness, weakness, numbness and headaches.   Psychiatric/Behavioral: Negative.     All other systems reviewed and are

## 2024-05-04 NOTE — ED NOTES
Patient provided big gulp per verbal order. Patient educated on use of call light if needs restroom, patient verbalized understanding. Bed alarm on at this time.

## 2024-05-04 NOTE — ED NOTES
Written and verbal discharge provided to patient and family member, patient verbalizes understanding. GCS 15, no acute signs or symptoms of distress. Patient discharged at this time.

## 2024-05-04 NOTE — DISCHARGE INSTRUCTIONS
Use tylenol and lidocaine patches (sent into [facility] pharmacy) to help with pain.   Follow up with Dr Moody in 2-3 days to make sure your hip pain in improving.

## 2024-05-07 DIAGNOSIS — I10 ESSENTIAL HYPERTENSION, BENIGN: ICD-10-CM

## 2024-05-08 RX ORDER — LOSARTAN POTASSIUM AND HYDROCHLOROTHIAZIDE 25; 100 MG/1; MG/1
1 TABLET ORAL DAILY
Qty: 90 TABLET | Refills: 1 | Status: SHIPPED | OUTPATIENT
Start: 2024-05-08

## 2024-05-08 NOTE — TELEPHONE ENCOUNTER
LOV 3/19/2024    Future Appointments   Date Time Provider Department Center   5/21/2024  2:20 PM Alexis Chan DO [facility] [place] [facility]

## 2024-05-14 ENCOUNTER — HOSPITAL ENCOUNTER (OUTPATIENT)
Dept: GENERAL RADIOLOGY | Age: 84
Discharge: HOME | End: 2024-05-14
Payer: MEDICARE

## 2024-05-14 DIAGNOSIS — Z87.01 HISTORY OF PNEUMONIA: ICD-10-CM

## 2024-05-14 DIAGNOSIS — R05.8 PRODUCTIVE COUGH: ICD-10-CM

## 2024-05-14 PROCEDURE — 71046 X-RAY EXAM CHEST 2 VIEWS: CPT

## 2024-05-21 ENCOUNTER — OFFICE VISIT (OUTPATIENT)
Dept: FAMILY MEDICINE CLINIC | Age: 84
End: 2024-05-21
Payer: MEDICARE

## 2024-05-21 VITALS
WEIGHT: 197 LBS | DIASTOLIC BLOOD PRESSURE: 66 MMHG | SYSTOLIC BLOOD PRESSURE: 117 MMHG | BODY MASS INDEX: 30.85 KG/M2 | TEMPERATURE: 97.5 F | RESPIRATION RATE: 16 BRPM | OXYGEN SATURATION: 92 % | HEART RATE: 73 BPM

## 2024-05-21 DIAGNOSIS — M25.552 LEFT HIP PAIN: ICD-10-CM

## 2024-05-21 DIAGNOSIS — F41.9 ANXIETY: Primary | ICD-10-CM

## 2024-05-21 DIAGNOSIS — M79.605 LEFT LEG PAIN: ICD-10-CM

## 2024-05-21 DIAGNOSIS — L98.9 SKIN LESION OF FACE: ICD-10-CM

## 2024-05-21 PROCEDURE — 1123F ACP DISCUSS/DSCN MKR DOCD: CPT | Performed by: FAMILY MEDICINE

## 2024-05-21 PROCEDURE — 3078F DIAST BP <80 MM HG: CPT | Performed by: FAMILY MEDICINE

## 2024-05-21 PROCEDURE — 99214 OFFICE O/P EST MOD 30 MIN: CPT | Performed by: FAMILY MEDICINE

## 2024-05-21 PROCEDURE — 3074F SYST BP LT 130 MM HG: CPT | Performed by: FAMILY MEDICINE

## 2024-05-21 RX ORDER — MELOXICAM 15 MG/1
15 TABLET ORAL DAILY PRN
Qty: 30 TABLET | Refills: 0 | Status: SHIPPED | OUTPATIENT
Start: 2024-05-21

## 2024-05-21 RX ORDER — LIDOCAINE AND PRILOCAINE 25; 25 MG/G; MG/G
CREAM TOPICAL
Qty: 1 EACH | Refills: 1 | Status: SHIPPED | OUTPATIENT
Start: 2024-05-21

## 2024-05-21 NOTE — PROGRESS NOTES
5/21/2024    This is a 83 y.o. male   Chief Complaint   Patient presents with    Follow-up     Anxiety    .    HPI  Patient presents today for:    Anxiety: currently taking BuSpar 5 mg 2 tablets in the morning, 1 tablet daily, 1 tablet in the evening and Lexapro 20 mg daily.  He is also taking trazodone 50 mg at night for sleep. Wife is in SNF and pt states that this has been difficult for him. Close friend passed 2 weeks ago.     HTN: Taking carvedilol 6.25 mg twice daily and losartan hydrochlorothiazide 100-25 mg daily, today's blood pressure 117/60    Patient was seen at [facility] emergency department on May 4, 2024 for left leg pain, hip pain, and right hand laceration, after a fall approximately 2 weeks ago, x-rays were negative for fractures.  Has been taking Tylenol.    Past Medical History:   Diagnosis Date    CAD (coronary artery disease)     Diabetes (HCC)     Stroke ([facility])        Office Visit on 01/30/2024   Component Date Value Ref Range Status    Hemoglobin A1C 01/30/2024 6.6  % Final       Review of Systems   Musculoskeletal:  Negative for arthralgias.   Skin:         Positive for left forehead lesion   Psychiatric/Behavioral:  The patient is nervous/anxious.        /66 (Site: Left Upper Arm, Position: Sitting, Cuff Size: Large Adult)   Pulse 73   Temp 97.5 °F (36.4 °C) (Temporal)   Resp 16   Wt 89.4 kg (197 lb)   SpO2 92%   BMI 30.85 kg/m²     Physical Exam  Vitals reviewed.   Skin:     Comments: 0.5 cm flesh colored lesion above left eyebrow   Psychiatric:         Mood and Affect: Mood normal.         Behavior: Behavior normal.         Thought Content: Thought content normal.         Judgment: Judgment normal.         Plan   Diagnosis Orders   1. Anxiety  Stable, continue Buspar 5 mg 2 tablets in the morning, 1 tablet daily, 1 tablet in the evening, Lexapro 20 mg daily and Trazodone 50 mg at night.       2. Skin lesion of face  External Referral To Dermatology      3. Left

## 2024-05-28 RX ORDER — CARVEDILOL 6.25 MG/1
6.25 TABLET ORAL 2 TIMES DAILY WITH MEALS
Qty: 180 TABLET | Refills: 1 | Status: SHIPPED | OUTPATIENT
Start: 2024-05-28

## 2024-05-28 NOTE — TELEPHONE ENCOUNTER
LOV 5/21/2024  Future Appointments   Date Time Provider Department Center   8/21/2024  3:00 PM Alexis Chan DO [facility] [place] [facility]

## 2024-06-05 ENCOUNTER — TELEPHONE (OUTPATIENT)
Dept: FAMILY MEDICINE CLINIC | Age: 84
End: 2024-06-05

## 2024-06-05 DIAGNOSIS — E78.00 PURE HYPERCHOLESTEROLEMIA: ICD-10-CM

## 2024-06-05 DIAGNOSIS — L98.9 SKIN LESION OF FACE: Primary | ICD-10-CM

## 2024-06-05 RX ORDER — ATORVASTATIN CALCIUM 80 MG/1
80 TABLET, FILM COATED ORAL DAILY
Qty: 90 TABLET | Refills: 1 | Status: SHIPPED | OUTPATIENT
Start: 2024-06-05

## 2024-06-05 NOTE — TELEPHONE ENCOUNTER
I referred patient to [facility] here in [place].  Please call [place] to verify that they are no longer taking new patients as this is the first I have heard this.  Thank you

## 2024-06-05 NOTE — TELEPHONE ENCOUNTER
Patient needs a new referral for dermatology. The referral  referred him to does not take new patients.  Please call patient when referral is placed.  Pt # [phone]

## 2024-06-10 DIAGNOSIS — F51.01 PRIMARY INSOMNIA: ICD-10-CM

## 2024-06-10 RX ORDER — TRAZODONE HYDROCHLORIDE 50 MG/1
TABLET ORAL
Qty: 15 TABLET | Refills: 0 | Status: SHIPPED | OUTPATIENT
Start: 2024-06-10

## 2024-06-10 NOTE — TELEPHONE ENCOUNTER
Future Appointments   Date Time Provider Department Center   8/21/2024  3:00 PM Alexis Chan, DO [facility] [place] - [facility]     LOV 5/21/2024

## 2024-08-05 DIAGNOSIS — F51.01 PRIMARY INSOMNIA: ICD-10-CM

## 2024-08-05 DIAGNOSIS — E03.9 HYPOTHYROIDISM, UNSPECIFIED TYPE: ICD-10-CM

## 2024-08-05 RX ORDER — TRAZODONE HYDROCHLORIDE 50 MG/1
TABLET ORAL
Qty: 45 TABLET | Refills: 0 | Status: SHIPPED | OUTPATIENT
Start: 2024-08-05 | End: 2024-08-09 | Stop reason: SDUPTHER

## 2024-08-05 RX ORDER — LEVOTHYROXINE SODIUM 0.07 MG/1
75 TABLET ORAL DAILY
Qty: 90 TABLET | Refills: 1 | Status: SHIPPED | OUTPATIENT
Start: 2024-08-05

## 2024-08-05 NOTE — TELEPHONE ENCOUNTER
LOV 5/21/2024  Future Appointments   Date Time Provider Department Center   8/21/2024  3:00 PM Alexis Chan DO [facility] DEP

## 2024-08-07 DIAGNOSIS — F51.01 PRIMARY INSOMNIA: ICD-10-CM

## 2024-08-07 RX ORDER — TRAZODONE HYDROCHLORIDE 50 MG/1
TABLET, FILM COATED ORAL
Qty: 45 TABLET | Refills: 0 | OUTPATIENT
Start: 2024-08-07

## 2024-08-07 NOTE — TELEPHONE ENCOUNTER
DUPLICATE    5/21/2024    Future Appointments   Date Time Provider Department Center   8/21/2024  3:00 PM Alexis Chan DO [facility] DEP

## 2024-08-09 DIAGNOSIS — F51.01 PRIMARY INSOMNIA: ICD-10-CM

## 2024-08-09 RX ORDER — TRAZODONE HYDROCHLORIDE 50 MG/1
50 TABLET ORAL NIGHTLY
Qty: 30 TABLET | Refills: 1 | Status: SHIPPED | OUTPATIENT
Start: 2024-08-09

## 2024-08-10 DIAGNOSIS — F51.01 PRIMARY INSOMNIA: ICD-10-CM

## 2024-08-12 DIAGNOSIS — M79.605 LEFT LEG PAIN: ICD-10-CM

## 2024-08-12 DIAGNOSIS — M25.552 LEFT HIP PAIN: ICD-10-CM

## 2024-08-12 RX ORDER — MELOXICAM 15 MG/1
15 TABLET ORAL DAILY PRN
Qty: 30 TABLET | Refills: 2 | Status: SHIPPED | OUTPATIENT
Start: 2024-08-12

## 2024-08-12 RX ORDER — TRAZODONE HYDROCHLORIDE 50 MG/1
TABLET, FILM COATED ORAL
Qty: 45 TABLET | Refills: 2 | OUTPATIENT
Start: 2024-08-12

## 2024-08-12 NOTE — TELEPHONE ENCOUNTER
LOV 5/21/2024  Future Appointments   Date Time Provider Department Center   8/21/2024  3:00 PM Alexis Chan DO [facility] ECC DEP        General

## 2024-08-13 ENCOUNTER — OFFICE VISIT (OUTPATIENT)
Dept: FAMILY MEDICINE CLINIC | Age: 84
End: 2024-08-13
Payer: MEDICARE

## 2024-08-13 VITALS
HEART RATE: 65 BPM | OXYGEN SATURATION: 92 % | RESPIRATION RATE: 16 BRPM | TEMPERATURE: 97.3 F | DIASTOLIC BLOOD PRESSURE: 72 MMHG | WEIGHT: 197 LBS | SYSTOLIC BLOOD PRESSURE: 110 MMHG | BODY MASS INDEX: 30.85 KG/M2

## 2024-08-13 DIAGNOSIS — I10 ESSENTIAL HYPERTENSION, BENIGN: ICD-10-CM

## 2024-08-13 DIAGNOSIS — R09.89 CHEST CONGESTION: ICD-10-CM

## 2024-08-13 DIAGNOSIS — J01.90 ACUTE SINUSITIS, RECURRENCE NOT SPECIFIED, UNSPECIFIED LOCATION: Primary | ICD-10-CM

## 2024-08-13 DIAGNOSIS — J06.9 UPPER RESPIRATORY TRACT INFECTION, UNSPECIFIED TYPE: ICD-10-CM

## 2024-08-13 LAB
Lab: NORMAL
PERFORMING INSTRUMENT: NORMAL
QC PASS/FAIL: NORMAL
SARS-COV-2, POC: NORMAL

## 2024-08-13 PROCEDURE — 1123F ACP DISCUSS/DSCN MKR DOCD: CPT | Performed by: STUDENT IN AN ORGANIZED HEALTH CARE EDUCATION/TRAINING PROGRAM

## 2024-08-13 PROCEDURE — 3078F DIAST BP <80 MM HG: CPT | Performed by: STUDENT IN AN ORGANIZED HEALTH CARE EDUCATION/TRAINING PROGRAM

## 2024-08-13 PROCEDURE — 87426 SARSCOV CORONAVIRUS AG IA: CPT | Performed by: STUDENT IN AN ORGANIZED HEALTH CARE EDUCATION/TRAINING PROGRAM

## 2024-08-13 PROCEDURE — 3074F SYST BP LT 130 MM HG: CPT | Performed by: STUDENT IN AN ORGANIZED HEALTH CARE EDUCATION/TRAINING PROGRAM

## 2024-08-13 PROCEDURE — 99213 OFFICE O/P EST LOW 20 MIN: CPT | Performed by: STUDENT IN AN ORGANIZED HEALTH CARE EDUCATION/TRAINING PROGRAM

## 2024-08-13 RX ORDER — DOXYCYCLINE HYCLATE 100 MG
100 TABLET ORAL 2 TIMES DAILY
Qty: 14 TABLET | Refills: 0 | Status: CANCELLED | OUTPATIENT
Start: 2024-08-13 | End: 2024-08-20

## 2024-08-13 RX ORDER — FLUTICASONE PROPIONATE 50 MCG
1 SPRAY, SUSPENSION (ML) NASAL DAILY
Qty: 16 G | Refills: 0 | Status: SHIPPED | OUTPATIENT
Start: 2024-08-13

## 2024-08-13 RX ORDER — CETIRIZINE HYDROCHLORIDE 5 MG/1
5 TABLET ORAL DAILY
Qty: 30 TABLET | Refills: 0 | Status: SHIPPED | OUTPATIENT
Start: 2024-08-13

## 2024-08-13 RX ORDER — DOXYCYCLINE HYCLATE 100 MG
100 TABLET ORAL 2 TIMES DAILY
Qty: 14 TABLET | Refills: 0 | Status: SHIPPED | OUTPATIENT
Start: 2024-08-13 | End: 2024-08-20

## 2024-08-13 SDOH — ECONOMIC STABILITY: FOOD INSECURITY: WITHIN THE PAST 12 MONTHS, YOU WORRIED THAT YOUR FOOD WOULD RUN OUT BEFORE YOU GOT MONEY TO BUY MORE.: NEVER TRUE

## 2024-08-13 SDOH — ECONOMIC STABILITY: FOOD INSECURITY: WITHIN THE PAST 12 MONTHS, THE FOOD YOU BOUGHT JUST DIDN'T LAST AND YOU DIDN'T HAVE MONEY TO GET MORE.: NEVER TRUE

## 2024-08-13 SDOH — ECONOMIC STABILITY: INCOME INSECURITY: HOW HARD IS IT FOR YOU TO PAY FOR THE VERY BASICS LIKE FOOD, HOUSING, MEDICAL CARE, AND HEATING?: NOT VERY HARD

## 2024-08-13 ASSESSMENT — PATIENT HEALTH QUESTIONNAIRE - PHQ9
6. FEELING BAD ABOUT YOURSELF - OR THAT YOU ARE A FAILURE OR HAVE LET YOURSELF OR YOUR FAMILY DOWN: NOT AT ALL
5. POOR APPETITE OR OVEREATING: NOT AT ALL
10. IF YOU CHECKED OFF ANY PROBLEMS, HOW DIFFICULT HAVE THESE PROBLEMS MADE IT FOR YOU TO DO YOUR WORK, TAKE CARE OF THINGS AT HOME, OR GET ALONG WITH OTHER PEOPLE: NOT DIFFICULT AT ALL
9. THOUGHTS THAT YOU WOULD BE BETTER OFF DEAD, OR OF HURTING YOURSELF: NOT AT ALL
8. MOVING OR SPEAKING SO SLOWLY THAT OTHER PEOPLE COULD HAVE NOTICED. OR THE OPPOSITE, BEING SO FIGETY OR RESTLESS THAT YOU HAVE BEEN MOVING AROUND A LOT MORE THAN USUAL: NOT AT ALL
1. LITTLE INTEREST OR PLEASURE IN DOING THINGS: NOT AT ALL
SUM OF ALL RESPONSES TO PHQ QUESTIONS 1-9: 0
SUM OF ALL RESPONSES TO PHQ9 QUESTIONS 1 & 2: 0
4. FEELING TIRED OR HAVING LITTLE ENERGY: NOT AT ALL
SUM OF ALL RESPONSES TO PHQ QUESTIONS 1-9: 0
7. TROUBLE CONCENTRATING ON THINGS, SUCH AS READING THE NEWSPAPER OR WATCHING TELEVISION: NOT AT ALL
2. FEELING DOWN, DEPRESSED OR HOPELESS: NOT AT ALL
SUM OF ALL RESPONSES TO PHQ QUESTIONS 1-9: 0
SUM OF ALL RESPONSES TO PHQ QUESTIONS 1-9: 0
3. TROUBLE FALLING OR STAYING ASLEEP: NOT AT ALL

## 2024-08-13 NOTE — PATIENT INSTRUCTIONS
Tested Negative for COVID-19   Flonase one spray each nostril daily    daily Allergy medication - Cetrizine     If still having greeen sputum or fevers after 1 week try antibiotic   doxycycline 100 mg twice daily

## 2024-08-13 NOTE — PROGRESS NOTES
[facility]  [address]  [address]  [place], [place] [address]  Tel: [phone]      2024   SUBJECTIVE/OBJECTIVE  HPI    Yifan Cormier (:  1940) is a 83 y.o. male, here for evaluation of the following medical concerns:  Chief Complaint   Patient presents with    Cough   Patient is a 83 y.o. male  has a past medical history of CAD (coronary artery disease), Diabetes ([facility]), and Stroke ([facility]). who presents with sputum production.  Patient is hard of hearing.  Difficulty obtaining history.    Sputum production   Presents with chronic sputum production.  Patient presented with napkin of yellow mucus.  States this is a chronic problem for the last 4 to 5 months. Does not take any allergy medications.   Endorses nasal congestion. More in the morning. Shortness of breath with going up the stairs.    Associated symptoms include congestion, coughing and a sore throat. Pertinent negatives include no abdominal pain, chest pain, ear pain, headaches, nausea, plugged ear sensation, rhinorrhea, sinus pain, vomiting or wheezing. He has tried nothing for the symptoms. The treatment provided mild relief.             sents today for the following:    Anxiety: Currently taking BuSpar 5 mg 2 tablets in the morning, 1 tablet in the afternoon, Lexapro 20 mg daily, and trazodone 50 mg at night.      Hypertension: On carvedilol 6.25 mg twice daily and losartan hydrochlorothiazide 100-25 mg daily, today's blood pressure 125/69        Review of Systems   HENT:  Positive for congestion and sore throat. Negative for ear pain, rhinorrhea and sinus pain.    Respiratory:  Positive for cough. Negative for wheezing.    Cardiovascular:  Negative for chest pain.   Gastrointestinal:  Negative for abdominal pain, nausea and vomiting.   Neurological:  Negative for headaches.       Prior to Visit Medications    Medication Sig Taking? Authorizing Provider   fluticasone (FLONASE) 50 MCG/ACT nasal spray 1 spray by

## 2024-08-21 ENCOUNTER — OFFICE VISIT (OUTPATIENT)
Dept: FAMILY MEDICINE CLINIC | Age: 84
End: 2024-08-21
Payer: MEDICARE

## 2024-08-21 VITALS
SYSTOLIC BLOOD PRESSURE: 130 MMHG | BODY MASS INDEX: 29.44 KG/M2 | WEIGHT: 188 LBS | TEMPERATURE: 97.4 F | RESPIRATION RATE: 16 BRPM | OXYGEN SATURATION: 93 % | DIASTOLIC BLOOD PRESSURE: 66 MMHG | HEART RATE: 68 BPM

## 2024-08-21 DIAGNOSIS — Z12.5 PROSTATE CANCER SCREENING: ICD-10-CM

## 2024-08-21 DIAGNOSIS — F41.9 ANXIETY: ICD-10-CM

## 2024-08-21 DIAGNOSIS — E11.9 TYPE 2 DIABETES MELLITUS WITHOUT COMPLICATION, WITHOUT LONG-TERM CURRENT USE OF INSULIN (HCC): Primary | ICD-10-CM

## 2024-08-21 DIAGNOSIS — I10 ESSENTIAL HYPERTENSION, BENIGN: ICD-10-CM

## 2024-08-21 LAB — HBA1C MFR BLD: 6.2 %

## 2024-08-21 PROCEDURE — 3078F DIAST BP <80 MM HG: CPT | Performed by: FAMILY MEDICINE

## 2024-08-21 PROCEDURE — 1123F ACP DISCUSS/DSCN MKR DOCD: CPT | Performed by: FAMILY MEDICINE

## 2024-08-21 PROCEDURE — 3044F HG A1C LEVEL LT 7.0%: CPT | Performed by: FAMILY MEDICINE

## 2024-08-21 PROCEDURE — 83036 HEMOGLOBIN GLYCOSYLATED A1C: CPT | Performed by: FAMILY MEDICINE

## 2024-08-21 PROCEDURE — 99214 OFFICE O/P EST MOD 30 MIN: CPT | Performed by: FAMILY MEDICINE

## 2024-08-21 PROCEDURE — 3075F SYST BP GE 130 - 139MM HG: CPT | Performed by: FAMILY MEDICINE

## 2024-08-21 SDOH — ECONOMIC STABILITY: FOOD INSECURITY: WITHIN THE PAST 12 MONTHS, THE FOOD YOU BOUGHT JUST DIDN'T LAST AND YOU DIDN'T HAVE MONEY TO GET MORE.: NEVER TRUE

## 2024-08-21 SDOH — ECONOMIC STABILITY: FOOD INSECURITY: WITHIN THE PAST 12 MONTHS, YOU WORRIED THAT YOUR FOOD WOULD RUN OUT BEFORE YOU GOT MONEY TO BUY MORE.: NEVER TRUE

## 2024-08-21 SDOH — ECONOMIC STABILITY: INCOME INSECURITY: HOW HARD IS IT FOR YOU TO PAY FOR THE VERY BASICS LIKE FOOD, HOUSING, MEDICAL CARE, AND HEATING?: NOT HARD AT ALL

## 2024-08-21 ASSESSMENT — PATIENT HEALTH QUESTIONNAIRE - PHQ9
SUM OF ALL RESPONSES TO PHQ QUESTIONS 1-9: 4
6. FEELING BAD ABOUT YOURSELF - OR THAT YOU ARE A FAILURE OR HAVE LET YOURSELF OR YOUR FAMILY DOWN: NOT AT ALL
8. MOVING OR SPEAKING SO SLOWLY THAT OTHER PEOPLE COULD HAVE NOTICED. OR THE OPPOSITE, BEING SO FIGETY OR RESTLESS THAT YOU HAVE BEEN MOVING AROUND A LOT MORE THAN USUAL: NOT AT ALL
SUM OF ALL RESPONSES TO PHQ QUESTIONS 1-9: 4
10. IF YOU CHECKED OFF ANY PROBLEMS, HOW DIFFICULT HAVE THESE PROBLEMS MADE IT FOR YOU TO DO YOUR WORK, TAKE CARE OF THINGS AT HOME, OR GET ALONG WITH OTHER PEOPLE: VERY DIFFICULT
7. TROUBLE CONCENTRATING ON THINGS, SUCH AS READING THE NEWSPAPER OR WATCHING TELEVISION: NOT AT ALL
SUM OF ALL RESPONSES TO PHQ QUESTIONS 1-9: 4
SUM OF ALL RESPONSES TO PHQ QUESTIONS 1-9: 4
SUM OF ALL RESPONSES TO PHQ9 QUESTIONS 1 & 2: 4
4. FEELING TIRED OR HAVING LITTLE ENERGY: NOT AT ALL
3. TROUBLE FALLING OR STAYING ASLEEP: NOT AT ALL
1. LITTLE INTEREST OR PLEASURE IN DOING THINGS: SEVERAL DAYS
9. THOUGHTS THAT YOU WOULD BE BETTER OFF DEAD, OR OF HURTING YOURSELF: NOT AT ALL
2. FEELING DOWN, DEPRESSED OR HOPELESS: NEARLY EVERY DAY
5. POOR APPETITE OR OVEREATING: NOT AT ALL

## 2024-08-21 NOTE — PROGRESS NOTES
8/21/2024    This is a 83 y.o. male   Chief Complaint   Patient presents with    3 Month Follow-Up    Anxiety    Diabetes     Has not had A1c since Jan   .    HPI  Pt presents today for the following:    T2DM: Taking no prescription diabetic medication    Last Eye Exam: 2024 - negative for DR  Last Foot Exam:  Last microalbumin: Will do at next visit    Today's A1C = 6.2    Anxiety: Taking BuSpar 5 mg 2 tablets in the morning 1 tablet in the afternoon, and 1 tablet in the evening, and Lexapro 20 mg daily.  Also takes trazodone 50 mg at night for sleep.    States that his [facility] is at [facility], states that his depression has worsened, difficult to see his wife b/c she is angry at him, saw her this morning, when asked if he could care for her at home he states \"yes and no\". States that Trazodone has helped at a full 50 mg dose which calms him down and helps both anxiety of depression but feels he sleeps too long.    Has been taking Buspar 5 mg 2 tabs in the AM, 2 tabs in the afternoon, and 2 tabs in the evening     Past Medical History:   Diagnosis Date    CAD (coronary artery disease)     Diabetes (HCC)     Stroke (HCC)        Office Visit on 08/13/2024   Component Date Value Ref Range Status    SARS-COV-2, POC 08/13/2024 Not-Detected  Not Detected Final    Lot Number 08/13/2024 [id number]   Final    QC Pass/Fail 08/13/2024 Pass   Final    Performing Instrument 08/13/2024 BD Veritor   Final       Review of Systems   Psychiatric/Behavioral:  Positive for dysphoric mood. The patient is nervous/anxious.        /66 (Site: Left Upper Arm, Position: Sitting)   Pulse 68   Temp 97.4 °F (36.3 °C) (Temporal)   Resp 16   Wt 85.3 kg (188 lb)   SpO2 93%   BMI 29.44 kg/m²     Physical Exam  Vitals reviewed.   Constitutional:       Appearance: Normal appearance. He is well-developed.   HENT:      Head: Normocephalic and atraumatic.   Eyes:      Pupils: Pupils are equal, round, and reactive to light.

## 2024-10-09 ENCOUNTER — TELEPHONE (OUTPATIENT)
Dept: FAMILY MEDICINE CLINIC | Age: 84
End: 2024-10-09

## 2024-10-09 NOTE — TELEPHONE ENCOUNTER
Attempted to call patient, no message left. We received a form from [facility] for Comprehensive Neurology testing. We do not feel this is a legitimate company/test. Is patient aware of what this test is? Why does the patient feel he needs this testing?

## 2024-10-15 NOTE — TELEPHONE ENCOUNTER
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  I advised we need confirmation from patient that he is aware of the neurology testing before signing form

## 2024-10-16 NOTE — TELEPHONE ENCOUNTER
I spoke with Alex and told him that the patient would need to call us before we signed the orders.

## 2024-11-05 DIAGNOSIS — F41.9 ANXIETY: ICD-10-CM

## 2024-11-05 DIAGNOSIS — F51.01 PRIMARY INSOMNIA: ICD-10-CM

## 2024-11-05 RX ORDER — TRAZODONE HYDROCHLORIDE 50 MG/1
50 TABLET, FILM COATED ORAL NIGHTLY
Qty: 90 TABLET | Refills: 0 | Status: SHIPPED | OUTPATIENT
Start: 2024-11-05

## 2024-11-05 NOTE — TELEPHONE ENCOUNTER
LOV 8/21/2024    Future Appointments   Date Time Provider Department Center   11/21/2024  3:00 PM Alexis Chan DO [facility] DEP

## 2024-11-06 ENCOUNTER — OFFICE VISIT (OUTPATIENT)
Dept: FAMILY MEDICINE CLINIC | Age: 84
End: 2024-11-06

## 2024-11-06 ENCOUNTER — TELEPHONE (OUTPATIENT)
Dept: FAMILY MEDICINE CLINIC | Age: 84
End: 2024-11-06

## 2024-11-06 VITALS
SYSTOLIC BLOOD PRESSURE: 136 MMHG | DIASTOLIC BLOOD PRESSURE: 62 MMHG | RESPIRATION RATE: 16 BRPM | HEART RATE: 62 BPM | TEMPERATURE: 96.9 F | BODY MASS INDEX: 28.88 KG/M2 | WEIGHT: 184.4 LBS | OXYGEN SATURATION: 90 %

## 2024-11-06 DIAGNOSIS — J01.90 ACUTE NON-RECURRENT SINUSITIS, UNSPECIFIED LOCATION: Primary | ICD-10-CM

## 2024-11-06 RX ORDER — AZITHROMYCIN 250 MG/1
250 TABLET, FILM COATED ORAL DAILY
Qty: 1 PACKET | Refills: 0 | Status: CANCELLED | OUTPATIENT
Start: 2024-11-06

## 2024-11-06 RX ORDER — LIDOCAINE HYDROCHLORIDE 20 MG/ML
15 SOLUTION OROPHARYNGEAL PRN
Qty: 1 EACH | Refills: 0 | Status: SHIPPED | OUTPATIENT
Start: 2024-11-06

## 2024-11-06 RX ORDER — BUSPIRONE HYDROCHLORIDE 5 MG/1
TABLET ORAL
Qty: 120 TABLET | Refills: 5 | Status: SHIPPED | OUTPATIENT
Start: 2024-11-06

## 2024-11-06 RX ORDER — FLUTICASONE PROPIONATE 50 MCG
1 SPRAY, SUSPENSION (ML) NASAL DAILY
Qty: 1 EACH | Refills: 0 | Status: SHIPPED | OUTPATIENT
Start: 2024-11-06 | End: 2024-11-13

## 2024-11-06 RX ORDER — DOXYCYCLINE HYCLATE 100 MG
100 TABLET ORAL 2 TIMES DAILY
Qty: 14 TABLET | Refills: 0 | Status: SHIPPED | OUTPATIENT
Start: 2024-11-06 | End: 2024-11-13

## 2024-11-06 ASSESSMENT — ENCOUNTER SYMPTOMS
SINUS PRESSURE: 1
COUGH: 1
SORE THROAT: 1

## 2024-11-06 NOTE — TELEPHONE ENCOUNTER
Future Appointments   Date Time Provider Department Center   11/6/2024  9:20 AM Alexis Chan DO [facility] DEP   11/21/2024  3:00 PM Alexis Chan DO [facility] E[facility] DEP             LOV 8/21/2024

## 2024-11-06 NOTE — TELEPHONE ENCOUNTER
Please call the pharmacy and let them know that I put a note that it contains only 2% viscous lidocaine.  No other ingredients.  Thank you

## 2024-11-06 NOTE — PROGRESS NOTES
11/6/2024    This is a 84 y.o. male   Chief Complaint   Patient presents with    Sinus Problem     Sinus pressure x 2 weeks       Cough     Coughing up blood mucus     Congestion   .    HPI     Pt presents today for the following:    Productive cough/sinus pressure/bloody mucus: 2 week hx, admits to sinus drainage that is causing his throat to be sore.   Past Medical History:   Diagnosis Date    CAD (coronary artery disease)     Diabetes (HCC)     Stroke (HCC)        Office Visit on 08/21/2024   Component Date Value Ref Range Status    Hemoglobin A1C 08/21/2024 6.2  % Final       Review of Systems   HENT:  Positive for postnasal drip, sinus pressure and sore throat.    Respiratory:  Positive for cough.        /62 (Site: Left Upper Arm, Position: Sitting, Cuff Size: Large Adult)   Pulse 62   Temp 96.9 °F (36.1 °C) (Temporal)   Resp 16   Wt 83.6 kg (184 lb 6.4 oz)   SpO2 90%   BMI 28.88 kg/m²     Physical Exam  Vitals reviewed.   HENT:      Mouth/Throat:      Mouth: Mucous membranes are moist.      Pharynx: Posterior oropharyngeal erythema present. No oropharyngeal exudate.   Pulmonary:      Effort: Pulmonary effort is normal.      Breath sounds: Normal breath sounds. No wheezing or rhonchi.   Psychiatric:         Mood and Affect: Mood normal.         Behavior: Behavior normal.         Thought Content: Thought content normal.         Judgment: Judgment normal.         Plan   Diagnosis Orders   1. Acute non-recurrent sinusitis, unspecified location  doxycycline hyclate (VIBRA-TABS) 100 MG tablet    fluticasone (FLONASE) 50 MCG/ACT nasal spray    Magic Mouthwash (MIRACLE MOUTHWASH)        Vicks Vaporizer, Vicks VapoRub on the chest, neck, and sinuses at night, and drink water to thin secretions.      Return if symptoms worsen or fail to improve.

## 2024-11-06 NOTE — TELEPHONE ENCOUNTER
Spoke with pharmacy. They need a prescription sent for 2% viscous lidocaine not as magic mouthwash.

## 2024-11-07 ENCOUNTER — TELEPHONE (OUTPATIENT)
Dept: FAMILY MEDICINE CLINIC | Age: 84
End: 2024-11-07

## 2024-11-07 DIAGNOSIS — J01.90 ACUTE NON-RECURRENT SINUSITIS, UNSPECIFIED LOCATION: ICD-10-CM

## 2024-11-07 DIAGNOSIS — I10 ESSENTIAL HYPERTENSION, BENIGN: ICD-10-CM

## 2024-11-07 RX ORDER — LOSARTAN POTASSIUM AND HYDROCHLOROTHIAZIDE 25; 100 MG/1; MG/1
1 TABLET ORAL DAILY
Qty: 90 TABLET | Refills: 1 | Status: SHIPPED | OUTPATIENT
Start: 2024-11-07

## 2024-11-07 RX ORDER — LIDOCAINE HYDROCHLORIDE 20 MG/ML
15 SOLUTION OROPHARYNGEAL PRN
Qty: 1 EACH | Refills: 0 | Status: CANCELLED | OUTPATIENT
Start: 2024-11-07

## 2024-11-07 NOTE — TELEPHONE ENCOUNTER
Patient said   [facility] the Magic Mouthwash (MIRACLE MOUTHWASH) [[id number]]     Can you send the prescription to   [facility] [place] [address] [address] - P [phone] - F [phone] [[id number]]

## 2024-11-07 NOTE — TELEPHONE ENCOUNTER
LOV 11/6/2024  Future Appointments   Date Time Provider Department Center   11/21/2024  3:00 PM Alexis Chan DO [facility] ECC DEP

## 2024-11-21 ENCOUNTER — OFFICE VISIT (OUTPATIENT)
Dept: FAMILY MEDICINE CLINIC | Age: 84
End: 2024-11-21

## 2024-11-21 VITALS
DIASTOLIC BLOOD PRESSURE: 67 MMHG | SYSTOLIC BLOOD PRESSURE: 99 MMHG | TEMPERATURE: 97.3 F | OXYGEN SATURATION: 91 % | RESPIRATION RATE: 16 BRPM | HEART RATE: 101 BPM | WEIGHT: 184.6 LBS | BODY MASS INDEX: 28.91 KG/M2

## 2024-11-21 DIAGNOSIS — R45.89 DEPRESSED MOOD: ICD-10-CM

## 2024-11-21 DIAGNOSIS — H53.9 VISION DISTURBANCE: ICD-10-CM

## 2024-11-21 DIAGNOSIS — I10 ESSENTIAL HYPERTENSION, BENIGN: ICD-10-CM

## 2024-11-21 DIAGNOSIS — Z00.00 MEDICARE ANNUAL WELLNESS VISIT, SUBSEQUENT: Primary | ICD-10-CM

## 2024-11-21 DIAGNOSIS — H91.93 BILATERAL HEARING LOSS, UNSPECIFIED HEARING LOSS TYPE: ICD-10-CM

## 2024-11-21 SDOH — ECONOMIC STABILITY: FOOD INSECURITY: WITHIN THE PAST 12 MONTHS, THE FOOD YOU BOUGHT JUST DIDN'T LAST AND YOU DIDN'T HAVE MONEY TO GET MORE.: SOMETIMES TRUE

## 2024-11-21 SDOH — ECONOMIC STABILITY: FOOD INSECURITY: WITHIN THE PAST 12 MONTHS, YOU WORRIED THAT YOUR FOOD WOULD RUN OUT BEFORE YOU GOT MONEY TO BUY MORE.: SOMETIMES TRUE

## 2024-11-21 SDOH — ECONOMIC STABILITY: INCOME INSECURITY: HOW HARD IS IT FOR YOU TO PAY FOR THE VERY BASICS LIKE FOOD, HOUSING, MEDICAL CARE, AND HEATING?: SOMEWHAT HARD

## 2024-11-21 ASSESSMENT — PATIENT HEALTH QUESTIONNAIRE - PHQ9
SUM OF ALL RESPONSES TO PHQ QUESTIONS 1-9: 2
SUM OF ALL RESPONSES TO PHQ9 QUESTIONS 1 & 2: 2
SUM OF ALL RESPONSES TO PHQ QUESTIONS 1-9: 2
1. LITTLE INTEREST OR PLEASURE IN DOING THINGS: SEVERAL DAYS
SUM OF ALL RESPONSES TO PHQ QUESTIONS 1-9: 2
2. FEELING DOWN, DEPRESSED OR HOPELESS: SEVERAL DAYS
SUM OF ALL RESPONSES TO PHQ QUESTIONS 1-9: 2

## 2024-11-21 NOTE — PATIENT INSTRUCTIONS
[facility] Financial Resources*  (Call [employer]/211 if need more resources.)      [employer] 211   Speak to a trained professional 24/7 who can connect you to essential community services including food, clothing, transportation, housing, utilities, employment services, childcare, and baby supplies. 211 serves nationwide.   [facility]andy for resources in [place], [facility], [place] and [facility] in [place]; [place], [place], [place], and [facility] in [place].   [facility] [employer].org/resources for resources in [place], [place], [place], [place], [place], [place], [place], [place], [facility], [place], [place], and [facility] in [place].     [employer] Financial Assistance  What they offer: Financial assistance programs that are designed to assist you in finding resources that may help pay your hospital bill. Please click on the links below to learn more about the financial assistance programs available within our regions.  Phone Number: [phone]  How to apply for the [facility] Financial Assistance Program:       Option 1: To apply for financial assistance, a patient (or their family or other provider) should fill out the Financial Assistance Application. Copies of the Financial Assistance Application and the FAP may be obtained for free by calling the [facility] Customer Service department at [phone]   Option 2: The Financial Assistance Application and policy may be obtained for free by downloading a copy from the [employer] website:  https://www.[employer]/patient-resources/financial-assistance  [place] Health Care Assurance Program  What they offer:  Patients who need hospital care, but are unable to pay for it, may be eligible for free or reduced fee care at [facility] through the Hospital Care Assurance Program (HCAP). Applications for HCAP are accepted by the hospital where care was received, and patients seeking HCAP assistance should contact their hospital’s billing department for

## 2024-11-21 NOTE — PROGRESS NOTES
Medicare Annual Wellness Visit    Yifan Cormier is here for Medicare AWV (awv)    Assessment & Plan   Medicare annual wellness visit, subsequent  Essential hypertension, benign  Depressed mood  Bilateral hearing loss, unspecified hearing loss type  -     [facility] AU[facility], [place]  Vision disturbance  -     Non [facility] - External Referral To Ophthalmology  Recommendations for Preventive Services Due: see orders and patient instructions/AVS.  Recommended screening schedule for the next 5-10 years is provided to the patient in written form: see Patient Instructions/AVS.     Return in about 3 months (around 2/21/2025) for Medication Check.     Subjective   The following acute and/or chronic problems were also addressed today:    HTN: Taking carvedilol 6.25 mg twice daily and losartan hydrochlorothiazide 100-25 mg daily, today's blood pressure 95/65  -Drinking water?    Depressed mood:  -Taking Lexapro 20 mg and trazodone 50 mg at night    Labs ordered on 08/21/2024 have not yet been.    Patient's complete Health Risk Assessment and screening values have been reviewed and are found in Flowsheets. The following problems were reviewed today and where indicated follow up appointments were made and/or referrals ordered.    Positive Risk Factor Screenings with Interventions:    Fall Risk:  Do you feel unsteady or are you worried about falling? : (!) yes  2 or more falls in past year?: no  Fall with injury in past year?: (!) yes     Interventions:    Discussed home safety and fall prevention, uses a cane most of the time            General HRA Questions:  Select all that apply: (!) Loneliness, Stress  Interventions - Loneliness:  Wife is in SNF and pt visits 1-2 x day, head nurse suggested that pt go to [place] for a few weeks as it has been hard on him.  Has 1 son in the area, states that he sees him some. Pt states that he has friends here.  Interventions - Stress:  Discussed stress

## 2024-11-26 NOTE — TELEPHONE ENCOUNTER
LOV 11/21/2024  Future Appointments   Date Time Provider Department Center   2/21/2025  1:20 PM Alexis Chan DO [facility] ECC DEP

## 2024-11-27 RX ORDER — CARVEDILOL 6.25 MG/1
6.25 TABLET ORAL 2 TIMES DAILY WITH MEALS
Qty: 180 TABLET | Refills: 1 | Status: SHIPPED | OUTPATIENT
Start: 2024-11-27

## 2024-12-03 DIAGNOSIS — E78.00 PURE HYPERCHOLESTEROLEMIA: ICD-10-CM

## 2024-12-03 RX ORDER — ATORVASTATIN CALCIUM 80 MG/1
80 TABLET, FILM COATED ORAL DAILY
Qty: 90 TABLET | Refills: 1 | Status: SHIPPED | OUTPATIENT
Start: 2024-12-03

## 2024-12-03 NOTE — TELEPHONE ENCOUNTER
LOV 11/21/2024  Future Appointments   Date Time Provider Department Center   2/21/2025  1:20 PM Alexis Chan DO [facility] E[facility] DEP

## 2024-12-19 ENCOUNTER — TELEPHONE (OUTPATIENT)
Dept: FAMILY MEDICINE CLINIC | Age: 84
End: 2024-12-19

## 2024-12-19 DIAGNOSIS — J01.90 ACUTE SINUSITIS, RECURRENCE NOT SPECIFIED, UNSPECIFIED LOCATION: ICD-10-CM

## 2024-12-19 NOTE — TELEPHONE ENCOUNTER
Patient needs a refill on Zyrtec. They need a 30 day supply.     Mail order or local pharmacy: local    Pharmacy: [facility]    Last OV: 11/21/24    Future Appointments   Date Time Provider Department Center   2/21/2025  1:20 PM Alexis Chan DO [facility] E[facility] DEP

## 2024-12-20 ENCOUNTER — OFFICE VISIT (OUTPATIENT)
Dept: FAMILY MEDICINE CLINIC | Age: 84
End: 2024-12-20
Payer: MEDICARE

## 2024-12-20 VITALS
RESPIRATION RATE: 17 BRPM | WEIGHT: 184.6 LBS | SYSTOLIC BLOOD PRESSURE: 114 MMHG | OXYGEN SATURATION: 93 % | DIASTOLIC BLOOD PRESSURE: 64 MMHG | BODY MASS INDEX: 28.91 KG/M2 | TEMPERATURE: 97.1 F | HEART RATE: 53 BPM

## 2024-12-20 DIAGNOSIS — R55 SYNCOPE, UNSPECIFIED SYNCOPE TYPE: Primary | ICD-10-CM

## 2024-12-20 DIAGNOSIS — R00.1 BRADYCARDIA BY ELECTROCARDIOGRAM: ICD-10-CM

## 2024-12-20 PROCEDURE — 1123F ACP DISCUSS/DSCN MKR DOCD: CPT | Performed by: SURGERY

## 2024-12-20 PROCEDURE — 3074F SYST BP LT 130 MM HG: CPT | Performed by: SURGERY

## 2024-12-20 PROCEDURE — 3078F DIAST BP <80 MM HG: CPT | Performed by: SURGERY

## 2024-12-20 PROCEDURE — 93000 ELECTROCARDIOGRAM COMPLETE: CPT | Performed by: SURGERY

## 2024-12-20 PROCEDURE — 99214 OFFICE O/P EST MOD 30 MIN: CPT | Performed by: SURGERY

## 2024-12-20 PROCEDURE — 1159F MED LIST DOCD IN RCRD: CPT | Performed by: SURGERY

## 2024-12-20 RX ORDER — CETIRIZINE HYDROCHLORIDE 5 MG/1
5 TABLET ORAL DAILY
Qty: 30 TABLET | Refills: 0 | Status: SHIPPED | OUTPATIENT
Start: 2024-12-20

## 2024-12-20 ASSESSMENT — ENCOUNTER SYMPTOMS: VISUAL CHANGE: 1

## 2024-12-20 NOTE — PROGRESS NOTES
12/20/2024    This is a 84 y.o. male   Chief Complaint   Patient presents with    Dizziness     6 months   .    Multiple syncopal episodes      Loss of Consciousness  This is a recurrent problem. The current episode started 1 to 4 weeks ago. The problem occurs intermittently. The problem has been gradually worsening. He lost consciousness for a period of less than 1 minute. The symptoms are aggravated by exertion, turning head, standing and normal activity. Associated symptoms include dizziness and a visual change (since had cataract removed, has problems with right). Pertinent negatives include no chest pain, light-headedness or palpitations. His past medical history is significant for CAD, DM and HTN.        Patient Active Problem List   Diagnosis    Coronary artery disease involving native heart without angina pectoris    Bilateral hearing loss    Greater trochanteric bursitis    Lumbar radiculopathy    S/P AAA repair    Controlled type 2 diabetes mellitus without complication, without long-term current use of insulin ([facility])    Reactive depression    Gastroesophageal reflux disease without esophagitis    Pure hypercholesterolemia    Dizziness    Type II or unspecified type diabetes mellitus without mention of complication, not stated as uncontrolled    Postsurgical percutaneous transluminal coronary angioplasty status    Personal history of other diseases of circulatory system    Old myocardial infarction    Mixed hyperlipidemia    PVD (peripheral vascular disease) (formerly [facility])    Headache    Essential hypertension, benign    Epistaxis    Cardiac conduction disorder    Bruit       Current Outpatient Medications   Medication Sig Dispense Refill    atorvastatin (LIPITOR) 80 MG tablet Take 1 tablet by mouth daily 90 tablet 1    carvedilol (COREG) 6.25 MG tablet Take 1 tablet by mouth 2 times daily (with meals) with meals 180 tablet 1    losartan-hydroCHLOROthiazide (HYZAAR) 100-25 MG per tablet Take 1 tablet by mouth daily

## 2025-01-29 ENCOUNTER — HOSPITAL ENCOUNTER (INPATIENT)
Age: 85
LOS: 6 days | Discharge: HOME | DRG: 884 | End: 2025-02-04
Attending: EMERGENCY MEDICINE | Admitting: PSYCHIATRY & NEUROLOGY
Payer: MEDICARE

## 2025-01-29 ENCOUNTER — APPOINTMENT (OUTPATIENT)
Dept: GENERAL RADIOLOGY | Age: 85
DRG: 884 | End: 2025-01-29
Payer: MEDICARE

## 2025-01-29 DIAGNOSIS — R45.89 DEPRESSED MOOD: ICD-10-CM

## 2025-01-29 DIAGNOSIS — R46.89 THREATENING BEHAVIOR: Primary | ICD-10-CM

## 2025-01-29 DIAGNOSIS — R45.850 HOMICIDAL BEHAVIOR: ICD-10-CM

## 2025-01-29 PROBLEM — F03.918 DEMENTIA WITH BEHAVIORAL DISTURBANCE (HCC): Status: ACTIVE | Noted: 2025-01-29

## 2025-01-29 LAB
ALBUMIN SERPL-MCNC: 3.6 G/DL (ref 3.4–5)
ALBUMIN/GLOB SERPL: 1.4 {RATIO} (ref 1.1–2.2)
ALP SERPL-CCNC: 99 U/L (ref 40–129)
ALT SERPL-CCNC: 13 U/L (ref 10–40)
AMPHETAMINES UR QL SCN>1000 NG/ML: NORMAL
ANION GAP SERPL CALCULATED.3IONS-SCNC: 10 MMOL/L (ref 3–16)
APAP SERPL-MCNC: <5 UG/ML (ref 10–30)
AST SERPL-CCNC: 23 U/L (ref 15–37)
BARBITURATES UR QL SCN>200 NG/ML: NORMAL
BASOPHILS # BLD: 0 K/UL (ref 0–0.2)
BASOPHILS NFR BLD: 0.3 %
BENZODIAZ UR QL SCN>200 NG/ML: NORMAL
BILIRUB SERPL-MCNC: 0.6 MG/DL (ref 0–1)
BILIRUB UR QL STRIP.AUTO: NEGATIVE
BUN SERPL-MCNC: 15 MG/DL (ref 7–20)
CALCIUM SERPL-MCNC: 8.1 MG/DL (ref 8.3–10.6)
CANNABINOIDS UR QL SCN>50 NG/ML: NORMAL
CHLORIDE SERPL-SCNC: 104 MMOL/L (ref 99–110)
CLARITY UR: CLEAR
CO2 SERPL-SCNC: 25 MMOL/L (ref 21–32)
COCAINE UR QL SCN: NORMAL
COLOR UR: YELLOW
CREAT SERPL-MCNC: 1.2 MG/DL (ref 0.8–1.3)
DEPRECATED RDW RBC AUTO: 16.6 % (ref 12.4–15.4)
DRUG SCREEN COMMENT UR-IMP: NORMAL
EOSINOPHIL # BLD: 0.3 K/UL (ref 0–0.6)
EOSINOPHIL NFR BLD: 3.9 %
ETHANOLAMINE SERPL-MCNC: NORMAL MG/DL (ref 0–0.08)
FENTANYL SCREEN, URINE: NORMAL
FLUAV RNA RESP QL NAA+PROBE: NOT DETECTED
FLUBV RNA RESP QL NAA+PROBE: NOT DETECTED
GFR SERPLBLD CREATININE-BSD FMLA CKD-EPI: 59 ML/MIN/{1.73_M2}
GLUCOSE BLD-MCNC: 107 MG/DL (ref 70–99)
GLUCOSE SERPL-MCNC: 78 MG/DL (ref 70–99)
GLUCOSE UR STRIP.AUTO-MCNC: NEGATIVE MG/DL
HCT VFR BLD AUTO: 43.5 % (ref 40.5–52.5)
HGB BLD-MCNC: 14.5 G/DL (ref 13.5–17.5)
HGB UR QL STRIP.AUTO: NEGATIVE
KETONES UR STRIP.AUTO-MCNC: NEGATIVE MG/DL
LEUKOCYTE ESTERASE UR QL STRIP.AUTO: NEGATIVE
LYMPHOCYTES # BLD: 1.8 K/UL (ref 1–5.1)
LYMPHOCYTES NFR BLD: 26.8 %
MCH RBC QN AUTO: 29.7 PG (ref 26–34)
MCHC RBC AUTO-ENTMCNC: 33.3 G/DL (ref 31–36)
MCV RBC AUTO: 89.3 FL (ref 80–100)
METHADONE UR QL SCN>300 NG/ML: NORMAL
MONOCYTES # BLD: 0.6 K/UL (ref 0–1.3)
MONOCYTES NFR BLD: 8.5 %
NEUTROPHILS # BLD: 4 K/UL (ref 1.7–7.7)
NEUTROPHILS NFR BLD: 60.5 %
NITRITE UR QL STRIP.AUTO: NEGATIVE
NT-PROBNP SERPL-MCNC: 150 PG/ML (ref 0–449)
OPIATES UR QL SCN>300 NG/ML: NORMAL
OXYCODONE UR QL SCN: NORMAL
PCP UR QL SCN>25 NG/ML: NORMAL
PERFORMED ON: ABNORMAL
PH UR STRIP.AUTO: 6 [PH] (ref 5–8)
PH UR STRIP: 6 [PH]
PLATELET # BLD AUTO: 155 K/UL (ref 135–450)
PMV BLD AUTO: 10.4 FL (ref 5–10.5)
POTASSIUM SERPL-SCNC: 3.7 MMOL/L (ref 3.5–5.1)
PROT SERPL-MCNC: 6.2 G/DL (ref 6.4–8.2)
PROT UR STRIP.AUTO-MCNC: NEGATIVE MG/DL
RBC # BLD AUTO: 4.87 M/UL (ref 4.2–5.9)
SALICYLATES SERPL-MCNC: <0.5 MG/DL (ref 15–30)
SARS-COV-2 RNA RESP QL NAA+PROBE: NOT DETECTED
SODIUM SERPL-SCNC: 139 MMOL/L (ref 136–145)
SP GR UR STRIP.AUTO: 1.01 (ref 1–1.03)
UA COMPLETE W REFLEX CULTURE PNL UR: NORMAL
UA DIPSTICK W REFLEX MICRO PNL UR: NORMAL
URN SPEC COLLECT METH UR: NORMAL
UROBILINOGEN UR STRIP-ACNC: 0.2 E.U./DL
WBC # BLD AUTO: 6.6 K/UL (ref 4–11)

## 2025-01-29 PROCEDURE — 80179 DRUG ASSAY SALICYLATE: CPT

## 2025-01-29 PROCEDURE — 80143 DRUG ASSAY ACETAMINOPHEN: CPT

## 2025-01-29 PROCEDURE — 1240000000 HC EMOTIONAL WELLNESS R&B

## 2025-01-29 PROCEDURE — 99285 EMERGENCY DEPT VISIT HI MDM: CPT

## 2025-01-29 PROCEDURE — 83880 ASSAY OF NATRIURETIC PEPTIDE: CPT

## 2025-01-29 PROCEDURE — 80307 DRUG TEST PRSMV CHEM ANLYZR: CPT

## 2025-01-29 PROCEDURE — 80053 COMPREHEN METABOLIC PANEL: CPT

## 2025-01-29 PROCEDURE — 87636 SARSCOV2 & INF A&B AMP PRB: CPT

## 2025-01-29 PROCEDURE — 93005 ELECTROCARDIOGRAM TRACING: CPT | Performed by: NURSE PRACTITIONER

## 2025-01-29 PROCEDURE — 85025 COMPLETE CBC W/AUTO DIFF WBC: CPT

## 2025-01-29 PROCEDURE — 81003 URINALYSIS AUTO W/O SCOPE: CPT

## 2025-01-29 PROCEDURE — 82077 ASSAY SPEC XCP UR&BREATH IA: CPT

## 2025-01-29 PROCEDURE — 71045 X-RAY EXAM CHEST 1 VIEW: CPT

## 2025-01-29 PROCEDURE — 90791 PSYCH DIAGNOSTIC EVALUATION: CPT | Performed by: SOCIAL WORKER

## 2025-01-29 RX ORDER — POLYETHYLENE GLYCOL 3350 17 G
2 POWDER IN PACKET (EA) ORAL
Status: DISCONTINUED | OUTPATIENT
Start: 2025-01-29 | End: 2025-02-04 | Stop reason: HOSPADM

## 2025-01-29 RX ORDER — TRAZODONE HYDROCHLORIDE 50 MG/1
50 TABLET, FILM COATED ORAL NIGHTLY PRN
Status: DISCONTINUED | OUTPATIENT
Start: 2025-01-29 | End: 2025-02-04 | Stop reason: HOSPADM

## 2025-01-29 RX ORDER — ACETAMINOPHEN 325 MG/1
650 TABLET ORAL EVERY 8 HOURS PRN
Status: DISCONTINUED | OUTPATIENT
Start: 2025-01-29 | End: 2025-02-04 | Stop reason: HOSPADM

## 2025-01-29 RX ORDER — HYDROXYZINE HYDROCHLORIDE 50 MG/1
50 TABLET, FILM COATED ORAL 3 TIMES DAILY PRN
Status: DISCONTINUED | OUTPATIENT
Start: 2025-01-29 | End: 2025-02-04 | Stop reason: HOSPADM

## 2025-01-29 RX ORDER — OLANZAPINE 5 MG/1
5 TABLET ORAL 3 TIMES DAILY PRN
Status: DISCONTINUED | OUTPATIENT
Start: 2025-01-29 | End: 2025-02-04 | Stop reason: HOSPADM

## 2025-01-29 ASSESSMENT — SLEEP AND FATIGUE QUESTIONNAIRES
DO YOU HAVE DIFFICULTY SLEEPING: NO
DO YOU USE A SLEEP AID: NO
AVERAGE NUMBER OF SLEEP HOURS: 6

## 2025-01-29 ASSESSMENT — PATIENT HEALTH QUESTIONNAIRE - PHQ9
SUM OF ALL RESPONSES TO PHQ QUESTIONS 1-9: 2
1. LITTLE INTEREST OR PLEASURE IN DOING THINGS: SEVERAL DAYS
SUM OF ALL RESPONSES TO PHQ QUESTIONS 1-9: 2
2. FEELING DOWN, DEPRESSED OR HOPELESS: SEVERAL DAYS
SUM OF ALL RESPONSES TO PHQ9 QUESTIONS 1 & 2: 2

## 2025-01-29 ASSESSMENT — LIFESTYLE VARIABLES
HOW OFTEN DO YOU HAVE A DRINK CONTAINING ALCOHOL: NEVER
HOW OFTEN DO YOU HAVE A DRINK CONTAINING ALCOHOL: NEVER
HOW MANY STANDARD DRINKS CONTAINING ALCOHOL DO YOU HAVE ON A TYPICAL DAY: PATIENT DOES NOT DRINK
HOW MANY STANDARD DRINKS CONTAINING ALCOHOL DO YOU HAVE ON A TYPICAL DAY: PATIENT DOES NOT DRINK

## 2025-01-29 NOTE — ED PROVIDER NOTES
[facility] EMERGENCY DEPARTMENT  EMERGENCY DEPARTMENT ENCOUNTER      I am the Primary Clinician of Record    Note started: 4:44 PM EST 1/29/25    WILLIAM. I have evaluated this patient.          Pt Name: Yifan Cormier  MRN: [id number]  Birthdate 1940  Dateof evaluation: 1/29/2025  Provider: Danica Cardozo, APRN - CNP  PCP: Alexis Chan DO  ED Attending: No att. providers found      CHIEF COMPLAINT       Chief Complaint   Patient presents with    Psychiatric Evaluation     SOB by police.  Threatened to get gun and blow up SNF.        HISTORY OF PRESENTILLNESS   (Location/Symptom, Timing/Onset, Context/Setting, Quality, Duration, Modifying Factors, Severity)  Note limiting factors.     Yifan Cormier is a 84 y.o. male for aggressive and violent behavior. Onset was today.  Context includes patient was brought in by the police after he made threats that he was going to take a gun and shoot up [place].  He reports that his wife wants out that nursing home and he has been asked to leave the nursing home when he visits due to wild violent behavior. Alleviating factors include nothing.  Aggravating factors include nothing. Pain is 0/10.  Nothing has been used for pain today.     Nursing Notes were all reviewed and agreed with or any disagreements were addressed  in the HPI.      REVIEW OF SYSTEMS       Review of Systems   Constitutional:  Negative for activity change, appetite change and fever.   HENT:  Negative for congestion, facial swelling, rhinorrhea and sore throat.    Eyes:  Negative for visual disturbance.   Respiratory:  Negative for shortness of breath.    Cardiovascular:  Negative for chest pain.   Gastrointestinal:  Negative for abdominal pain.   Genitourinary:  Negative for difficulty urinating.   Musculoskeletal:  Negative for arthralgias and myalgias.   Skin:  Negative for color change and rash.   Neurological:  Negative for dizziness and light-headedness.

## 2025-01-29 NOTE — ED NOTES
Patient presented to ED via Police WITH statement of belief signed by officer for evaluation. Explained process of securing patient belongings for patient/staff safety, patient verbalized understanding. Security at bedside, metal detector wanding completed. Patient changed into safety gown. All belongings itemized by  [place], placed in labeled bag, removed from the patient care area, and taken to the security locker. Itemized list placed with belongings, in patient record, and a copy given to the patient.
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
## 2025-01-29 NOTE — ED NOTES
Marry [phone] from adult protective services would like for telepsych to contact her regarding the patient.

## 2025-01-30 ENCOUNTER — APPOINTMENT (OUTPATIENT)
Dept: CT IMAGING | Age: 85
DRG: 884 | End: 2025-01-30
Payer: MEDICARE

## 2025-01-30 PROBLEM — F39 UNSPECIFIED MOOD (AFFECTIVE) DISORDER (HCC): Status: ACTIVE | Noted: 2025-01-30

## 2025-01-30 PROBLEM — R46.89 THREATENING BEHAVIOR: Status: ACTIVE | Noted: 2025-01-30

## 2025-01-30 LAB
EKG DIAGNOSIS: NORMAL
EKG Q-T INTERVAL: 462 MS
EKG QRS DURATION: 86 MS
EKG QTC CALCULATION (BAZETT): 438 MS
EKG R AXIS: 7 DEGREES
EKG T AXIS: 12 DEGREES
EKG VENTRICULAR RATE: 54 BPM

## 2025-01-30 PROCEDURE — 99221 1ST HOSP IP/OBS SF/LOW 40: CPT

## 2025-01-30 PROCEDURE — 70450 CT HEAD/BRAIN W/O DYE: CPT

## 2025-01-30 PROCEDURE — 93010 ELECTROCARDIOGRAM REPORT: CPT | Performed by: INTERNAL MEDICINE

## 2025-01-30 PROCEDURE — 6370000000 HC RX 637 (ALT 250 FOR IP): Performed by: PSYCHIATRY & NEUROLOGY

## 2025-01-30 PROCEDURE — 6370000000 HC RX 637 (ALT 250 FOR IP)

## 2025-01-30 PROCEDURE — 90792 PSYCH DIAG EVAL W/MED SRVCS: CPT | Performed by: PSYCHIATRY & NEUROLOGY

## 2025-01-30 PROCEDURE — 1240000000 HC EMOTIONAL WELLNESS R&B

## 2025-01-30 RX ORDER — MAGNESIUM HYDROXIDE/ALUMINUM HYDROXICE/SIMETHICONE 120; 1200; 1200 MG/30ML; MG/30ML; MG/30ML
30 SUSPENSION ORAL EVERY 6 HOURS PRN
Status: DISCONTINUED | OUTPATIENT
Start: 2025-01-30 | End: 2025-02-04 | Stop reason: HOSPADM

## 2025-01-30 RX ORDER — ESCITALOPRAM OXALATE 10 MG/1
20 TABLET ORAL DAILY
Status: DISCONTINUED | OUTPATIENT
Start: 2025-01-30 | End: 2025-02-04 | Stop reason: HOSPADM

## 2025-01-30 RX ORDER — PANTOPRAZOLE SODIUM 40 MG/1
40 TABLET, DELAYED RELEASE ORAL NIGHTLY
Status: DISCONTINUED | OUTPATIENT
Start: 2025-01-30 | End: 2025-02-04 | Stop reason: HOSPADM

## 2025-01-30 RX ORDER — ATORVASTATIN CALCIUM 40 MG/1
80 TABLET, FILM COATED ORAL NIGHTLY
Status: DISCONTINUED | OUTPATIENT
Start: 2025-01-30 | End: 2025-02-04 | Stop reason: HOSPADM

## 2025-01-30 RX ORDER — CARVEDILOL 6.25 MG/1
6.25 TABLET ORAL 2 TIMES DAILY WITH MEALS
Status: DISCONTINUED | OUTPATIENT
Start: 2025-01-30 | End: 2025-02-04 | Stop reason: HOSPADM

## 2025-01-30 RX ORDER — LOSARTAN POTASSIUM 100 MG/1
100 TABLET ORAL DAILY
Status: DISCONTINUED | OUTPATIENT
Start: 2025-01-30 | End: 2025-02-04 | Stop reason: HOSPADM

## 2025-01-30 RX ORDER — LOSARTAN POTASSIUM AND HYDROCHLOROTHIAZIDE 25; 100 MG/1; MG/1
1 TABLET ORAL DAILY
Status: DISCONTINUED | OUTPATIENT
Start: 2025-01-30 | End: 2025-01-30 | Stop reason: CLARIF

## 2025-01-30 RX ORDER — HYDROCHLOROTHIAZIDE 25 MG/1
25 TABLET ORAL DAILY
Status: DISCONTINUED | OUTPATIENT
Start: 2025-01-30 | End: 2025-02-04 | Stop reason: HOSPADM

## 2025-01-30 RX ADMIN — TRAZODONE HYDROCHLORIDE 50 MG: 50 TABLET ORAL at 20:25

## 2025-01-30 RX ADMIN — HYDROCHLOROTHIAZIDE 25 MG: 25 TABLET ORAL at 11:02

## 2025-01-30 RX ADMIN — LEVOTHYROXINE SODIUM 75 MCG: 0.05 TABLET ORAL at 11:03

## 2025-01-30 RX ADMIN — LOSARTAN POTASSIUM 100 MG: 100 TABLET, FILM COATED ORAL at 11:02

## 2025-01-30 RX ADMIN — CARVEDILOL 6.25 MG: 6.25 TABLET, FILM COATED ORAL at 11:02

## 2025-01-30 RX ADMIN — ESCITALOPRAM OXALATE 20 MG: 10 TABLET ORAL at 14:26

## 2025-01-30 RX ADMIN — PANTOPRAZOLE SODIUM 40 MG: 40 TABLET, DELAYED RELEASE ORAL at 20:25

## 2025-01-30 RX ADMIN — ATORVASTATIN CALCIUM 80 MG: 40 TABLET, FILM COATED ORAL at 20:25

## 2025-01-30 ASSESSMENT — PATIENT HEALTH QUESTIONNAIRE - PHQ9
SUM OF ALL RESPONSES TO PHQ9 QUESTIONS 1 & 2: 2
2. FEELING DOWN, DEPRESSED OR HOPELESS: SEVERAL DAYS
SUM OF ALL RESPONSES TO PHQ QUESTIONS 1-9: 2
1. LITTLE INTEREST OR PLEASURE IN DOING THINGS: SEVERAL DAYS
SUM OF ALL RESPONSES TO PHQ QUESTIONS 1-9: 2

## 2025-01-30 ASSESSMENT — SLEEP AND FATIGUE QUESTIONNAIRES
DO YOU USE A SLEEP AID: NO
AVERAGE NUMBER OF SLEEP HOURS: 6
DO YOU HAVE DIFFICULTY SLEEPING: NO

## 2025-01-30 ASSESSMENT — PAIN SCALES - GENERAL: PAINLEVEL_OUTOF10: 0

## 2025-01-30 NOTE — H&P
Hospital Medicine History & Physical      PCP: Alexis Chan DO    Date of Admission: 1/29/2025    Date of Service: Pt seen/examined on 01/30/25    Chief Complaint:    Chief Complaint   Patient presents with    Psychiatric Evaluation     SOB by police.  Threatened to get gun and blow up SNF.          History Of Present Illness:      The patient is a 84 y.o. male with PMHx dementia, HTN, HLD, CKD, DM2, CAD, hypothyroidism, GERD who presented to ED for threatening behavior.  Patient was seen and evaluated in the ED by the ED medical provider, patient was medically cleared for admission to [facility].  This note serves as an admission medical H&P.    Tobacco use: denies  ETOH use: denies  Illicit drug use: denies    Patient very hard of hearing. Requiring pen and paper for exam. Batteries for hearing aids going to be replaced. Patient denies any medical complaints.    Past Medical History:        Diagnosis Date    CAD (coronary artery disease)     Diabetes (HCC)     Stroke (HCC)        Past Surgical History:        Procedure Laterality Date    CARDIAC SURGERY  09/10/2013    3 stents       Medications Prior to Admission:    Prior to Admission medications    Medication Sig Start Date End Date Taking? Authorizing Provider   cetirizine (ZYRTEC) 5 MG tablet Take 1 tablet by mouth daily 12/20/24   Alexis Chan DO   atorvastatin (LIPITOR) 80 MG tablet Take 1 tablet by mouth daily 12/3/24   Alexis Chan, DO   carvedilol (COREG) 6.25 MG tablet Take 1 tablet by mouth 2 times daily (with meals) with meals 11/27/24   Alexis Chan, DO   losartan-hydroCHLOROthiazide (HYZAAR) 100-25 MG per tablet Take 1 tablet by mouth daily 11/7/24   Alexis Chan,    Magic Mouthwash (MIRACLE MOUTHWASH) Swish and spit 5 mLs 4 times daily as needed for Irritation 11/7/24   Alexis Chan, DO   busPIRone (BUSPAR) 5 MG tablet TAKE 2 TABLETS BY MOUTH EVERY MORNING, TAKE 1 TABLET IN AFTERNOON, AND TAKE 1 TABLET IN THE

## 2025-01-30 NOTE — GROUP NOTE
Group Therapy Note    Date: 1/30/2025    Group Start Time: 1330  Group End Time: 1415  Group Topic: Activity    [facility]    Piedad Walter LPC    Group members engaged in activity of \"bingo\". Group members worked to keep track of numbers that had been called. The purpose of the group was to improve attention and promote socialization to enhance mood.     Group Therapy Note    Attendees: 5       Notes:  Patient engaged throughout the group until he met with the doctor/SW. Patient participated and interacted with peers appropriately.     Status After Intervention:  Improved    Participation Level: Active Listener and Interactive    Participation Quality: Appropriate, Attentive, Sharing, and Supportive      Speech:  normal      Thought Process/Content: Linear      Affective Functioning: Congruent      Mood: euthymic      Level of consciousness:  Alert      Response to Learning: Able to verbalize current knowledge/experience      Endings: None Reported    Modes of Intervention: Support, Socialization, Problem-solving, and Activity      Discipline Responsible: /Counselor      Signature:  Piedad Walter LPC

## 2025-01-30 NOTE — PLAN OF CARE
Problem: Safety - Adult  Goal: Free from fall injury  Outcome: Progressing     Problem: ABCDS Injury Assessment  Goal: Absence of physical injury  Outcome: Progressing     Problem: Pain  Goal: Verbalizes/displays adequate comfort level or baseline comfort level  Outcome: Progressing     Problem: Gena  Goal: Will exhibit normal sleep and speech and no impulsivity  Description: INTERVENTIONS:  1. Administer medication as ordered  2. Set limits on impulsive behavior  3. Make attempts to decrease external stimuli as possible  Outcome: Progressing     Problem: Behavior  Goal: Pt/Family maintain appropriate behavior and adhere to behavioral management agreement, if implemented  Description: INTERVENTIONS:  1. Assess patient/family's coping skills and  non-compliant behavior (including use of illegal substances)  2. Notify security of behavior or suspected illegal substances which indicate the need for search of the family and/or belongings  3. Encourage verbalization of thoughts and concerns in a socially appropriate manner  4. Utilize positive, consistent limit setting strategies supporting safety of patient, staff and others  5. Encourage participation in the decision making process about the behavioral management agreement  6. If a visitor's behavior poses a threat to safety call refer to organization policy.  7. Initiate consult with , Psychosocial CNS, Spiritual Care as appropriate  Outcome: Progressing     Problem: Anxiety  Goal: Will report anxiety at manageable levels  Description: INTERVENTIONS:  1. Administer medication as ordered  2. Teach and rehearse alternative coping skills  3. Provide emotional support with 1:1 interaction with staff  Outcome: Progressing     Problem: Involuntary Admit  Goal: Will cooperate with staff recommendations and doctor's orders and will demonstrate appropriate behavior  Description: INTERVENTIONS:  1. Treat underlying conditions and offer medication as ordered  2.

## 2025-01-30 NOTE — ED PROVIDER NOTES
I did not perform a face-to-face evaluation of this patient.  I was available for consultation as needed.  I did interpret the patient's EKG as noted below:     The Ekg interpreted by me shows  sinus bradycardia, rate=54    Axis is   Normal  QTc is  normal  Intervals and Durations are unremarkable.      ST Segments: nonspecific changes  No significant change from prior EKG dated 12/20/24          Shelly Luther MD  01/29/25 [id number]

## 2025-01-30 NOTE — CARE COORDINATION
(MOCA) Fabio Cognitive Assessment :  (See pt folder behind nurses station for copy of assessment while pt is admitted.)   Total Score: Sum of all subscores listed on the right-hand side. Add one point for an individual who has 12 years or fewer of formal education, for a possible maximum of 30 points. A final total score of 26 and above is considered normal.       Education Level 12+      Visuospatial/Executive  3/5   Naming  2/3   Attention  5/6   Language 3/3   Abstraction  2/2   Delayed Recall     MIS = 10/15    2/5   Orientation  6/6   (additional point for <12 grade educations)  0/1   Total Score     23/30
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
## 2025-01-30 NOTE — TRANSFER CENTER NOTE
Transfer Center Handoff for Behavioral Health Transfers      Patient's Current Location: [facility] EMERGENCY DEPARTMENT     Chief Complaint   Patient presents with    Psychiatric Evaluation     SOB by police.  Threatened to get gun and blow up SNF.        Current or History of Violent Behavior: Yes    Currently in Restraints Now or During this Encounter: No  (Specify if Agitation or self harm is noted in ED?)  If yes, please describe behaviors requiring restraint:             Medical Clearance Documented and Verified in the Chart: Yes    Allergies   Allergen Reactions    Penicillins Hives and Other (See Comments)        Can Patient Tolerate Lying Flat: Yes    Able to Perform ADLs:  Yes  (Specify if able to ambulate or uses any mobility devices such as cane or walker)  Activity:  independent  Level of Assistance:    Assistive Device:  cane  Miscellaneous Devices:      LABS    CBC:   Lab Results   Component Value Date/Time    WBC 6.6 01/29/2025 04:35 PM    RBC 4.87 01/29/2025 04:35 PM    HGB 14.5 01/29/2025 04:35 PM    HCT 43.5 01/29/2025 04:35 PM    MCV 89.3 01/29/2025 04:35 PM    MCH 29.7 01/29/2025 04:35 PM    MCHC 33.3 01/29/2025 04:35 PM    RDW 16.6 01/29/2025 04:35 PM     01/29/2025 04:35 PM    MPV 10.4 01/29/2025 04:35 PM     CMP:   Lab Results   Component Value Date/Time     01/29/2025 04:35 PM    K 3.7 01/29/2025 04:35 PM     01/29/2025 04:35 PM    CO2 25 01/29/2025 04:35 PM    BUN 15 01/29/2025 04:35 PM    CREATININE 1.2 01/29/2025 04:35 PM    GFRAA >60 10/06/2021 12:31 PM    AGRATIO 1.4 01/29/2025 04:35 PM    LABGLOM 59 01/29/2025 04:35 PM    LABGLOM >60 09/19/2023 08:57 AM    GLUCOSE 78 01/29/2025 04:35 PM    CALCIUM 8.1 01/29/2025 04:35 PM    BILITOT 0.6 01/29/2025 04:35 PM    ALKPHOS 99 01/29/2025 04:35 PM    AST 23 01/29/2025 04:35 PM    ALT 13 01/29/2025 04:35 PM     Drug Panel: No results found for: \"AMPHETAMUR\", \"BARBITURATUR\", \"COCAINEUR\", \"METHADU\", \"OPIAU\", \"THCUR\",

## 2025-01-30 NOTE — PLAN OF CARE
Problem: Behavior  Goal: Pt/Family maintain appropriate behavior and adhere to behavioral management agreement, if implemented  Description: INTERVENTIONS:  1. Assess patient/family's coping skills and  non-compliant behavior (including use of illegal substances)  2. Notify security of behavior or suspected illegal substances which indicate the need for search of the family and/or belongings  3. Encourage verbalization of thoughts and concerns in a socially appropriate manner  4. Utilize positive, consistent limit setting strategies supporting safety of patient, staff and others  5. Encourage participation in the decision making process about the behavioral management agreement  6. If a visitor's behavior poses a threat to safety call refer to organization policy.  7. Initiate consult with , Psychosocial CNS, Spiritual Care as appropriate  Outcome: Not Progressing   Yifan was visible for much of the day.  He ate all meals in the day room and was compliant with all medications.  Yifan is hard of hearing.  Batteries were secured for Yifan's hearing aid and then the hearing aid quit working, even with new batteries.  Hearing aid is located in Yifan's locker.   Communication with Yifan is best done by writing on paper.

## 2025-01-30 NOTE — CARE COORDINATION
Pt was friendly and cooperative throughout the assessment process, but ability to participate was limited by his severe hearing loss.             25 1518   Psychiatric History   Psychiatric history treatment   (denies previous psych hx/treatment)   Contact information N/A   Are there any medication issues? No   Recent Psychological Experiences Conflict (comment);Financial   Support System   Support system Access to others   Types of Support System Other (Comment)  (APS worker, adult sons, extended family in [place])   Problems in support system Alienated/estranged;Other (Comment)  (lacks family in the area)   Current Living Situation   Home Living Adequate   Living information Lives alone   Problems with living situation  No   Lack of basic needs No   SSDI/SSI social security alf   Other government assistance denies   Problems with environment denies   Current abuse issues denies   Supervised setting None   Relationship problems No   Contact information N/A   Medical and Self-Care Issues   Relevant medical problems severe hearing loss   Relevant self-care issues denies   Barriers to treatment No   Family Constellation   Spouse/partner-name/age wife Fartun \"Maude\", lives in a nursing home due to dementia   Children-names/ages 2 adult sonsIftikhar lives in [place] and is legal guardian of pt's wife. Son Brian lives local and helps pt as needed, but they are not close due to pt's hx of verbal aggression   Parents , reports having a good relationship with them   Siblings none but reports having first cousins he was close with and who he considered brothers   Contact information N/A   Support services   (N/A)   Comment N/A   Childhood   Raised by Biological mother;Biological father   Biological mother , raised in [place] close to extended family, reports having a good childhood and was very close with his cousins   Biological father ,   Relevant family history none   History of

## 2025-01-31 PROCEDURE — 6370000000 HC RX 637 (ALT 250 FOR IP)

## 2025-01-31 PROCEDURE — 99233 SBSQ HOSP IP/OBS HIGH 50: CPT | Performed by: PSYCHIATRY & NEUROLOGY

## 2025-01-31 PROCEDURE — 1240000000 HC EMOTIONAL WELLNESS R&B

## 2025-01-31 PROCEDURE — 97166 OT EVAL MOD COMPLEX 45 MIN: CPT

## 2025-01-31 PROCEDURE — 97530 THERAPEUTIC ACTIVITIES: CPT

## 2025-01-31 PROCEDURE — 6370000000 HC RX 637 (ALT 250 FOR IP): Performed by: PSYCHIATRY & NEUROLOGY

## 2025-01-31 RX ORDER — DIVALPROEX SODIUM 250 MG/1
250 TABLET, FILM COATED, EXTENDED RELEASE ORAL 2 TIMES DAILY
Status: DISCONTINUED | OUTPATIENT
Start: 2025-01-31 | End: 2025-02-01

## 2025-01-31 RX ORDER — DIVALPROEX SODIUM 250 MG/1
250 TABLET, FILM COATED, EXTENDED RELEASE ORAL DAILY
Status: DISCONTINUED | OUTPATIENT
Start: 2025-01-31 | End: 2025-01-31

## 2025-01-31 RX ADMIN — LEVOTHYROXINE SODIUM 75 MCG: 0.05 TABLET ORAL at 06:09

## 2025-01-31 RX ADMIN — ESCITALOPRAM OXALATE 20 MG: 10 TABLET ORAL at 10:13

## 2025-01-31 RX ADMIN — ATORVASTATIN CALCIUM 80 MG: 40 TABLET, FILM COATED ORAL at 20:29

## 2025-01-31 RX ADMIN — PANTOPRAZOLE SODIUM 40 MG: 40 TABLET, DELAYED RELEASE ORAL at 20:29

## 2025-01-31 RX ADMIN — DIVALPROEX SODIUM 250 MG: 250 TABLET, FILM COATED, EXTENDED RELEASE ORAL at 20:30

## 2025-01-31 RX ADMIN — LOSARTAN POTASSIUM 100 MG: 100 TABLET, FILM COATED ORAL at 10:13

## 2025-01-31 RX ADMIN — TRAZODONE HYDROCHLORIDE 50 MG: 50 TABLET ORAL at 20:29

## 2025-01-31 RX ADMIN — HYDROCHLOROTHIAZIDE 25 MG: 25 TABLET ORAL at 10:13

## 2025-01-31 ASSESSMENT — PAIN SCALES - GENERAL
PAINLEVEL_OUTOF10: 0
PAINLEVEL_OUTOF10: 0

## 2025-01-31 NOTE — PLAN OF CARE
Problem: Safety - Adult  Goal: Free from fall injury  1/31/2025 1048 by Alice Ureña RN  Outcome: Progressing     Problem: Behavior  Goal: Pt/Family maintain appropriate behavior and adhere to behavioral management agreement, if implemented  Description: INTERVENTIONS:  1. Assess patient/family's coping skills and  non-compliant behavior (including use of illegal substances)  2. Notify security of behavior or suspected illegal substances which indicate the need for search of the family and/or belongings  3. Encourage verbalization of thoughts and concerns in a socially appropriate manner  4. Utilize positive, consistent limit setting strategies supporting safety of patient, staff and others  5. Encourage participation in the decision making process about the behavioral management agreement  6. If a visitor's behavior poses a threat to safety call refer to organization policy.  7. Initiate consult with , Psychosocial CNS, Spiritual Care as appropriate  Outcome: Not Progressing  Flowsheets (Taken 1/30/2025 2118 by Stacey Hewitt LPN)  Patient/family maintains appropriate behavior and adheres to behavioral management agreement, if implemented: Utilize positive, consistent limit setting strategies supporting safety of patient, staff and others     Problem: Behavior  Goal: Pt/Family maintain appropriate behavior and adhere to behavioral management agreement, if implemented  Description: INTERVENTIONS:  1. Assess patient/family's coping skills and  non-compliant behavior (including use of illegal substances)  2. Notify security of behavior or suspected illegal substances which indicate the need for search of the family and/or belongings  3. Encourage verbalization of thoughts and concerns in a socially appropriate manner  4. Utilize positive, consistent limit setting strategies supporting safety of patient, staff and others  5. Encourage participation in the decision making process about the

## 2025-01-31 NOTE — BH NOTE
Yifan ate well for meals today and has been sitting in the [facility] area watching TV.  Very hard of hearing and needs to have questions written down and will answer.  Sexually inappropriate at times with peers or staff and is easily redirected.  Suggestive sexually inappropriate.  Ate well for meals and is A&Ox4.   Reports some depression and anxiety related to his situation with his wife and her \"lack of participation\" in their marriage all his life.  He reports he slept well and had a BM this AM.  Denies any pain.   Asked to leave and explained he is starting on a new medication and we would like him to stay to adjust meds if necessary.  Somewhat agreeable to stay but informs me he will be asking to leave tomorrow because meds are not going to \"fix\" his situation.

## 2025-01-31 NOTE — BH NOTE
[facility]  Treatment Team Note  Review Date & Time: 1/31/2025  9:28    Patient was not in treatment team      Status EXAM:   Mental Status and Behavioral Exam  Normal: No  Level of Assistance: Independent/Self  Facial Expression: Exaggerated  Affect: Congruent  Level of Consciousness: Alert  Frequency of Checks: 4 times per hour, close  Mood:Normal: No  Mood: Anxious  Motor Activity:Normal: Yes  Motor Activity: Other (comment)  Eye Contact: Good  Observed Behavior: Cooperative  Sexual Misconduct History: Current - no  Preception: [place] to person, [place] to time, [place] to place, [place] to situation  Attention:Normal: Yes  Attention:  (N/A)  Thought Processes: Circumstantial  Thought Content:Normal: Yes  Thought Content:  (N/A)  Depression Symptoms: No problems reported or observed.  Anxiety Symptoms: No problems reported or observed.  Gena Symptoms: No problems reported or observed.  Hallucinations: None  Delusions: No  Delusions:  (N/A)  Memory:Normal: Yes  Memory: Other (comment)  Insight and Judgment: No  Insight and Judgment: Poor insight, Poor judgment      Suicide Risk CSSR-S:  1) Within the past month, have you wished you were dead or wished you could go to sleep and not wake up? : No  2) Have you actually had any thoughts of killing yourself? : No  6) Have you ever done anything, started to do anything, or prepared to do anything to end your life?: No      PLAN/TREATMENT RECOMMENDATIONS UPDATE: Patient will take medication as prescribed, eat 75% of meals, attend groups, participate in milieu activities, participate in treatment team and care planning for discharge and follow up.           Pam Conte RN

## 2025-01-31 NOTE — PLAN OF CARE
Problem: Safety - Adult  Goal: Free from fall injury  Outcome: Progressing     Problem: ABCDS Injury Assessment  Goal: Absence of physical injury  Outcome: Progressing     Problem: Pain  Goal: Verbalizes/displays adequate comfort level or baseline comfort level  Outcome: Progressing     Problem: Gena  Goal: Will exhibit normal sleep and speech and no impulsivity  Description: INTERVENTIONS:  1. Administer medication as ordered  2. Set limits on impulsive behavior  3. Make attempts to decrease external stimuli as possible  Outcome: Progressing     Problem: Behavior  Goal: Pt/Family maintain appropriate behavior and adhere to behavioral management agreement, if implemented  Description: INTERVENTIONS:  1. Assess patient/family's coping skills and  non-compliant behavior (including use of illegal substances)  2. Notify security of behavior or suspected illegal substances which indicate the need for search of the family and/or belongings  3. Encourage verbalization of thoughts and concerns in a socially appropriate manner  4. Utilize positive, consistent limit setting strategies supporting safety of patient, staff and others  5. Encourage participation in the decision making process about the behavioral management agreement  6. If a visitor's behavior poses a threat to safety call refer to organization policy.  7. Initiate consult with , Psychosocial CNS, Spiritual Care as appropriate  Recent Flowsheet Documentation  Taken 1/30/2025 2118 by Stacey Hewitt LPN  Patient/family maintains appropriate behavior and adheres to behavioral management agreement, if implemented: Utilize positive, consistent limit setting strategies supporting safety of patient, staff and others  1/30/2025 1820 by Aryan Duckworth, RN  Outcome: Not Progressing

## 2025-02-01 PROCEDURE — 6370000000 HC RX 637 (ALT 250 FOR IP)

## 2025-02-01 PROCEDURE — 6370000000 HC RX 637 (ALT 250 FOR IP): Performed by: PSYCHIATRY & NEUROLOGY

## 2025-02-01 PROCEDURE — 1240000000 HC EMOTIONAL WELLNESS R&B

## 2025-02-01 PROCEDURE — 99233 SBSQ HOSP IP/OBS HIGH 50: CPT | Performed by: PSYCHIATRY & NEUROLOGY

## 2025-02-01 RX ORDER — DIVALPROEX SODIUM 125 MG/1
250 CAPSULE, COATED PELLETS ORAL 2 TIMES DAILY
Status: DISCONTINUED | OUTPATIENT
Start: 2025-02-01 | End: 2025-02-04 | Stop reason: HOSPADM

## 2025-02-01 RX ADMIN — ESCITALOPRAM OXALATE 20 MG: 10 TABLET ORAL at 08:49

## 2025-02-01 RX ADMIN — ATORVASTATIN CALCIUM 80 MG: 40 TABLET, FILM COATED ORAL at 20:42

## 2025-02-01 RX ADMIN — TRAZODONE HYDROCHLORIDE 50 MG: 50 TABLET ORAL at 20:42

## 2025-02-01 RX ADMIN — PANTOPRAZOLE SODIUM 40 MG: 40 TABLET, DELAYED RELEASE ORAL at 20:42

## 2025-02-01 RX ADMIN — HYDROCHLOROTHIAZIDE 25 MG: 25 TABLET ORAL at 08:50

## 2025-02-01 RX ADMIN — CARVEDILOL 6.25 MG: 6.25 TABLET, FILM COATED ORAL at 08:50

## 2025-02-01 RX ADMIN — DIVALPROEX SODIUM 250 MG: 250 TABLET, FILM COATED, EXTENDED RELEASE ORAL at 08:49

## 2025-02-01 RX ADMIN — LOSARTAN POTASSIUM 100 MG: 100 TABLET, FILM COATED ORAL at 08:49

## 2025-02-01 RX ADMIN — LEVOTHYROXINE SODIUM 75 MCG: 0.05 TABLET ORAL at 06:47

## 2025-02-01 RX ADMIN — DIVALPROEX SODIUM 250 MG: 125 CAPSULE, COATED PELLETS ORAL at 20:42

## 2025-02-01 ASSESSMENT — PAIN SCALES - GENERAL
PAINLEVEL_OUTOF10: 0
PAINLEVEL_OUTOF10: 0

## 2025-02-01 ASSESSMENT — SLEEP AND FATIGUE QUESTIONNAIRES: AVERAGE NUMBER OF SLEEP HOURS: 7.25

## 2025-02-01 NOTE — PLAN OF CARE
Care of pt was assumed at 1930. Pt was calm and cooperative with care; allowing shift assessments, and taking his scheduled HS meds with no issues. He denied pain, SI, HI, and AVH, but endorsed anxiety and depression(rated 4/10 and 10/10, respectively). A PRN dose of Trazodone, 50mg PO was given for sleep at 2029. Med was effective in helping pt sleep. Pt slept for the most part of this shift. No behaviors noted.  Problem: Safety - Adult  Goal: Free from fall injury  Outcome: Progressing  Problem: ABCDS Injury Assessment  Goal: Absence of physical injury  Outcome: Progressing    Problem: Pain  Goal: Verbalizes/displays adequate comfort level or baseline comfort level  Outcome: Progressing    Problem: Gena  Goal: Will exhibit normal sleep and speech and no impulsivity  Description: INTERVENTIONS:  1. Administer medication as ordered  2. Set limits on impulsive behavior  3. Make attempts to decrease external stimuli as possible  Outcome: Progressing    Problem: Behavior  Goal: Pt/Family maintain appropriate behavior and adhere to behavioral management agreement, if implemented  Description: INTERVENTIONS:  1. Assess patient/family's coping skills and  non-compliant behavior (including use of illegal substances)  2. Notify security of behavior or suspected illegal substances which indicate the need for search of the family and/or belongings  3. Encourage verbalization of thoughts and concerns in a socially appropriate manner  4. Utilize positive, consistent limit setting strategies supporting safety of patient, staff and others  5. Encourage participation in the decision making process about the behavioral management agreement  6. If a visitor's behavior poses a threat to safety call refer to organization policy.  7. Initiate consult with , Psychosocial CNS, Spiritual Care as appropriate  Outcome: Progressing  Problem: Anxiety  Goal: Will report anxiety at manageable levels  Description:

## 2025-02-01 NOTE — BH NOTE
[facility]  Treatment Team Note  Review Date & Time: 2/1/2025   0900    Patient was not in treatment team      Status EXAM:   Mental Status and Behavioral Exam  Normal: Yes  Level of Assistance: Set up  Facial Expression: Exaggerated  Affect: Congruent  Level of Consciousness: Alert  Frequency of Checks: 4 times per hour, close  Mood:Normal: No  Mood: Depressed  Motor Activity:Normal: Yes  Motor Activity: Other (comment)  Eye Contact: Good  Observed Behavior: Cooperative  Sexual Misconduct History: Current - no  Preception: [place] to person, [place] to time, [place] to place, [place] to situation  Attention:Normal: Yes  Attention: Others (comment)  Thought Processes: Circumstantial  Thought Content:Normal: Yes  Thought Content: Other (comment)  Depression Symptoms: Feelings of helplessness  Anxiety Symptoms: No problems reported or observed.  Gena Symptoms: No problems reported or observed.  Hallucinations: None  Delusions: No  Delusions: Other (comment)  Memory:Normal: Yes  Memory: Other (comment)  Insight and Judgment: No  Insight and Judgment: Poor insight      Suicide Risk CSSR-S:  1) Within the past month, have you wished you were dead or wished you could go to sleep and not wake up? : No  2) Have you actually had any thoughts of killing yourself? : No  6) Have you ever done anything, started to do anything, or prepared to do anything to end your life?: No      PLAN/TREATMENT RECOMMENDATIONS UPDATE:   Patient will take medication as prescribed, eat 75% of meals, attend groups, participate in milieu activities, participate in treatment team and care planning for discharge and follow up.            Aryan Duckworth RN

## 2025-02-01 NOTE — PLAN OF CARE
Problem: Behavior  Goal: Pt/Family maintain appropriate behavior and adhere to behavioral management agreement, if implemented  Description: INTERVENTIONS:  1. Assess patient/family's coping skills and  non-compliant behavior (including use of illegal substances)  2. Notify security of behavior or suspected illegal substances which indicate the need for search of the family and/or belongings  3. Encourage verbalization of thoughts and concerns in a socially appropriate manner  4. Utilize positive, consistent limit setting strategies supporting safety of patient, staff and others  5. Encourage participation in the decision making process about the behavioral management agreement  6. If a visitor's behavior poses a threat to safety call refer to organization policy.  7. Initiate consult with , Psychosocial CNS, Spiritual Care as appropriate  2/1/2025 0947 by Aryan Duckworth, RN  Outcome: Progress    Yifan has been visible in the milieu this morning.   He ate breakfast while watching TV.  Yifan ate 50-75% of his meal. Yifan was compliant with his medication.  He takes his medication whole with thin liquids. When Yifan saw the Depakote tablet, he decided to take it first because \"this is a big pill\"  Yifan stated that the pill \"got stuck\",   Yifan coughed, spit out his drink, and drank water slowly until he was able to swallow the pill.  Yifan was then able to take all of the smaller pills without any problem.    Yifan showered after taking his medication.  Yifan was able to do most of his shower without assistance.  He was particularly careful about cleaning his feet and drying them properly.

## 2025-02-02 PROCEDURE — 6370000000 HC RX 637 (ALT 250 FOR IP): Performed by: PSYCHIATRY & NEUROLOGY

## 2025-02-02 PROCEDURE — 6370000000 HC RX 637 (ALT 250 FOR IP)

## 2025-02-02 PROCEDURE — 1240000000 HC EMOTIONAL WELLNESS R&B

## 2025-02-02 RX ADMIN — HYDROCHLOROTHIAZIDE 25 MG: 25 TABLET ORAL at 08:45

## 2025-02-02 RX ADMIN — ATORVASTATIN CALCIUM 80 MG: 40 TABLET, FILM COATED ORAL at 21:02

## 2025-02-02 RX ADMIN — PANTOPRAZOLE SODIUM 40 MG: 40 TABLET, DELAYED RELEASE ORAL at 21:02

## 2025-02-02 RX ADMIN — ESCITALOPRAM OXALATE 20 MG: 10 TABLET ORAL at 08:44

## 2025-02-02 RX ADMIN — TRAZODONE HYDROCHLORIDE 50 MG: 50 TABLET ORAL at 21:02

## 2025-02-02 RX ADMIN — CARVEDILOL 6.25 MG: 6.25 TABLET, FILM COATED ORAL at 08:43

## 2025-02-02 RX ADMIN — LEVOTHYROXINE SODIUM 75 MCG: 0.05 TABLET ORAL at 06:06

## 2025-02-02 RX ADMIN — DIVALPROEX SODIUM 250 MG: 125 CAPSULE, COATED PELLETS ORAL at 08:44

## 2025-02-02 RX ADMIN — DIVALPROEX SODIUM 250 MG: 125 CAPSULE, COATED PELLETS ORAL at 21:02

## 2025-02-02 RX ADMIN — CARVEDILOL 6.25 MG: 6.25 TABLET, FILM COATED ORAL at 17:43

## 2025-02-02 ASSESSMENT — SLEEP AND FATIGUE QUESTIONNAIRES: AVERAGE NUMBER OF SLEEP HOURS: 7.75

## 2025-02-02 ASSESSMENT — PAIN SCALES - GENERAL
PAINLEVEL_OUTOF10: 0
PAINLEVEL_OUTOF10: 0

## 2025-02-02 NOTE — PLAN OF CARE
Assumed care @1930. Pt is calm and cooperative with care, A/Ox4, med compliant, and denies SI/HI/AVH. Pt is hard of hearing.  assessment conducted via written communication. Pt rates his depression 0/10  and anxiety 0/10. Pt has remained free from behaviors of self harm and is currently able to contract for safety. Pt reports receiving 8-10 hours of sleep each night. Plan of care is ongoing.      Problem: Anxiety  Goal: Will report anxiety at manageable levels  Description: INTERVENTIONS:  1. Administer medication as ordered  2. Teach and rehearse alternative coping skills  3. Provide emotional support with 1:1 interaction with staff  Outcome: Progressing     Problem: Depression  Goal: Will be euthymic at discharge  Description: INTERVENTIONS:  1. Administer medication as ordered  2. Provide emotional support via 1:1 interaction with staff  3. Encourage involvement in milieu/groups/activities  4. Monitor for social isolation  Outcome: Progressing     Problem: Sleep Disturbance  Goal: Will exhibit normal sleeping pattern  Description: INTERVENTIONS:  1. Administer medication as ordered  2. Decrease environmental stimuli, including noise, as appropriate  3. Discourage social isolation and naps during the day  Outcome: Progressing

## 2025-02-02 NOTE — PLAN OF CARE
Problem: Behavior  Goal: Pt/Family maintain appropriate behavior and adhere to behavioral management agreement, if implemented  Description: INTERVENTIONS:  1. Assess patient/family's coping skills and  non-compliant behavior (including use of illegal substances)  2. Notify security of behavior or suspected illegal substances which indicate the need for search of the family and/or belongings  3. Encourage verbalization of thoughts and concerns in a socially appropriate manner  4. Utilize positive, consistent limit setting strategies supporting safety of patient, staff and others  5. Encourage participation in the decision making process about the behavioral management agreement  6. If a visitor's behavior poses a threat to safety call refer to organization policy.  7. Initiate consult with , Psychosocial CNS, Spiritual Care as appropriate  Outcome: Progressing   Yifan has been visible in the milieu all day.  He was cooperative with his medications this morning.  Yifan has eaten all meals in the day room and ate well, % , each meal. Yifan has spent much of the day watching TV.  Communication is best done by writing, allowing Yifan to read what was written and then he answers verbally.    Yifan took his medications whole with water this morning.  He is able to swallow the capsules.  Yifan  ate the applesauce after taking the medication.

## 2025-02-03 DIAGNOSIS — E03.9 HYPOTHYROIDISM, UNSPECIFIED TYPE: ICD-10-CM

## 2025-02-03 PROCEDURE — 1240000000 HC EMOTIONAL WELLNESS R&B

## 2025-02-03 PROCEDURE — 6370000000 HC RX 637 (ALT 250 FOR IP)

## 2025-02-03 PROCEDURE — 97530 THERAPEUTIC ACTIVITIES: CPT

## 2025-02-03 PROCEDURE — 97110 THERAPEUTIC EXERCISES: CPT

## 2025-02-03 PROCEDURE — 6370000000 HC RX 637 (ALT 250 FOR IP): Performed by: PSYCHIATRY & NEUROLOGY

## 2025-02-03 PROCEDURE — 99233 SBSQ HOSP IP/OBS HIGH 50: CPT | Performed by: PSYCHIATRY & NEUROLOGY

## 2025-02-03 RX ORDER — LEVOTHYROXINE SODIUM 75 UG/1
75 TABLET ORAL DAILY
Qty: 90 TABLET | Refills: 1 | Status: SHIPPED | OUTPATIENT
Start: 2025-02-03

## 2025-02-03 RX ADMIN — DIVALPROEX SODIUM 250 MG: 125 CAPSULE, COATED PELLETS ORAL at 08:43

## 2025-02-03 RX ADMIN — CARVEDILOL 6.25 MG: 6.25 TABLET, FILM COATED ORAL at 08:42

## 2025-02-03 RX ADMIN — LOSARTAN POTASSIUM 100 MG: 100 TABLET, FILM COATED ORAL at 08:41

## 2025-02-03 RX ADMIN — ESCITALOPRAM OXALATE 20 MG: 10 TABLET ORAL at 08:40

## 2025-02-03 RX ADMIN — DIVALPROEX SODIUM 250 MG: 125 CAPSULE, COATED PELLETS ORAL at 20:31

## 2025-02-03 RX ADMIN — LEVOTHYROXINE SODIUM 75 MCG: 0.05 TABLET ORAL at 07:04

## 2025-02-03 RX ADMIN — PANTOPRAZOLE SODIUM 40 MG: 40 TABLET, DELAYED RELEASE ORAL at 20:30

## 2025-02-03 RX ADMIN — ATORVASTATIN CALCIUM 80 MG: 40 TABLET, FILM COATED ORAL at 20:31

## 2025-02-03 RX ADMIN — HYDROCHLOROTHIAZIDE 25 MG: 25 TABLET ORAL at 08:42

## 2025-02-03 NOTE — BH NOTE

## 2025-02-03 NOTE — PLAN OF CARE
Problem: Safety - Adult  Goal: Free from fall injury  Outcome: Progressing     Problem: Gena  Goal: Will exhibit normal sleep and speech and no impulsivity  Description: INTERVENTIONS:  1. Administer medication as ordered  2. Set limits on impulsive behavior  3. Make attempts to decrease external stimuli as possible  Outcome: Progressing     Problem: Behavior  Goal: Pt/Family maintain appropriate behavior and adhere to behavioral management agreement, if implemented  Description: INTERVENTIONS:  1. Assess patient/family's coping skills and  non-compliant behavior (including use of illegal substances)  2. Notify security of behavior or suspected illegal substances which indicate the need for search of the family and/or belongings  3. Encourage verbalization of thoughts and concerns in a socially appropriate manner  4. Utilize positive, consistent limit setting strategies supporting safety of patient, staff and others  5. Encourage participation in the decision making process about the behavioral management agreement  6. If a visitor's behavior poses a threat to safety call refer to organization policy.  7. Initiate consult with , Psychosocial CNS, Spiritual Care as appropriate  Outcome: Progressing     Problem: Anxiety  Goal: Will report anxiety at manageable levels  Description: INTERVENTIONS:  1. Administer medication as ordered  2. Teach and rehearse alternative coping skills  3. Provide emotional support with 1:1 interaction with staff  Outcome: Progressing

## 2025-02-03 NOTE — TELEPHONE ENCOUNTER
LOV 11/21/2024  Future Appointments   Date Time Provider Department Center   2/21/2025  1:20 PM Alexis Chan DO [place] [facility] DEP

## 2025-02-03 NOTE — BH NOTE
[facility] updated her on pt's progress and treatment plan.

## 2025-02-03 NOTE — PLAN OF CARE
Pt has been visible on the unit, friendly and social with staff and peers. BH assessment conducted via written communication d/t hearing impairment. Med compliant, A/Ox3, loosely oriented to situation. Denies pain, SI/HI/AVH, depression, and anxiety. Reports trouble falling asleep; PRN Trazodone 50 mg PO given. Pt slept uninterrupted. Plan of care ongoing.    Problem: Safety - Adult  Goal: Free from fall injury  Outcome: Progressing     Problem: Pain  Goal: Verbalizes/displays adequate comfort level or baseline comfort level  Outcome: Progressing     Problem: Anxiety  Goal: Will report anxiety at manageable levels  Description: INTERVENTIONS:  1. Administer medication as ordered  2. Teach and rehearse alternative coping skills  3. Provide emotional support with 1:1 interaction with staff  Outcome: Progressing     Problem: Depression  Goal: Will be euthymic at discharge  Description: INTERVENTIONS:  1. Administer medication as ordered  2. Provide emotional support via 1:1 interaction with staff  3. Encourage involvement in milieu/groups/activities  4. Monitor for social isolation  Outcome: Progressing

## 2025-02-04 ENCOUNTER — PATIENT MESSAGE (OUTPATIENT)
Dept: FAMILY MEDICINE CLINIC | Age: 85
End: 2025-02-04

## 2025-02-04 VITALS
SYSTOLIC BLOOD PRESSURE: 124 MMHG | DIASTOLIC BLOOD PRESSURE: 63 MMHG | WEIGHT: 179 LBS | HEART RATE: 60 BPM | RESPIRATION RATE: 16 BRPM | BODY MASS INDEX: 28.09 KG/M2 | OXYGEN SATURATION: 92 % | HEIGHT: 67 IN | TEMPERATURE: 96.9 F

## 2025-02-04 LAB
AMMONIA PLAS-SCNC: 17 UMOL/L (ref 16–60)
VALPROATE SERPL-MCNC: 44 UG/ML (ref 50–100)

## 2025-02-04 PROCEDURE — 5130000000 HC BRIDGE APPOINTMENT

## 2025-02-04 PROCEDURE — 6370000000 HC RX 637 (ALT 250 FOR IP): Performed by: PSYCHIATRY & NEUROLOGY

## 2025-02-04 PROCEDURE — 36415 COLL VENOUS BLD VENIPUNCTURE: CPT

## 2025-02-04 PROCEDURE — 6370000000 HC RX 637 (ALT 250 FOR IP)

## 2025-02-04 PROCEDURE — 82140 ASSAY OF AMMONIA: CPT

## 2025-02-04 PROCEDURE — 80164 ASSAY DIPROPYLACETIC ACD TOT: CPT

## 2025-02-04 PROCEDURE — 99239 HOSP IP/OBS DSCHRG MGMT >30: CPT | Performed by: PSYCHIATRY & NEUROLOGY

## 2025-02-04 RX ORDER — ESCITALOPRAM OXALATE 20 MG/1
20 TABLET ORAL DAILY
Qty: 30 TABLET | Refills: 3 | Status: SHIPPED | OUTPATIENT
Start: 2025-02-04

## 2025-02-04 RX ORDER — DIVALPROEX SODIUM 125 MG/1
250 CAPSULE, COATED PELLETS ORAL 2 TIMES DAILY
Qty: 120 CAPSULE | Refills: 0 | Status: SHIPPED | OUTPATIENT
Start: 2025-02-04

## 2025-02-04 RX ORDER — PANTOPRAZOLE SODIUM 40 MG/1
40 TABLET, DELAYED RELEASE ORAL NIGHTLY
Qty: 30 TABLET | Refills: 0 | Status: SHIPPED | OUTPATIENT
Start: 2025-02-04

## 2025-02-04 RX ADMIN — LOSARTAN POTASSIUM 100 MG: 100 TABLET, FILM COATED ORAL at 09:28

## 2025-02-04 RX ADMIN — DIVALPROEX SODIUM 250 MG: 125 CAPSULE, COATED PELLETS ORAL at 09:29

## 2025-02-04 RX ADMIN — LEVOTHYROXINE SODIUM 75 MCG: 0.05 TABLET ORAL at 06:37

## 2025-02-04 RX ADMIN — ESCITALOPRAM OXALATE 20 MG: 10 TABLET ORAL at 09:28

## 2025-02-04 NOTE — PLAN OF CARE
Problem: Safety - Adult  Goal: Free from fall injury  2/4/2025 1006 by Tika Ramos, RN  Outcome: Progressing  2/3/2025 2039 by Vonnie Graves, RN  Outcome: Progressing     Problem: Pain  Goal: Verbalizes/displays adequate comfort level or baseline comfort level  2/3/2025 2039 by Vonnie Graves, RN  Outcome: Progressing     Problem: Anxiety  Goal: Will report anxiety at manageable levels  Description: INTERVENTIONS:  1. Administer medication as ordered  2. Teach and rehearse alternative coping skills  3. Provide emotional support with 1:1 interaction with staff  2/4/2025 1006 by Tika Ramos, RN  Outcome: Progressing  2/3/2025 2039 by Vonnie Graves, RN  Outcome: Progressing

## 2025-02-04 NOTE — BH NOTE
[facility]  Treatment Team Note  Review Date & Time: 2/4/2025  9:16 am    Patient was not in treatment team      Status EXAM:   Mental Status and Behavioral Exam  Normal: No  Level of Assistance: Independent/Self  Facial Expression: Worried, Brightened  Affect: Congruent  Level of Consciousness: Alert  Frequency of Checks: 4 times per hour, close  Mood:Normal: Yes  Mood: Labile, Suspicious, Worthless, low self-esteem, Depressed  Motor Activity:Normal: Yes  Motor Activity: Increased  Eye Contact: Good  Observed Behavior: Impulsive, Cooperative, Friendly  Sexual Misconduct History: Current - no  Preception: [place] to person, [place] to time, [place] to place  Attention:Normal: Yes  Attention: Distractible  Thought Processes: Perseveration  Thought Content:Normal: Yes  Thought Content: Paranoia, Poverty of content  Depression Symptoms: Feelings of worthlessness  Anxiety Symptoms: Generalized  Gena Symptoms: Poor judgment  Hallucinations: None (Patient denies)  Delusions: No  Delusions: Paranoid, Persecutory  Memory:Normal: Yes  Memory: Other (comment)  Insight and Judgment: No  Insight and Judgment: Poor judgment, Poor insight      Suicide Risk CSSR-S:  1) Within the past month, have you wished you were dead or wished you could go to sleep and not wake up? : No  2) Have you actually had any thoughts of killing yourself? : No  6) Have you ever done anything, started to do anything, or prepared to do anything to end your life?: No      PLAN/TREATMENT RECOMMENDATIONS UPDATE: Patient will take medication as prescribed, eat 75% of meals, attend groups, participate in milieu activities, participate in treatment team and care planning for discharge and follow up.           Pam Conte RN

## 2025-02-04 NOTE — TRANSITION OF CARE
[facility] Transition Record    Patient Name: Yifan Cormier  YOB: 1940   Medical Record Number: [id number]  Date of Admission: 1/29/2025  3:56 PM   Date of Discharge: 02/04/2025    Attending Provider: Dr. Alexis Almeida MD  Discharging Provider: Dr. Alexis Almeida MD  To contact this individual call [phone] and ask the  to page.  If unavailable, ask to be transferred to Behavioral Health Provider on call.  A Behavioral Health Provider will be available on call 24/7 and during holidays.    Primary Care Provider: Alexis Chan DO    Allergies   Allergen Reactions    Penicillins Hives and Other (See Comments)       Reason for Admission: An 85 yo male admitted from the ED after being verbally aggressive and making threats to get a gun and \"blow-up\" his wife's nursing home. He also endorsed/presented with poor impulse control, irritability, depressed mood, poor insight, and poor judgment. Symptoms have been worsening over the last month and were precipitated by conflict with staff at his wife's nursing home.     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 This crisis line is available 24 hours a day, seven days a week.  The National Suicide and Crisis Hotline Number is 988.  You can call, chat, or text this number at any time to access emergency mental health services.      Follow-up Information       Follow up With Specialties Details Why Contact Info    Alexis Chan DO Family Medicine Follow up Hospital Follow-Up: Friday, February 7, 2025 at 11:00 am [address] [place] [address]  [phone]      [facility]  Go on 2/12/2025 Appointment Details: Wednesday,

## 2025-02-04 NOTE — PLAN OF CARE
Problem: Safety - Adult  Goal: Free from fall injury  2/4/2025 1054 by Tika Ramos RN  Outcome: Adequate for Discharge  2/4/2025 1054 by Tika Ramos RN  Outcome: Adequate for Discharge  2/4/2025 1006 by Tika Ramos RN  Outcome: Progressing     Problem: ABCDS Injury Assessment  Goal: Absence of physical injury  Outcome: Adequate for Discharge     Problem: Pain  Goal: Verbalizes/displays adequate comfort level or baseline comfort level  Outcome: Adequate for Discharge     Problem: Gena  Goal: Will exhibit normal sleep and speech and no impulsivity  Description: INTERVENTIONS:  1. Administer medication as ordered  2. Set limits on impulsive behavior  3. Make attempts to decrease external stimuli as possible  2/4/2025 1054 by Tika Ramos RN  Outcome: Adequate for Discharge  2/4/2025 1006 by Tika Ramos RN  Outcome: Progressing     Problem: Behavior  Goal: Pt/Family maintain appropriate behavior and adhere to behavioral management agreement, if implemented  Description: INTERVENTIONS:  1. Assess patient/family's coping skills and  non-compliant behavior (including use of illegal substances)  2. Notify security of behavior or suspected illegal substances which indicate the need for search of the family and/or belongings  3. Encourage verbalization of thoughts and concerns in a socially appropriate manner  4. Utilize positive, consistent limit setting strategies supporting safety of patient, staff and others  5. Encourage participation in the decision making process about the behavioral management agreement  6. If a visitor's behavior poses a threat to safety call refer to organization policy.  7. Initiate consult with , Psychosocial CNS, Spiritual Care as appropriate  Outcome: Adequate for Discharge     Problem: Anxiety  Goal: Will report anxiety at manageable levels  Description: INTERVENTIONS:  1. Administer medication as ordered  2. Teach and rehearse alternative coping

## 2025-02-04 NOTE — BH NOTE
Patient discharged via wheelchair. Transport via cab. All belongings returned to patient and he states he has all his belongings. Does refuse and is adamant about taking his hearing aides. Yells when staff try to tell him that is his property and he needs to take them, again, yells and refuses to take this. Hearing aides will be taken to outpatient to be mailed to patient. Denies SI/HI. Denies AVOT-H. Reviewed AVS/discharge paperwork with patient and he verbalizes an understanding of all information.

## 2025-02-04 NOTE — BH NOTE
Patient alert and oriented x3. Patient visible on the milieu watching T.V. Patient denies SI/HI/A/V/H. Patient took HS medications. Patient ambulates with a walker gait steady. No behaviors noted. No c/o's voiced @ present.

## 2025-02-04 NOTE — PROGRESS NOTES
Behavioral Services  Medicare Certification Upon Admission    I certify that this patient's inpatient psychiatric hospital admission is medically necessary for:    [x] (1) Treatment which could reasonably be expected to improve this patient's condition,       [x] (2) Or for diagnostic study;     AND     [x](2) The inpatient psychiatric services are provided while the individual is under the care of a physician and are included in the individualized plan of care.    Estimated length of stay/service 7 d    Plan for post-hospital care NH    Electronically signed by LAUREN SCHAEFFER MD on 1/30/2025 at 12:19 PM      
 [facility]  [facility]mission Note     Admission Type:   Admission Type: Involuntary    Reason for admission:  Reason for Admission: Dementia with behavioral disturbance.      Addictive Behavior:   Addictive Behavior  In the Past 3 Months, Have You Felt or Has Someone Told You That You Have a Problem With  : None    Medical Problems:   Past Medical History:   Diagnosis Date    CAD (coronary artery disease)     Diabetes (HCC)     Stroke (HCC)        Status EXAM:  Mental Status and Behavioral Exam  Normal: No  Level of Assistance: Independent/Self  Facial Expression: Exaggerated, Worried  Affect: Congruent  Level of Consciousness: Alert  Frequency of Checks: 4 times per hour, close  Mood:Normal: No  Mood: Anxious, Sad  Motor Activity:Normal: No  Motor Activity: Decreased  Eye Contact: Good  Observed Behavior: Friendly, Cooperative, Preoccupied  Sexual Misconduct History: Current - no  Preception: [place] to person, Patterson to time, [place] to place  Attention:Normal: No  Attention: Distractible  Thought Processes: Circumstantial  Thought Content:Normal: No  Thought Content: Poverty of content  Depression Symptoms: Impaired concentration, Increased irritability  Anxiety Symptoms: Generalized  Gena Symptoms: No problems reported or observed.  Hallucinations: None  Delusions: No  Memory:Normal: No  Memory: Poor recent, Poor remote  Insight and Judgment: No  Insight and Judgment: Poor judgment, Poor insight    Tobacco Screening:  Practical Counseling, on admission, jl X, if applicable and completed (first 3 are required if patient doesn't refuse):            (x ) Recognizing danger situations (included triggers and roadblocks)                    ( x) Coping skills (new ways to manage stress,relaxation techniques, changing routine, distraction)                                                           ( ) Basic information about quitting (benefits of quitting, techniques in how to quit, available resources  ( ) 
2029: A PRN dose of Trazodone, 50mg PO given for sleep. Will monitor pt for med effectiveness, and give care as necessary.  
4 Eyes Skin Assessment     The patient is being assessed for  Admission    I agree that 2 RN's have performed a thorough Head to Toe Skin Assessment on the patient. ALL assessment sites listed below have been assessed.       Areas assessed for pressure by both nurses:   [x]   Head, Face, and Ears   [x]   Shoulders, Back, and Chest  [x]   Arms, Elbows, and Hands   [x]   Coccyx, Sacrum, and Ischum  [x]   Legs, Feet, and Heels                                Skin Assessed Under all Medical Devices by both nurses:  None present.                All Mepilex Borders were peeled back and area peeked at by both nurses:  No: none present  Please list where Mepilex Borders are located:  None present                  Does the Patient have Skin Breakdown related to pressure?  No            Nicolas Prevention initiated:  No   Wound Care Orders initiated:  No      [facility] nurse consulted for Pressure Injury (Stage 3,4, Unstageable, DTI, NWPT, Complex wounds)and New or Established Ostomies:  NA        Nurse 1 eSignature: Electronically signed by Bekah Weir RN on 1/30/25 at 12:11 AM EST    **SHARE this note so that the co-signing nurse is able to place an eSignature**    Nurse 2 eSignature: Electronically signed by Stacey Hewitt LPN on 1/30/25 at 12:14 AM EST   
Bedside Mobility Assessment Tool (BMAT):     Assessment Level 1- Sit and Shake    1. From a semi-reclined position, ask patient to sit up and rotate to a seated position at the side of the bed. Can use the bedrail.    2. Ask patient to reach out and grab your hand and shake making sure patient reaches across his/her midline.   Pass- Patient is able to come to a seated position, maintain core strength. Maintains seated balance while reaching across midline. Move on to Assessment Level 2.     Assessment Level 2- Stretch and Point   1. With patient in seated position at the side of the bed, have patient place both feet on the floor (or stool) with knees no higher than hips.    2. Ask patient to stretch one leg and straighten the knee, then bend the ankle/flex and point the toes. If appropriate, repeat with the other leg.   Pass- Patient is able to demonstrate appropriate quad strength on intended weight bearing limb(s). Move onto Assessment Level 3.     Assessment Level 3- Stand   1. Ask patient to elevate off the bed or chair (seated to standing) using an assistive device (cane, bedrail).    2. Patient should be able to raise buttocks off be and hold for a count of five. May repeat once.   Pass- Patient maintains standing stability for at least 5 seconds, proceed to assessment level 4.    Assessment Level 4- Walk   1. Ask patient to march in place at bedside.    2. Then ask patient to advance step and return each foot. Some medical conditions may render a patient from stepping backwards, use your best clinical judgement.   Fail- Patient not able to complete tasks OR requires use of assistive device. Patient is MOBILITY LEVEL 3.       Mobility Level- 3  
Department of Psychiatry  AttendingProgress Note  Chief Complaint: depression   Yifan has been relatively well behaved since admission. He is currently in room on his bed. He met with OT today. Home with initial 24/7 supervision, PRN assistance, and HHOT   Had Trazodone last  night for insomnia.   MOCA 23/30.  Does not appear to have a significant cognitive impairment.  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  There are scattered  areas of hypoattenuation in the supratentorial white matter.   Continue Lexapro 20 mg QD. Will begin a trial with Depakote  mg BID for agitation  Patient exhibits capacity at this time  May benefit from assisted living .     Patient's chart was reviewed and collaborated with  about the treatment plan.  SUBJECTIVE:    Patient is feeling better. Suicidal ideation:  denies suicidal ideation.  Patient does not have medication side effects.    ROS: Patient has new complaints: no  Sleeping adequately:  Yes   Appetite adequate: Yes  Attending groups: Yes  Visitors:No    OBJECTIVE    Physical  VITALS:  /77   Pulse 58   Temp 98.6 °F (37 °C) (Oral)   Resp 18   Ht 1.702 m (5' 7.01\")   Wt 81.2 kg (179 lb)   SpO2 99%   BMI 28.03 kg/m²     Mental Status Examination:  Patients appearance was street clothes. Thoughts are Paucity of Ideas. Homicidal ideations none.  No abnormal movements, tics or mannerisms.  Memory intact Aims 0. Concentration Poor.   Alert and oriented X 4. Insight and Judgement impaired insight. Patient was cooperative. Patient gait normal. Mood labile, affect labile affect Hallucinations Absent, suicidal ideations no specific plan to harm self Speech normal volume  Data  Labs:   Admission on 01/29/2025   Component Date Value Ref Range Status    WBC 01/29/2025 6.6  4.0 - 11.0 K/uL Final    RBC 01/29/2025 4.87  4.20 - 5.90 M/uL Final    Hemoglobin 01/29/2025 14.5  13.5 - 17.5 g/dL Final    Hematocrit 01/29/2025 43.5  
Department of Psychiatry  AttendingProgress Note  Chief Complaint: mood lability  Yifan has been cooperative on unit. He appears irritable affectively and wants to see is wife and is concerned about her welfare. He stated that he is not allowed to see her or call her based on his recent behavior at her NH.   Victoriano does not show evidence of dementia and is able to follow directions and think logically .Will dc home at DC.  Tolerating the Depakote sprinkles 250 mg BID.   Anticipate that he will want to leave son. Will check Level and Ammonia 2/4.     Very irritable and labile today. He is angry about his home and feels that his son dropped the ball on fixing his home. He wants to sell home and move to a condo. He is trying to find a way to keep the money from the home and  not give nay to the NH.   Patient's chart was reviewed and collaborated with  about the treatment plan.  SUBJECTIVE:    Patient is feeling unchanged. Suicidal ideation:  denies suicidal ideation.  Patient does not have medication side effects.    ROS: Patient has new complaints: no  Sleeping adequately:  Yes   Appetite adequate: Yes  Attending groups: Yes  Visitors:No    OBJECTIVE    Physical  VITALS:  /65   Pulse 68   Temp 97.5 °F (36.4 °C) (Temporal)   Resp 18   Ht 1.702 m (5' 7.01\")   Wt 81.2 kg (179 lb)   SpO2 96%   BMI 28.03 kg/m²     Mental Status Examination:  Patients appearance was street clothes. Thoughts are Goal directed. Homicidal ideations none.  No abnormal movements, tics or mannerisms.  Memory intact Aims 0. Concentration Fair.   Alert and oriented X 4. Insight and Judgement impaired insight. Patient was cooperative. Patient gait normal. Mood labile, affect labile affect Hallucinations Absent, suicidal ideations no specific plan to harm self Speech normal volume  Data  Labs:   Admission on 01/29/2025   Component Date Value Ref Range Status    WBC 01/29/2025 6.6  4.0 - 11.0 K/uL Final    RBC 01/29/2025 4.87  
Inpatient Occupational Therapy Evaluation & Treatment    Unit: [facility]  Date:  1/31/2025  Patient Name:    Yifan Cormier  Admitting diagnosis:  Dementia with behavioral disturbance (HCC) [F03.918]  Homicidal behavior [R45.850]  Threatening behavior [R46.89]  Admit Date:  1/29/2025  Precautions/Restrictions/WB Status/ Lines/ Wounds/ Oxygen: Fall risk, [place] (hard of hearing), and Standard BHI Precautions    Treatment Time:  09:39-10:12  Treatment Number:  1  Timed Code Treatment Minutes: 23 minutes  Total Treatment Minutes: 33 minutes    Patient Goals for Therapy: \"to get better\"          Discharge Recommendations:  Home with initial 24/7 supervision, PRN assistance, and HHOT  DME needs for discharge: Needs Met       Therapy recommendations for staff:   Stand by assist for ambulation with use of rolling walker (RW) to/from bathroom  within room  within community room    History of Present Illness: Per Psychiatry H&P from Dr. Almeida on 1/30  \"Patient seen in room on Adult Behavioral Unit.   Patient is a 84 y.o. male who presented to the ED at [facility] from home with police after he had threatened to get a gun and blow up a NH.   His wife is currently in [place] and Yifan is upset as he believes it to nor be a NH. He gave along story of how he had called the state and others to find out and the agency [facility] . He apparently is fixated on this issue and had been a behavioral problem at [facility].   He minimized his behaviors and stated that he didn't plan to hurt anyone. He stated that he had brought the knife to the NH in order for her to be able to cut her meat. He stated that the  Armen is not nice and didn't like his behavior with bringing gifts for the staff.      he was oriented to place, date and age.      Per Telepsych eval  Collateral:  KAYLEEN Tuttle, [phone]  \"I had the case involving his wife last year, she was bed bound, 
Is alert and oriented x 4. Visible on milieu, Socializes with peers. Speaks loudly due to hearing impairment. Is cooperative with care, is able to voice wants and needs. Enjoys watching television.Gets a little agitated at times when asking when the Dr will see him and when he will get to go home, states he was told that he would go home today. Appetite is adequate, fluid intake is adequate. Denies SI/HI. Denies AVOT-H.. No noted or reported medication side effects. Ambulates with a rollator walker, adheres to standard safety measures.Plan of care is ongoing.  
Patient arrived from the ED via wheelchair. Patient alert and oriented x 3. Denies SI/HI, AVH. Denies pain. No response to internal stimulus noted. Patient noted as very hard of hearing. Has hearing aid to right ear. Patient communicates best with written messages. Patient uses a cane at home. Reports having a recent fall. Patient given a walker to use. Gait noted as steady. Patient noted as irritable with being admitted, but cooperative with care. Patient oriented to room and unit. Provided patient packet. Remains involuntary at this time.   
Patient is A & O x4. Is elevated, can  be irritable at times, speaks loudly due to be very hard of hearing. Is not wearing his hearing aides, they have been broken, he has blamed staff for this.States he will not be taking the hearing aides home with him. Can be inappropriate at times, this includes being sexually inappropriate with a female peer. Can be redirected successfully with this. Has been adamant today that he was suppose to leave at 7am because he has an appt with an  at 0900. Refused labs this am, he did change his mind though and allowed the lab to draw his blood. Held Coreg due to vitals being so close to parameters. Appetite and fluid intake have been adequate. Is preparing for discharge.Denies SI/HI. Denies AVOT-H..   
Tested  Functional Mobility:   Supervision to ambulate in community room with the use of a RW    ADLs:  Dressing:      UE:   Not Tested  LE:    Independent to amy/doff socks seated EOB    Bathing:    UE:  Not Tested  LE:  Not Tested    Eating:   Independent with beverage management    Toileting:  Not Tested    Grooming/hygiene: Not Tested    Ther Ex / Activities Initiated:   Shoulder flex/ext:  x10  Shoulder abd/add:  x10  Shoulder horizontal abd/add:  x10  Scapular retraction:  x10  Chair push ups x10  Wall push ups: x10  Wall slides: x10    Positioning Needs:   Pt in common room     Patient/Family Education:   Pt educated on role of inpatient OT, plan of care, importance of continued activity, DC recommendations, functional transfer/mobility safety, transfer techniques, and calling for assist with mobility    CHF Education  N/A    Assessment:  Pt seen for occupational therapy treatment in the acute care setting this date.  Patient completing ADLs with independence and mobility with supervision for safety. Patient is functioning close to baseline. No further acute OT needs at this time.      Recommending Home 24 hr supervision upon discharge as patient functioning close to baseline level    Goal(s) :   To be met in 3 Visits:  Bed to toilet/BSC:       Supervision- GOAL MET 2/3  Pt will complete 3/3 CHF goals     N/A    To be met in 5 Visits:  Supine to/from Sit in preparation for ADL task:   Independent- GOAL MET 2/3  Toileting        Independent- GOAL MET 2/3  Grooming       Independent- GOAL MET 2/3  Upper Body Dressing:      Independent- GOAL MET 2/3  Lower Body Dressing:      Independent- GOAL MET 2/3  Pt to demonstrate UE therapeutic exs x 15 reps with minimal cues    Rehabilitation Potential: Good  Strengths for achieving goals include: PLOF and Pt cooperative   Barriers to achieving goals include:  Complexity of condition    Plan: DC from acute OT    Electronically signed by Opal Zacarias OT      If 
collected if clinically  indicated.      Drug Screen Comment: 01/29/2025 see below   Final    Comment: This method is a screening test to detect only these drug  classes as part of a medical workup.  Confirmatory testing  by another method should be ordered if clinically indicated.      Acetaminophen Level 01/29/2025 <5 (L)  10 - 30 ug/mL Final    Comment: Therapeutic Range: 10.0-30.0 ug/mL  Toxic: >=150 ug/mL      Ethanol Lvl 01/29/2025 None Detected  mg/dL Final    Comment:    None Detected  Conversion factor:  100 mg/dl = .100 g/dl  For Medical Purposes Only      Salicylate Lvl 01/29/2025 <0.5 (L)  15.0 - 30.0 mg/dL Final    Comment: Therapeutic Range: 15.0-30.0 mg/dL  Toxic: >30.0 mg/dL      NT Pro-BNP 01/29/2025 150  0 - 449 pg/mL Final    Ventricular Rate 01/29/2025 54  BPM Final    QRS Duration 01/29/2025 86  ms Final    Q-T Interval 01/29/2025 462  ms Final    QTc Calculation (Bazett) 01/29/2025 438  ms Final    R Axis 01/29/2025 7  degrees Final    T Axis 01/29/2025 12  degrees Final    Diagnosis 01/29/2025    Final                    Value:Sinus bradycardiaPossible Inferior infarct (cited on or before 29-JAN-2025)Abnormal ECGWhen compared with ECG of 17-NOV-2008 12:21,Nonspecific T wave abnormality has replaced inverted T waves in Inferior leadsConfirmed by GILA FERMIN ([id number]) on 1/30/2025 4:11:08 PM      SARS-CoV-2 RNA, RT PCR 01/29/2025 NOT DETECTED  NOT DETECTED Final    Comment: Not Detected results do not preclude SARS-CoV-2 infection and  should not be used as the sole basis for patient management  decisions.  Results must be combined with clinical observations,  patient history, and epidemiological information.  Testing was performed using DIOGO YOVANY SARS-CoV-2, Influenza A/B  and Respiratory Syncytial Virus nucleic acid assay. This  test is a multiplex Real-Time Reverse Transcriptase Polymerase Chain  Reaction (RT-PCR)-based in vitro diagnostic test intended for the  qualitative detection of

## 2025-02-04 NOTE — BH NOTE
Bridge Appointment completed: Reviewed Discharge Instructions with patient.    Patient verbalizes understanding and agreement with the discharge plan using the teachback method.     Referral for Outpatient Tobacco Cessation Counseling, upon discharge (jl X if applicable and completed):    ( )  Hospital staff assisted patient to call Quit Line or faxed referral                                   during hospitalization                  ( )  Recognizing danger situations (included triggers and roadblocks), if not completed on admission                    ( )  Coping skills (new ways to manage stress, exercise, relaxation techniques, changing routine, distraction), if not completed on admission                                                           ( )  Basic information about quitting (benefits of quitting, techniques in how to quit, available resources, if not completed on admission  ( ) [facility]   ( X) Patient refused referral  ( X) Patient refused counseling  ( X) Patient refused smoking cessation medication upon discharge    Vaccinations (jl X if applicable and completed):  ( ) Patient states already received influenza vaccine elsewhere  ( ) Patient received influenza vaccine during this hospitalization  ( X) Patient refused influenza vaccine at this time  ( ) Not offered

## 2025-02-04 NOTE — DISCHARGE SUMMARY
Discharge Summary   Admit Date: 1/29/2025   Discharge Date:  2/4/2025    Condition at DC stable    Spent over 40 minutes with patient and staff on DCplanning with more than 50 % of time spent with patient discussing care  Final Dx: axis I: Unspecified mood (affective) disorder (HCC)   Axis 2: No diagnosis  [place] 3: See Medical History    And Present on Admission:   Coronary artery disease involving native heart without angina pectoris   Mixed hyperlipidemia   Essential hypertension, benign   Threatening behavior   Unspecified mood (affective) disorder (HCC)     Axis 4: Problems related to the social environment  Axis 5:  On Admission: 41-50 serious symptoms At Discharge: 51-60 moderate symptoms   All conditions on Axis 1 and Axis 2 and active problems on Axis 3 were treated while patient was hospitalized. (  Active Hospital Problems    Diagnosis Date Noted    Threatening behavior [R46.89] 01/30/2025    Unspecified mood (affective) disorder (HCC) [F39] 01/30/2025    Coronary artery disease involving native heart without angina pectoris [I25.10] 10/01/2016    Mixed hyperlipidemia [E78.2] 09/07/2012    Essential hypertension, benign [I10] 09/07/2012   )   Condition on DC  Mood and affect are stable and pt is not suicidal   VITALS:  /63   Pulse 60   Temp 96.9 °F (36.1 °C) (Temporal)   Resp 16   Ht 1.702 m (5' 7.01\")   Wt 81.2 kg (179 lb)   SpO2 92%   BMI 28.03 kg/m²   Brief Summary Present Illness     CC:  mood lability     HPI:   Patient seen in room on Adult Behavioral Unit.   Patient is a 84 y.o. male who presented to the ED at [facility] from home with police after he had threatened to get a gun and blow up a NH.   His wife is currently in [place] and Yifan is upset as he believes it to nor be a NH. He gave along story of how he had called the state and others to find out and the agency told him that [place] was not on the list of NH . He apparently is fixated on this issue and

## 2025-02-04 NOTE — PLAN OF CARE
Problem: Safety - Adult  Goal: Free from fall injury  2/3/2025 2039 by Vonnie Graves RN  Outcome: Progressing  2/3/2025 1205 by Tika Ramos RN  Outcome: Progressing     Problem: ABCDS Injury Assessment  Goal: Absence of physical injury  Outcome: Progressing     Problem: Pain  Goal: Verbalizes/displays adequate comfort level or baseline comfort level  Outcome: Progressing     Problem: Gena  Goal: Will exhibit normal sleep and speech and no impulsivity  Description: INTERVENTIONS:  1. Administer medication as ordered  2. Set limits on impulsive behavior  3. Make attempts to decrease external stimuli as possible  2/3/2025 2039 by Vonnie Graves RN  Outcome: Progressing  2/3/2025 1205 by Tika Ramos RN  Outcome: Progressing     Problem: Behavior  Goal: Pt/Family maintain appropriate behavior and adhere to behavioral management agreement, if implemented  Description: INTERVENTIONS:  1. Assess patient/family's coping skills and  non-compliant behavior (including use of illegal substances)  2. Notify security of behavior or suspected illegal substances which indicate the need for search of the family and/or belongings  3. Encourage verbalization of thoughts and concerns in a socially appropriate manner  4. Utilize positive, consistent limit setting strategies supporting safety of patient, staff and others  5. Encourage participation in the decision making process about the behavioral management agreement  6. If a visitor's behavior poses a threat to safety call refer to organization policy.  7. Initiate consult with , Psychosocial CNS, Spiritual Care as appropriate  2/3/2025 2039 by Vonnie Graves RN  Outcome: Progressing  2/3/2025 1205 by Tika Ramos RN  Outcome: Progressing     Problem: Anxiety  Goal: Will report anxiety at manageable levels  Description: INTERVENTIONS:  1. Administer medication as ordered  2. Teach and rehearse alternative coping skills  3. Provide

## 2025-02-04 NOTE — BH NOTE
[facility]  Discharge Note    Pt discharged with followings belongings:   Dental Appliances: Uppers, Lowers  Vision - Corrective Lenses: None  Hearing Aid: Right hearing aid  Jewelry: None  Body Piercings Removed: N/A  Clothing: Pants, Shirt, Footwear, Other (Comment) (grey sweat suit with white tshirt)  Other Valuables: Other (Comment)   Valuables sent home with YES or returned to patient. Patient educated on aftercare instructions: YES  Information faxed to  by   at 10:50 AM .Patient verbalize understanding of AVS:  YES.    Status EXAM upon discharge:  Mental Status and Behavioral Exam  Normal: No  Level of Assistance: Independent/Self  Facial Expression: Elevated, Worried  Affect: Congruent  Level of Consciousness: Alert  Frequency of Checks: 4 times per hour, close  Mood:Normal: No  Mood: Anxious, Irritable  Motor Activity:Normal: Yes  Motor Activity: Increased  Eye Contact: Good  Observed Behavior: Cooperative  Sexual Misconduct History: Current - no  Preception: [place] to person, [place] to time, [place] to place, [place] to situation  Attention:Normal: No  Attention: Distractible  Thought Processes: Perseveration  Thought Content:Normal: No  Thought Content: Preoccupations (can be sexually preoccupied at times)  Depression Symptoms: Increased irritability  Anxiety Symptoms: Generalized  Gena Symptoms: Hypersexuality  Hallucinations: None  Delusions: No  Delusions: Paranoid, Persecutory  Memory:Normal: Yes  Memory: Confabulation  Insight and Judgment: No  Insight and Judgment: Poor judgment, Poor insight    Tobacco Screening:  Practical Counseling, on admission, jl X, if applicable and completed (first 3 are required if patient doesn't refuse)Former smoker:            ( ) Recognizing danger situations (included triggers and roadblocks)                    ( ) Coping skills (new ways to manage stress,relaxation techniques, changing routine, distraction)

## 2025-02-04 NOTE — DISCHARGE INSTRUCTIONS
[facility] Financial Resources*  (Call [employer]/211 if need more resources.)      [employer] 211   Speak to a trained professional 24/7 who can connect you to essential community services including food, clothing, transportation, housing, utilities, employment services, childcare, and baby supplies. 211 serves nationwide.   [facility].Exose[employer] for resources in [place], [facility], [place] and [facility] in [place]; [place], [place], [place], and [facility] in [place].   [place] [employer]/resources for resources in [place], [place], [place], [place], [place], [place], [place], [place], [facility], [place], [place], and [facility] in [place].     "[employer]" Financial Assistance  What they offer: Financial assistance programs that are designed to assist you in finding resources that may help pay your hospital bill. Please click on the links below to learn more about the financial assistance programs available within our regions.  Phone Number: [phone]  How to apply for the [facility] Financial Assistance Program:       Option 1: To apply for financial assistance, a patient (or their family or other provider) should fill out the Financial Assistance Application. Copies of the Financial Assistance Application and the FAP may be obtained for free by calling the [facility] Customer Service department at [phone]   Option 2: The Financial Assistance Application and policy may be obtained for free by downloading a copy from the "[employer]" website:  https://www.[employer]/patient-resources/financial-assistance  [place] Health Care Assurance Program  What they offer:  Patients who need hospital care, but are unable to pay for it, may be eligible for free or reduced fee care at [facility] through the Hospital Care Assurance Program (HCAP). Applications for HCAP are accepted by the hospital where care was received, and patients seeking HCAP assistance should contact their hospital’s billing department for

## 2025-02-04 NOTE — BH NOTE
Spoke with staff at pt's PCP and scheduled hospital follow-up appointment, provided clinical for continuity of care, and provided contact info for Marry with [facility].           Met with pt and discussed aftercare plans and the importance of follow-up with psychiatry. Pt reports understanding and stated he would follow-up with the scheduled psychiatry appointment.             Spoke with Marry with [facility] and updated her on pt's DC today.

## 2025-02-07 ENCOUNTER — OFFICE VISIT (OUTPATIENT)
Dept: FAMILY MEDICINE CLINIC | Age: 85
End: 2025-02-07

## 2025-02-07 ENCOUNTER — TELEPHONE (OUTPATIENT)
Dept: FAMILY MEDICINE CLINIC | Age: 85
End: 2025-02-07

## 2025-02-07 VITALS
WEIGHT: 190.8 LBS | TEMPERATURE: 97.8 F | SYSTOLIC BLOOD PRESSURE: 121 MMHG | DIASTOLIC BLOOD PRESSURE: 67 MMHG | RESPIRATION RATE: 16 BRPM | HEART RATE: 54 BPM | BODY MASS INDEX: 29.88 KG/M2 | OXYGEN SATURATION: 93 %

## 2025-02-07 DIAGNOSIS — Z09 HOSPITAL DISCHARGE FOLLOW-UP: Primary | ICD-10-CM

## 2025-02-07 RX ORDER — TRAZODONE HYDROCHLORIDE 50 MG/1
50 TABLET, FILM COATED ORAL NIGHTLY
COMMUNITY
Start: 2025-01-30
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
## 2025-02-13 ENCOUNTER — TELEPHONE (OUTPATIENT)
Dept: FAMILY MEDICINE CLINIC | Age: 85
End: 2025-02-13

## 2025-02-13 DIAGNOSIS — Z01.89 ENCOUNTER FOR GERIATRIC ASSESSMENT: Primary | ICD-10-CM

## 2025-02-13 NOTE — TELEPHONE ENCOUNTER
Spoke with patient and informed of message. Gave information to schedule. He will let Marry know when his appointment is.

## 2025-02-13 NOTE — TELEPHONE ENCOUNTER
Marry  form [facility] stopped by with Referral sheet for Geriatric Assessment requested by the court. Scanned into media. She recommended starting with #5 on the list [facility]. Please place referral for Geriatric Assessment

## 2025-02-14 NOTE — TELEPHONE ENCOUNTER
[facility] does not do the geriatric assessment, Called [facility], they can do the assesment. Left vm with Patient's contact info for him to be scheduled. Patient notified. Please fax new referral to [phone].     [facility]   [address] [place] [address], [address]  [place], [place] [address] ([place])  [phone]    Please sign pended referral

## 2025-02-17 DIAGNOSIS — J01.90 ACUTE SINUSITIS, RECURRENCE NOT SPECIFIED, UNSPECIFIED LOCATION: ICD-10-CM

## 2025-02-17 RX ORDER — CETIRIZINE HYDROCHLORIDE 5 MG/1
5 TABLET ORAL DAILY
Qty: 90 TABLET | Refills: 1 | Status: SHIPPED | OUTPATIENT
Start: 2025-02-17

## 2025-02-17 NOTE — TELEPHONE ENCOUNTER
LOV 2/7/2025  Future Appointments   Date Time Provider Department Center   2/21/2025  1:20 PM Alexis Chan DO [facility] DEP

## 2025-02-21 ENCOUNTER — TELEPHONE (OUTPATIENT)
Dept: FAMILY MEDICINE CLINIC | Age: 85
End: 2025-02-21

## 2025-02-27 ENCOUNTER — OFFICE VISIT (OUTPATIENT)
Dept: URGENT CARE | Age: 85
End: 2025-02-27

## 2025-02-27 VITALS
OXYGEN SATURATION: 95 % | HEART RATE: 60 BPM | BODY MASS INDEX: 29.44 KG/M2 | DIASTOLIC BLOOD PRESSURE: 63 MMHG | TEMPERATURE: 98 F | SYSTOLIC BLOOD PRESSURE: 115 MMHG | WEIGHT: 188 LBS

## 2025-02-27 DIAGNOSIS — S61.412A LACERATION OF LEFT HAND WITHOUT FOREIGN BODY, INITIAL ENCOUNTER: Primary | ICD-10-CM

## 2025-02-27 NOTE — PATIENT INSTRUCTIONS
Keep wound clean and dry- wash with antibacterial soap (dial soap)  Ibuprofen/tylenol (if no contraindications) prn pain   leave open to air at home- can cover if in a dirty environment  Return  anytime if increase in local pain, increase redness or red streak forming or if increase swelling or drainage noted   Recheck in 10 days to make sure laceration has healed

## 2025-02-27 NOTE — PROGRESS NOTES
/fingers pain    Tetanus is up to date (9/2019)      History provided by:  Patient   used: No        Vitals:    02/27/25 1401 02/27/25 1424   BP: 115/63    Pulse: 60    Temp: 98 °F (36.7 °C)    TempSrc: Oral    SpO2: (!) 87% 95%   Weight: 85.3 kg (188 lb)        Review of Systems   Musculoskeletal:         Left hand laceration   Skin:  Positive for wound.       Physical Exam    Physical  Vitals signs: reviewed  Constitutional:  appearance: well nourished ..  does not appear acutely ill  ..no distress   Eyes:                 Pupil: equal-round-reactive to light, no photophobia, EOMI            Cornea: clear            Sclera: clear, non injected, non icteric     Neck:    supple, non tender,                Pulmonary/Lungs:  effort normal, no stridor                                 Auscultation: good air movement / breath sounds normal  Cardio-vascular:  normal rate and rhythm                              Heart sounds normal-no murmur- no rub   Abdomen:     Non tender,    Muscular skeleton:  motor strength normal / muscle tone normal                                  Extremities/joints: no tenderness, normal movements                                  Neurological:  no focal deficit  Skin: no rash   Psychiatric:   behavior appropriate--no confusion    An electronic signature was used to authenticate this note.    --Boubacar Shah MD

## 2025-03-14 ENCOUNTER — OFFICE VISIT (OUTPATIENT)
Dept: FAMILY MEDICINE CLINIC | Age: 85
End: 2025-03-14
Payer: MEDICARE

## 2025-03-14 VITALS
SYSTOLIC BLOOD PRESSURE: 117 MMHG | WEIGHT: 183.6 LBS | RESPIRATION RATE: 16 BRPM | BODY MASS INDEX: 28.75 KG/M2 | DIASTOLIC BLOOD PRESSURE: 63 MMHG | HEART RATE: 123 BPM | TEMPERATURE: 97.1 F

## 2025-03-14 DIAGNOSIS — I10 ESSENTIAL HYPERTENSION, BENIGN: ICD-10-CM

## 2025-03-14 DIAGNOSIS — F41.9 ANXIETY: Primary | ICD-10-CM

## 2025-03-14 DIAGNOSIS — R45.89 DEPRESSED MOOD: ICD-10-CM

## 2025-03-14 PROCEDURE — G2211 COMPLEX E/M VISIT ADD ON: HCPCS | Performed by: FAMILY MEDICINE

## 2025-03-14 PROCEDURE — 1124F ACP DISCUSS-NO DSCNMKR DOCD: CPT | Performed by: FAMILY MEDICINE

## 2025-03-14 PROCEDURE — 3078F DIAST BP <80 MM HG: CPT | Performed by: FAMILY MEDICINE

## 2025-03-14 PROCEDURE — 1159F MED LIST DOCD IN RCRD: CPT | Performed by: FAMILY MEDICINE

## 2025-03-14 PROCEDURE — 99214 OFFICE O/P EST MOD 30 MIN: CPT | Performed by: FAMILY MEDICINE

## 2025-03-14 PROCEDURE — 1160F RVW MEDS BY RX/DR IN RCRD: CPT | Performed by: FAMILY MEDICINE

## 2025-03-14 PROCEDURE — 3074F SYST BP LT 130 MM HG: CPT | Performed by: FAMILY MEDICINE

## 2025-03-14 RX ORDER — ESCITALOPRAM OXALATE 20 MG/1
20 TABLET ORAL DAILY
Qty: 30 TABLET | Refills: 3 | Status: SHIPPED | OUTPATIENT
Start: 2025-03-14

## 2025-03-14 RX ORDER — BUSPIRONE HYDROCHLORIDE 5 MG/1
5 TABLET ORAL 2 TIMES DAILY
Qty: 60 TABLET | Refills: 2 | Status: SHIPPED | OUTPATIENT
Start: 2025-03-14

## 2025-03-14 ASSESSMENT — ENCOUNTER SYMPTOMS: COUGH: 1
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
## 2025-03-24 ENCOUNTER — TELEPHONE (OUTPATIENT)
Dept: FAMILY MEDICINE CLINIC | Age: 85
End: 2025-03-24

## 2025-03-24 NOTE — TELEPHONE ENCOUNTER
Pt stopped by after his visit at [facility]. They want him to have an MRI of head with and w/o contrast and told him that Dr Chan would need to order.    Attached is a copy of what he brought in. Not sure if it is an order.     Can you order?    Please advise pt.

## 2025-03-25 ENCOUNTER — TELEPHONE (OUTPATIENT)
Dept: FAMILY MEDICINE CLINIC | Age: 85
End: 2025-03-25

## 2025-03-25 NOTE — TELEPHONE ENCOUNTER
Patient stopped in for information on a referral for an MRI of head with and w/o contrast and would need Dr. Chan to order explained we will call once Doctor has reviewed message. A physical copy was put in Dr. Chan mailbox and one scanned into chart.     Can you order?       Please contact patient

## 2025-03-26 NOTE — TELEPHONE ENCOUNTER
The form that is scanned into his chart is the order, The eye DrMathieu Ordered it. Patient notified. He just needs to schedule it.

## 2025-04-01 ENCOUNTER — TELEPHONE (OUTPATIENT)
Dept: FAMILY MEDICINE CLINIC | Age: 85
End: 2025-04-01

## 2025-04-01 DIAGNOSIS — Z01.89 ENCOUNTER FOR GERIATRIC ASSESSMENT: ICD-10-CM

## 2025-04-01 DIAGNOSIS — Z63.79 STRESS DUE TO ILLNESS OF FAMILY MEMBER: Primary | ICD-10-CM

## 2025-04-01 DIAGNOSIS — F41.9 ANXIETY: ICD-10-CM

## 2025-04-01 DIAGNOSIS — R45.89 DEPRESSED MOOD: ICD-10-CM

## 2025-04-01 NOTE — TELEPHONE ENCOUNTER
Called in to inform us about a referral [facility]   Provider Rodo Richard  Fax [phone]  Address [address] [id number]

## 2025-04-15 ENCOUNTER — TELEPHONE (OUTPATIENT)
Dept: FAMILY MEDICINE CLINIC | Age: 85
End: 2025-04-15

## 2025-04-15 ENCOUNTER — OFFICE VISIT (OUTPATIENT)
Dept: FAMILY MEDICINE CLINIC | Age: 85
End: 2025-04-15
Payer: MEDICARE

## 2025-04-15 VITALS
DIASTOLIC BLOOD PRESSURE: 60 MMHG | OXYGEN SATURATION: 93 % | BODY MASS INDEX: 28.5 KG/M2 | TEMPERATURE: 97.3 F | HEART RATE: 48 BPM | SYSTOLIC BLOOD PRESSURE: 122 MMHG | RESPIRATION RATE: 16 BRPM | WEIGHT: 182 LBS

## 2025-04-15 DIAGNOSIS — R45.89 DEPRESSED MOOD: Primary | ICD-10-CM

## 2025-04-15 DIAGNOSIS — F41.9 ANXIETY: ICD-10-CM

## 2025-04-15 DIAGNOSIS — G47.00 INSOMNIA, UNSPECIFIED TYPE: ICD-10-CM

## 2025-04-15 PROCEDURE — G2211 COMPLEX E/M VISIT ADD ON: HCPCS | Performed by: FAMILY MEDICINE

## 2025-04-15 PROCEDURE — 1160F RVW MEDS BY RX/DR IN RCRD: CPT | Performed by: FAMILY MEDICINE

## 2025-04-15 PROCEDURE — 1159F MED LIST DOCD IN RCRD: CPT | Performed by: FAMILY MEDICINE

## 2025-04-15 PROCEDURE — 1124F ACP DISCUSS-NO DSCNMKR DOCD: CPT | Performed by: FAMILY MEDICINE

## 2025-04-15 PROCEDURE — 99214 OFFICE O/P EST MOD 30 MIN: CPT | Performed by: FAMILY MEDICINE

## 2025-04-15 PROCEDURE — 3074F SYST BP LT 130 MM HG: CPT | Performed by: FAMILY MEDICINE

## 2025-04-15 PROCEDURE — 3078F DIAST BP <80 MM HG: CPT | Performed by: FAMILY MEDICINE

## 2025-04-15 NOTE — PATIENT INSTRUCTIONS
Instructions:  1) When you get home today look at your medication bottles and call the office with the name of the medication that you take 2 tablets of in the morning.  2) Do not take the Lexapro 20 mg more than once a day.

## 2025-04-15 NOTE — PROGRESS NOTES
4/15/2025    This is a 84 y.o. male   Chief Complaint   Patient presents with    1 Month Follow-Up     Depression    .    HPI     Pt presents today for the following:    Anxiety/Depressed Mood: Taking Buspar 5 mg BID (started at last visit), Lexapro 20 mg daily and Trazodone 50 mg at night1 Month Follow-up.    Insomnia: Taking Trazodone 50 mg at night and states that it is helping him sleep.      Past Medical History:   Diagnosis Date    CAD (coronary artery disease)     Diabetes (HCC)     Stroke (HCC)        Office Visit on 08/21/2024   Component Date Value Ref Range Status    Hemoglobin A1C 08/21/2024 6.2  % Final       Review of Systems   Psychiatric/Behavioral:  Positive for dysphoric mood. Negative for sleep disturbance. The patient is nervous/anxious.        /60 (BP Site: Left Upper Arm, Patient Position: Sitting, BP Cuff Size: Large Adult)   Pulse (!) 48   Temp 97.3 °F (36.3 °C) (Temporal)   Resp 16   Wt 82.6 kg (182 lb)   SpO2 93%   BMI 28.50 kg/m²     Physical Exam  Vitals reviewed.   Constitutional:       Appearance: Normal appearance.   Neurological:      Mental Status: He is alert.   Psychiatric:         Mood and Affect: Mood normal.         Behavior: Behavior normal.         Thought Content: Thought content normal.         Judgment: Judgment normal.         Plan   Diagnosis Orders   1. Depressed mood  Improved, continue Buspar 5 mg BID and Lexapro 20 mg daily      2. Anxiety  Improved, continue Buspar 5 mg BID      3. Insomnia, unspecified type  Improved, continue Trazodone 50 mg at night        Pt Instructions:  1) When you get home today look at your medication bottles and call the office with the name of the medication that you take 2 tablets of in the morning.  2) Do not take the Lexapro 20 mg more than once a day    Return in about 2 months (around 6/15/2025) for Depression F/U.

## 2025-04-15 NOTE — TELEPHONE ENCOUNTER
He is to immediately stop taking Lexapro 20 mg 2 tabs daily, as 20 mg is the maximum dose. Thank you

## 2025-04-15 NOTE — TELEPHONE ENCOUNTER
Yifan called and stated he is taking Lexapro 20 mg ,2 tabs daily, our med list states he is on 1 daily.

## 2025-04-17 ENCOUNTER — TRANSCRIBE ORDERS (OUTPATIENT)
Dept: ADMINISTRATIVE | Age: 85
End: 2025-04-17

## 2025-04-17 DIAGNOSIS — H53.461 HOMONYMOUS BILATERAL FIELD DEFECTS OF RIGHT SIDE: Primary | ICD-10-CM

## 2025-04-30 ENCOUNTER — HOSPITAL ENCOUNTER (OUTPATIENT)
Dept: MRI IMAGING | Age: 85
Discharge: HOME | End: 2025-04-30
Payer: MEDICARE

## 2025-04-30 DIAGNOSIS — H53.461 HOMONYMOUS BILATERAL FIELD DEFECTS OF RIGHT SIDE: ICD-10-CM

## 2025-04-30 PROCEDURE — 70553 MRI BRAIN STEM W/O & W/DYE: CPT

## 2025-04-30 PROCEDURE — 6360000004 HC RX CONTRAST MEDICATION

## 2025-04-30 PROCEDURE — A9579 GAD-BASE MR CONTRAST NOS,1ML: HCPCS

## 2025-04-30 RX ADMIN — GADOTERIDOL 17 ML: 279.3 INJECTION, SOLUTION INTRAVENOUS at 15:57

## 2025-05-01 ENCOUNTER — TELEPHONE (OUTPATIENT)
Dept: FAMILY MEDICINE CLINIC | Age: 85
End: 2025-05-01
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
## 2025-05-09 DIAGNOSIS — I10 ESSENTIAL HYPERTENSION, BENIGN: ICD-10-CM

## 2025-05-09 RX ORDER — LOSARTAN POTASSIUM AND HYDROCHLOROTHIAZIDE 25; 100 MG/1; MG/1
1 TABLET ORAL DAILY
Qty: 90 TABLET | Refills: 1 | Status: SHIPPED | OUTPATIENT
Start: 2025-05-09

## 2025-05-09 NOTE — TELEPHONE ENCOUNTER
LOV 4/15/2025  Future Appointments   Date Time Provider Department Center   6/17/2025  2:00 PM Alexis Chan DO [facility] DEP

## 2025-05-27 RX ORDER — CARVEDILOL 6.25 MG/1
6.25 TABLET ORAL 2 TIMES DAILY WITH MEALS
Qty: 180 TABLET | Refills: 0 | Status: SHIPPED | OUTPATIENT
Start: 2025-05-27

## 2025-05-27 NOTE — TELEPHONE ENCOUNTER
Please address in the absence of provider.   LOV 4/15/2025  Future Appointments   Date Time Provider Department Center   6/17/2025  2:00 PM Alexis Chan, DO [facility] DEP

## 2025-06-04 DIAGNOSIS — E78.00 PURE HYPERCHOLESTEROLEMIA: ICD-10-CM

## 2025-06-04 RX ORDER — ATORVASTATIN CALCIUM 80 MG/1
80 TABLET, FILM COATED ORAL DAILY
Qty: 90 TABLET | Refills: 1 | Status: SHIPPED | OUTPATIENT
Start: 2025-06-04

## 2025-06-04 NOTE — TELEPHONE ENCOUNTER
LOV 4/15/2025  Future Appointments   Date Time Provider Department Center   6/17/2025  2:00 PM Alexis Chan DO [facility] DEP

## 2025-06-17 ENCOUNTER — OFFICE VISIT (OUTPATIENT)
Dept: FAMILY MEDICINE CLINIC | Age: 85
End: 2025-06-17
Payer: MEDICARE

## 2025-06-17 VITALS
WEIGHT: 186.8 LBS | TEMPERATURE: 97.5 F | RESPIRATION RATE: 16 BRPM | DIASTOLIC BLOOD PRESSURE: 63 MMHG | HEART RATE: 56 BPM | SYSTOLIC BLOOD PRESSURE: 120 MMHG | BODY MASS INDEX: 29.25 KG/M2 | OXYGEN SATURATION: 93 %

## 2025-06-17 DIAGNOSIS — F41.8 ANXIETY WITH DEPRESSION: ICD-10-CM

## 2025-06-17 DIAGNOSIS — I10 ESSENTIAL HYPERTENSION, BENIGN: Primary | ICD-10-CM

## 2025-06-17 PROCEDURE — 3078F DIAST BP <80 MM HG: CPT | Performed by: FAMILY MEDICINE

## 2025-06-17 PROCEDURE — 3074F SYST BP LT 130 MM HG: CPT | Performed by: FAMILY MEDICINE

## 2025-06-17 PROCEDURE — G2211 COMPLEX E/M VISIT ADD ON: HCPCS | Performed by: FAMILY MEDICINE

## 2025-06-17 PROCEDURE — 99214 OFFICE O/P EST MOD 30 MIN: CPT | Performed by: FAMILY MEDICINE

## 2025-06-17 PROCEDURE — 1159F MED LIST DOCD IN RCRD: CPT | Performed by: FAMILY MEDICINE

## 2025-06-17 PROCEDURE — 1124F ACP DISCUSS-NO DSCNMKR DOCD: CPT | Performed by: FAMILY MEDICINE

## 2025-06-17 NOTE — PROGRESS NOTES
Mood and Affect: Mood normal.         Behavior: Behavior normal.         Thought Content: Thought content normal.         Judgment: Judgment normal.         Plan   Diagnosis Orders   1. Essential hypertension, benign  Stable, continue Coreg 6.25 mg BID and Losartan-HCTZ 100-25 mg daily      2. Anxiety with depression  Stable, continue Buspar 5 mg BID, Lexapro 20 mg daily, and Trazodone 50 mg at night          Return in about 3 months (around 9/17/2025) for Medication Check.

## 2025-07-01 DIAGNOSIS — F41.9 ANXIETY: ICD-10-CM

## 2025-07-01 RX ORDER — BUSPIRONE HYDROCHLORIDE 5 MG/1
5 TABLET ORAL 2 TIMES DAILY
Qty: 60 TABLET | Refills: 2 | Status: SHIPPED | OUTPATIENT
Start: 2025-07-01

## 2025-07-01 NOTE — TELEPHONE ENCOUNTER
Future Appointments   Date Time Provider Department Center   9/19/2025  2:00 PM Alexis Chan DO [facility] DEP     LOV 6/17/2025

## 2025-07-31 DIAGNOSIS — E03.9 HYPOTHYROIDISM, UNSPECIFIED TYPE: ICD-10-CM

## 2025-07-31 RX ORDER — LEVOTHYROXINE SODIUM 75 UG/1
75 TABLET ORAL DAILY
Qty: 90 TABLET | Refills: 1 | Status: SHIPPED | OUTPATIENT
Start: 2025-07-31

## 2025-07-31 NOTE — TELEPHONE ENCOUNTER
LOV 6/17/2025  Future Appointments   Date Time Provider Department Center   9/19/2025  2:00 PM Alexis Chan DO [facility] DEP

## 2025-08-26 RX ORDER — CARVEDILOL 6.25 MG/1
TABLET ORAL
Qty: 180 TABLET | Refills: 1 | Status: SHIPPED | OUTPATIENT
Start: 2025-08-26